# Patient Record
Sex: FEMALE | Race: WHITE | NOT HISPANIC OR LATINO | Employment: FULL TIME | URBAN - METROPOLITAN AREA
[De-identification: names, ages, dates, MRNs, and addresses within clinical notes are randomized per-mention and may not be internally consistent; named-entity substitution may affect disease eponyms.]

---

## 2017-01-17 ENCOUNTER — OFFICE VISIT (OUTPATIENT)
Dept: FAMILY MEDICINE CLINIC | Facility: CLINIC | Age: 52
End: 2017-01-17

## 2017-01-17 VITALS
SYSTOLIC BLOOD PRESSURE: 110 MMHG | DIASTOLIC BLOOD PRESSURE: 80 MMHG | HEART RATE: 103 BPM | WEIGHT: 140.5 LBS | TEMPERATURE: 97.8 F | OXYGEN SATURATION: 96 % | HEIGHT: 59 IN | BODY MASS INDEX: 28.32 KG/M2

## 2017-01-17 DIAGNOSIS — F41.9 ANXIETY: ICD-10-CM

## 2017-01-17 DIAGNOSIS — N95.1 PERIMENOPAUSAL SYMPTOMS: ICD-10-CM

## 2017-01-17 PROCEDURE — 99214 OFFICE O/P EST MOD 30 MIN: CPT | Performed by: NURSE PRACTITIONER

## 2017-01-17 RX ORDER — OXYBUTYNIN CHLORIDE 10 MG/1
10 TABLET, EXTENDED RELEASE ORAL DAILY
Qty: 30 TABLET | Refills: 0 | Status: SHIPPED | OUTPATIENT
Start: 2017-01-17 | End: 2019-02-25

## 2017-01-17 RX ORDER — TRAMADOL HYDROCHLORIDE 50 MG/1
50 TABLET ORAL EVERY 8 HOURS PRN
Qty: 90 TABLET | Refills: 0 | Status: SHIPPED | OUTPATIENT
Start: 2017-01-17 | End: 2018-03-08

## 2017-01-17 RX ORDER — AMITRIPTYLINE HYDROCHLORIDE 50 MG/1
50 TABLET, FILM COATED ORAL NIGHTLY
Qty: 90 TABLET | Refills: 1 | Status: SHIPPED | OUTPATIENT
Start: 2017-01-17 | End: 2017-05-16 | Stop reason: SINTOL

## 2017-01-17 NOTE — MR AVS SNAPSHOT
Mason Parada   1/17/2017 9:45 AM   Office Visit    Provider:  LANE Ho   Department:  [facility] FAM[facility]IC[facility]   Dept Phone:  [phone]                Your Full Care Plan              Today's Medication Changes          These changes are accurate as of: 1/17/17 10:40 AM.  If you have any questions, ask your nurse or doctor.               New Medication(s)Ordered:     oxybutynin XL 10 MG 24 hr tablet   Commonly known as:  DITROPAN-XL   Take 1 tablet by mouth Daily.   Started by:  LANE Ho       traMADol 50 MG tablet   Commonly known as:  ULTRAM   Take 1 tablet by mouth Every 8 (Eight) Hours As Needed for moderate pain (4-6).   Started by:  LANE Ho         Medication(s)that have changed:     amitriptyline 50 MG tablet   Commonly known as:  ELAVIL   Take 1 tablet by mouth Every Night.   What changed:    - medication strength  - how much to take   Changed by:  LANE Ho         Stop taking medication(s)listed here:     oxyCODONE-acetaminophen 5-325 MG per tablet   Commonly known as:  PERCOCET   Stopped by:  LANE Ho           tamsulosin 0.4 MG capsule 24 hr capsule   Commonly known as:  FLOMAX   Stopped by:  LANE Ho                Where to Get Your Medications      These medications were sent to [address] [address] [facility] AT [facility] & [place] - [phone] [facility] [phone] [address] [address]     Phone:  [phone]     amitriptyline 50 MG tablet    oxybutynin XL 10 MG 24 hr tablet         You can get these medications from any pharmacy     Bring a paper prescription for each of these medications     traMADol 50 MG tablet                  Your Updated Medication List          This list is accurate as of: 1/17/17 10:40 AM.  Always use your most recent med list.                amitriptyline 50 MG tablet   Commonly known as:  ELAVIL   Take 1 tablet by mouth Every Night.       fexofenadine 180 MG tablet   Commonly known as:  ALLEGRA       lisinopril 40 MG tablet   Commonly known as:  PRINIVIL,ZESTRIL   Take 1 tablet by mouth daily.       lisinopril-hydrochlorothiazide 20-25 MG per tablet   Commonly known as:  PRINZIDE,ZESTORETIC   Take 1 tablet by mouth Daily.       MOTRIN PO       ondansetron 4 MG tablet   Commonly known as:  ZOFRAN   Take 1 tablet by mouth every 8 (eight) hours as needed for nausea or vomiting.       oxybutynin XL 10 MG 24 hr tablet   Commonly known as:  DITROPAN-XL   Take 1 tablet by mouth Daily.       traMADol 50 MG tablet   Commonly known as:  ULTRAM   Take 1 tablet by mouth Every 8 (Eight) Hours As Needed for moderate pain (4-6).               You Were Diagnosed With        Codes Comments    Anxiety     ICD-10-CM: F41.9  ICD-9-CM: 300.00     Perimenopausal symptoms     ICD-10-CM: N95.1  ICD-9-CM: 627.2       Instructions     None    Patient Instructions History      MyChart Signup     [facility] [employer] allows you to send messages to your doctor, view your test results, renew your prescriptions, schedule appointments, and more. To sign up, go to [employer] and click on the Sign Up Now link in the New User? box. Enter your [employer] Activation Code exactly as it appears below along with the last four digits of your Social Security Number and your Date of Birth () to complete the sign-up process. If you do not sign up before the expiration date, you must request a new code.    [employer] Activation Code: [email or web address]  Expires: 2017 10:40 AM    If you have questions, you can email [email or web address] or call [phone] to talk to our [employer] staff. Remember, [employer] is NOT to be used for urgent needs. For medical emergencies, dial 911.               Other Info from Your Visit           Allergies     Sulfa Antibiotics  Itching     "  Reason for Visit     Flank Pain sees urology( had a stent removed yesterday)    Depression f/u      Vital Signs     Blood Pressure Pulse Temperature Height Weight Oxygen Saturation    110/80 103 97.8 °F (36.6 °C) 59\" (149.9 cm) 140 lb 8 oz (63.7 kg) 96%    Body Mass Index Smoking Status                28.38 kg/m2 Never Smoker          Problems and Diagnoses Noted     Anxiety problem        Perimenopausal symptoms          "

## 2017-01-17 NOTE — PROGRESS NOTES
Subjective   Mason Parada is a 51 y.o. female.     History of Present Illness   Mason Parada female 51 y.o. who presents today for follow up of Anxiety.  She reports anxiety and sleep disturbance.   She denies current suicidal and homicidal ideation. Risk factors are lifestyle of multiple roles, job stress and prior diagnosis of anxiety.  Previous treatment includes current Rx.  She complains of the following medication side effects: none.  Patient reports the medication is helping some but would like to try increasing the dose. Patient had stint removed yesterday at urology office which was painful.  She has contacted that office to request medication but is in significant discomfort which is making her more anxious.      The following portions of the patient's history were reviewed and updated as appropriate: allergies, current medications, past family history, past medical history, past social history, past surgical history and problem list.    Review of Systems   Psychiatric/Behavioral: Positive for agitation and sleep disturbance. The patient is nervous/anxious.        Objective   Physical Exam   Constitutional: She is oriented to person, place, and time. She appears well-developed and well-nourished.   HENT:   Head: Normocephalic and atraumatic.   Pulmonary/Chest: Effort normal.   Neurological: She is alert and oriented to person, place, and time.   Skin: Skin is warm and dry.   Psychiatric: Her mood appears anxious. Her speech is rapid and/or pressured. She is agitated.   Vitals reviewed.      Assessment/Plan   Mason was seen today for flank pain and depression.    Diagnoses and all orders for this visit:    Anxiety  -     amitriptyline (ELAVIL) 50 MG tablet; Take 1 tablet by mouth Every Night.    Perimenopausal symptoms  -     amitriptyline (ELAVIL) 50 MG tablet; Take 1 tablet by mouth Every Night.    Other orders  -     oxybutynin XL (DITROPAN-XL) 10 MG 24 hr tablet; Take 1 tablet by mouth  Daily.        Patient given tramadol RX signed per Dr. Rousseau. IRMA reviewed and controlled substance documents signed.

## 2017-02-11 DIAGNOSIS — F41.9 ANXIETY: ICD-10-CM

## 2017-02-11 DIAGNOSIS — I10 ESSENTIAL HYPERTENSION: ICD-10-CM

## 2017-02-11 DIAGNOSIS — N95.1 PERIMENOPAUSAL SYMPTOMS: ICD-10-CM

## 2017-02-13 RX ORDER — AMITRIPTYLINE HYDROCHLORIDE 25 MG/1
TABLET, FILM COATED ORAL
Qty: 30 TABLET | Refills: 0 | OUTPATIENT
Start: 2017-02-13

## 2017-02-13 RX ORDER — LISINOPRIL AND HYDROCHLOROTHIAZIDE 25; 20 MG/1; MG/1
TABLET ORAL
Qty: 30 TABLET | Refills: 0 | Status: SHIPPED | OUTPATIENT
Start: 2017-02-13 | End: 2017-03-16 | Stop reason: SDUPTHER

## 2017-02-18 DIAGNOSIS — I10 ESSENTIAL HYPERTENSION: ICD-10-CM

## 2017-02-20 RX ORDER — LISINOPRIL AND HYDROCHLOROTHIAZIDE 25; 20 MG/1; MG/1
TABLET ORAL
Qty: 30 TABLET | Refills: 0 | OUTPATIENT
Start: 2017-02-20

## 2017-03-16 ENCOUNTER — OFFICE VISIT (OUTPATIENT)
Dept: FAMILY MEDICINE CLINIC | Facility: CLINIC | Age: 52
End: 2017-03-16

## 2017-03-16 VITALS
HEART RATE: 95 BPM | TEMPERATURE: 98.6 F | DIASTOLIC BLOOD PRESSURE: 84 MMHG | HEIGHT: 59 IN | SYSTOLIC BLOOD PRESSURE: 126 MMHG | WEIGHT: 148 LBS | RESPIRATION RATE: 16 BRPM | BODY MASS INDEX: 29.84 KG/M2 | OXYGEN SATURATION: 96 %

## 2017-03-16 DIAGNOSIS — I10 ESSENTIAL HYPERTENSION: ICD-10-CM

## 2017-03-16 PROCEDURE — 99213 OFFICE O/P EST LOW 20 MIN: CPT | Performed by: NURSE PRACTITIONER

## 2017-03-16 RX ORDER — LISINOPRIL AND HYDROCHLOROTHIAZIDE 25; 20 MG/1; MG/1
TABLET ORAL
Qty: 30 TABLET | Refills: 0 | OUTPATIENT
Start: 2017-03-16

## 2017-03-16 RX ORDER — LISINOPRIL AND HYDROCHLOROTHIAZIDE 25; 20 MG/1; MG/1
1 TABLET ORAL DAILY
Qty: 90 TABLET | Refills: 1 | Status: SHIPPED | OUTPATIENT
Start: 2017-03-16 | End: 2017-10-31 | Stop reason: SDUPTHER

## 2017-03-16 NOTE — PROGRESS NOTES
Subjective   Mason Parada is a 51 y.o. female.     History of Present Illness   Mason Parada 51 y.o. female who presents today for routine follow up check and medication refills.  she has a history of   Patient Active Problem List   Diagnosis   • Essential hypertension   • Gastroesophageal reflux disease without esophagitis   • Seasonal allergies   .  Since the last visit, she has overall felt well.  she has been compliant with current meds.  she denies medication side effects.   Reports Elavil has helped her anxiety and she would like to continue on current dose.  She denies side effects.  She needs refill on her lisinopril/hctz.  BP today is 126/84.    The following portions of the patient's history were reviewed and updated as appropriate: allergies, current medications, past family history, past medical history, past social history, past surgical history and problem list.    Review of Systems   Constitutional: Negative for unexpected weight change.   Respiratory: Negative for shortness of breath.    Cardiovascular: Negative for chest pain and palpitations.   Psychiatric/Behavioral: Negative for behavioral problems.       Objective   Physical Exam   Constitutional: She is oriented to person, place, and time. She appears well-developed and well-nourished.   Cardiovascular: Normal rate and regular rhythm.    Pulmonary/Chest: Effort normal and breath sounds normal.   Neurological: She is alert and oriented to person, place, and time.   Psychiatric: She has a normal mood and affect. Judgment normal.   Vitals reviewed.      Assessment/Plan   Mason was seen today for depression and hypertension.    Diagnoses and all orders for this visit:    Essential hypertension  -     lisinopril-hydrochlorothiazide (PRINZIDE,ZESTORETIC) 20-25 MG per tablet; Take 1 tablet by mouth Daily.

## 2017-05-16 ENCOUNTER — OFFICE VISIT (OUTPATIENT)
Dept: FAMILY MEDICINE CLINIC | Facility: CLINIC | Age: 52
End: 2017-05-16

## 2017-05-16 VITALS
BODY MASS INDEX: 29.23 KG/M2 | OXYGEN SATURATION: 99 % | TEMPERATURE: 98.5 F | HEIGHT: 59 IN | SYSTOLIC BLOOD PRESSURE: 120 MMHG | HEART RATE: 77 BPM | WEIGHT: 145 LBS | DIASTOLIC BLOOD PRESSURE: 76 MMHG | RESPIRATION RATE: 16 BRPM

## 2017-05-16 DIAGNOSIS — F41.9 ANXIETY: Primary | ICD-10-CM

## 2017-05-16 PROCEDURE — 99214 OFFICE O/P EST MOD 30 MIN: CPT | Performed by: NURSE PRACTITIONER

## 2017-05-16 RX ORDER — DULOXETIN HYDROCHLORIDE 30 MG/1
30 CAPSULE, DELAYED RELEASE ORAL DAILY
Qty: 30 CAPSULE | Refills: 1 | Status: SHIPPED | OUTPATIENT
Start: 2017-05-16 | End: 2017-09-14

## 2017-06-16 ENCOUNTER — OFFICE VISIT (OUTPATIENT)
Dept: FAMILY MEDICINE CLINIC | Facility: CLINIC | Age: 52
End: 2017-06-16

## 2017-06-16 VITALS
RESPIRATION RATE: 18 BRPM | OXYGEN SATURATION: 97 % | SYSTOLIC BLOOD PRESSURE: 122 MMHG | BODY MASS INDEX: 28.83 KG/M2 | HEART RATE: 75 BPM | TEMPERATURE: 98.1 F | DIASTOLIC BLOOD PRESSURE: 72 MMHG | WEIGHT: 143 LBS | HEIGHT: 59 IN

## 2017-06-16 DIAGNOSIS — F41.9 ANXIETY: Primary | ICD-10-CM

## 2017-06-16 PROCEDURE — 99213 OFFICE O/P EST LOW 20 MIN: CPT | Performed by: NURSE PRACTITIONER

## 2017-06-16 NOTE — PROGRESS NOTES
Subjective   Mason Parada is a 51 y.o. female.     History of Present Illness   Mason Parada female 51 y.o. who presents today for follow up of Anxiety.  She reports medication does not seem to be helping.   She denies current suicidal and homicidal ideation. She would like to have [employer] testing done.   The following portions of the patient's history were reviewed and updated as appropriate: allergies, current medications, past family history, past medical history, past social history, past surgical history and problem list.    Review of Systems   Constitutional: Negative for unexpected weight change.   Respiratory: Negative for shortness of breath.    Cardiovascular: Negative for chest pain and palpitations.   Psychiatric/Behavioral: Positive for sleep disturbance. Negative for behavioral problems. The patient is nervous/anxious.        Objective   Physical Exam   Constitutional: She is oriented to person, place, and time. She appears well-developed and well-nourished.   Cardiovascular: Normal rate and regular rhythm.    Pulmonary/Chest: Effort normal and breath sounds normal.   Neurological: She is alert and oriented to person, place, and time.   Psychiatric: She has a normal mood and affect. Judgment normal.   Nursing note and vitals reviewed.      Assessment/Plan   Mason was seen today for medication problem.    Diagnoses and all orders for this visit:    Anxiety           [employer] testing ordered.  No medication changes until results received and reviewed with patient.

## 2017-06-23 RX ORDER — DESVENLAFAXINE SUCCINATE 50 MG/1
50 TABLET, EXTENDED RELEASE ORAL DAILY
Qty: 90 TABLET | Refills: 0 | Status: SHIPPED | OUTPATIENT
Start: 2017-06-23 | End: 2017-09-14 | Stop reason: SINTOL

## 2017-09-14 ENCOUNTER — OFFICE VISIT (OUTPATIENT)
Dept: FAMILY MEDICINE CLINIC | Facility: CLINIC | Age: 52
End: 2017-09-14

## 2017-09-14 VITALS
TEMPERATURE: 97.6 F | HEIGHT: 59 IN | SYSTOLIC BLOOD PRESSURE: 137 MMHG | WEIGHT: 148 LBS | BODY MASS INDEX: 29.84 KG/M2 | HEART RATE: 87 BPM | DIASTOLIC BLOOD PRESSURE: 79 MMHG | OXYGEN SATURATION: 98 %

## 2017-09-14 DIAGNOSIS — F41.9 ANXIETY: Primary | ICD-10-CM

## 2017-09-14 PROCEDURE — 99213 OFFICE O/P EST LOW 20 MIN: CPT | Performed by: NURSE PRACTITIONER

## 2017-09-14 RX ORDER — BUSPIRONE HYDROCHLORIDE 5 MG/1
5 TABLET ORAL 3 TIMES DAILY
Qty: 60 TABLET | Refills: 0 | Status: SHIPPED | OUTPATIENT
Start: 2017-09-14 | End: 2017-10-03 | Stop reason: SDUPTHER

## 2017-09-14 RX ORDER — BUPROPION HYDROCHLORIDE 150 MG/1
150 TABLET ORAL DAILY
Qty: 30 TABLET | Refills: 1 | Status: CANCELLED | OUTPATIENT
Start: 2017-09-14

## 2017-09-14 RX ORDER — VILAZODONE HYDROCHLORIDE 20 MG/1
20 TABLET ORAL DAILY
Qty: 30 TABLET | Refills: 0 | Status: SHIPPED | OUTPATIENT
Start: 2017-09-14 | End: 2017-11-28 | Stop reason: SINTOL

## 2017-09-14 NOTE — PROGRESS NOTES
Subjective   Mason Parada is a 52 y.o. female.     History of Present Illness   Mason Parada female 52 y.o. who presents today for follow up of Anxiety and Panic Attacks.  She reports anxiety, excessive worry, unable to relax, panic feeling and weight gain.   She denies current suicidal and homicidal ideation. Risk factors are prior diagnosis of anxiety.  Previous treatment includes several medications.  She complains of the following medication side effects: weight gain.     Based on [employer] testing, only recommended option would be pristiq.  Patient tried but did not notice improvement.  She has also tried trintellix, elavil, cymbalta with no improvement.     The following portions of the patient's history were reviewed and updated as appropriate: allergies, current medications, past family history, past medical history, past social history, past surgical history and problem list.    Review of Systems   Constitutional: Negative for unexpected weight change.   Respiratory: Negative for shortness of breath.    Cardiovascular: Negative for chest pain and palpitations.   Psychiatric/Behavioral: Positive for decreased concentration. Negative for behavioral problems, dysphoric mood, self-injury and suicidal ideas. The patient is nervous/anxious and is hyperactive.        Objective   Physical Exam   Constitutional: She is oriented to person, place, and time. She appears well-developed and well-nourished.   Cardiovascular: Regular rhythm.    Pulmonary/Chest: Effort normal.   Neurological: She is alert and oriented to person, place, and time.   Psychiatric: Her speech is normal and behavior is normal. Judgment and thought content normal. Her mood appears anxious. Cognition and memory are normal.   Nursing note and vitals reviewed.      Assessment/Plan   Mason was seen today for anxiety.    Diagnoses and all orders for this visit:    Anxiety  -     busPIRone (BUSPAR) 5 MG tablet; Take 1 tablet by mouth 3 (Three) Times a  Day.  -     vilazodone (VIIBRYD) 20 MG tablet tablet; Take 1 tablet by mouth Daily.    Other orders  -     Cancel: buPROPion XL (WELLBUTRIN XL) 150 MG 24 hr tablet; Take 1 tablet by mouth Daily.

## 2017-09-30 DIAGNOSIS — F41.9 ANXIETY: ICD-10-CM

## 2017-10-02 RX ORDER — BUSPIRONE HYDROCHLORIDE 5 MG/1
TABLET ORAL
Qty: 60 TABLET | Refills: 0 | OUTPATIENT
Start: 2017-10-02

## 2017-10-03 RX ORDER — BUSPIRONE HYDROCHLORIDE 5 MG/1
5 TABLET ORAL 3 TIMES DAILY
Qty: 60 TABLET | Refills: 2 | Status: SHIPPED | OUTPATIENT
Start: 2017-10-03 | End: 2017-11-30 | Stop reason: SDUPTHER

## 2017-10-31 DIAGNOSIS — I10 ESSENTIAL HYPERTENSION: ICD-10-CM

## 2017-10-31 RX ORDER — LISINOPRIL AND HYDROCHLOROTHIAZIDE 25; 20 MG/1; MG/1
TABLET ORAL
Qty: 30 TABLET | Refills: 0 | Status: SHIPPED | OUTPATIENT
Start: 2017-10-31 | End: 2017-12-13 | Stop reason: SDUPTHER

## 2017-11-10 DIAGNOSIS — I10 ESSENTIAL HYPERTENSION: ICD-10-CM

## 2017-11-10 RX ORDER — LISINOPRIL AND HYDROCHLOROTHIAZIDE 25; 20 MG/1; MG/1
TABLET ORAL
Qty: 30 TABLET | Refills: 0 | Status: SHIPPED | OUTPATIENT
Start: 2017-11-10 | End: 2017-12-13

## 2017-11-28 ENCOUNTER — OFFICE VISIT (OUTPATIENT)
Dept: FAMILY MEDICINE CLINIC | Facility: CLINIC | Age: 52
End: 2017-11-28

## 2017-11-28 VITALS
HEART RATE: 81 BPM | SYSTOLIC BLOOD PRESSURE: 114 MMHG | TEMPERATURE: 98.3 F | RESPIRATION RATE: 16 BRPM | DIASTOLIC BLOOD PRESSURE: 74 MMHG | HEIGHT: 59 IN | WEIGHT: 152 LBS | BODY MASS INDEX: 30.64 KG/M2

## 2017-11-28 DIAGNOSIS — J01.00 ACUTE MAXILLARY SINUSITIS, RECURRENCE NOT SPECIFIED: Primary | ICD-10-CM

## 2017-11-28 DIAGNOSIS — F41.9 ANXIETY: ICD-10-CM

## 2017-11-28 PROCEDURE — 99214 OFFICE O/P EST MOD 30 MIN: CPT | Performed by: NURSE PRACTITIONER

## 2017-11-28 RX ORDER — BENZONATATE 200 MG/1
200 CAPSULE ORAL 3 TIMES DAILY PRN
Qty: 30 CAPSULE | Refills: 0 | Status: SHIPPED | OUTPATIENT
Start: 2017-11-28 | End: 2018-03-08

## 2017-11-28 RX ORDER — AMOXICILLIN AND CLAVULANATE POTASSIUM 875; 125 MG/1; MG/1
1 TABLET, FILM COATED ORAL 2 TIMES DAILY
Qty: 20 TABLET | Refills: 0 | Status: SHIPPED | OUTPATIENT
Start: 2017-11-28 | End: 2017-12-08

## 2017-11-28 NOTE — PROGRESS NOTES
Subjective   Mason Parada is a 52 y.o. female.     History of Present Illness   Mason Parada 52 y.o. female who presents for evaluation of sinus pressure and congestion. Symptoms include ear pressure, congestion, nasal blockage, dry cough, productive cough and lower, upper sinus pain.  Onset of symptoms was 4 weeks ago, unchanged since that time. Patient denies shortness of breath, wheezing.   Evaluation to date: none Treatment to date:  OTC oral decongestants and OTC cough suppressant.       Patient stopped viibryd due to diarrhea.  She has been taking buspar TID which seems to help.   The following portions of the patient's history were reviewed and updated as appropriate: allergies, current medications, past family history, past medical history, past social history, past surgical history and problem list.    Review of Systems   Constitutional: Negative for chills and fever.   HENT: Positive for congestion, postnasal drip, rhinorrhea and sinus pressure.    Respiratory: Positive for cough and chest tightness.        Objective   Physical Exam   Constitutional: She is oriented to person, place, and time. She appears well-developed and well-nourished.   HENT:   Right Ear: Tympanic membrane, external ear and ear canal normal.   Left Ear: Tympanic membrane, external ear and ear canal normal.   Nose: Right sinus exhibits maxillary sinus tenderness. Right sinus exhibits no frontal sinus tenderness. Left sinus exhibits maxillary sinus tenderness. Left sinus exhibits no frontal sinus tenderness.   Mouth/Throat: Uvula is midline and oropharynx is clear and moist.   Cardiovascular: Normal rate and regular rhythm.    Pulmonary/Chest: Effort normal and breath sounds normal.   Neurological: She is alert and oriented to person, place, and time.   Skin: Skin is warm.   Psychiatric: She has a normal mood and affect. Judgment normal.   Nursing note and vitals reviewed.      Assessment/Plan   Mason was seen today for  uri.    Diagnoses and all orders for this visit:    Acute maxillary sinusitis, recurrence not specified  -     amoxicillin-clavulanate (AUGMENTIN) 875-125 MG per tablet; Take 1 tablet by mouth 2 (Two) Times a Day for 10 days.  -     benzonatate (TESSALON) 200 MG capsule; Take 1 capsule by mouth 3 (Three) Times a Day As Needed for Cough.    Anxiety

## 2017-11-30 DIAGNOSIS — F41.9 ANXIETY: ICD-10-CM

## 2017-11-30 RX ORDER — BUSPIRONE HYDROCHLORIDE 5 MG/1
TABLET ORAL
Qty: 60 TABLET | Refills: 1 | OUTPATIENT
Start: 2017-11-30

## 2017-11-30 RX ORDER — BUSPIRONE HYDROCHLORIDE 5 MG/1
5 TABLET ORAL 3 TIMES DAILY
Qty: 60 TABLET | Refills: 2 | Status: SHIPPED | OUTPATIENT
Start: 2017-11-30 | End: 2017-12-13 | Stop reason: SDUPTHER

## 2017-12-13 ENCOUNTER — OFFICE VISIT (OUTPATIENT)
Dept: FAMILY MEDICINE CLINIC | Facility: CLINIC | Age: 52
End: 2017-12-13

## 2017-12-13 VITALS
DIASTOLIC BLOOD PRESSURE: 92 MMHG | RESPIRATION RATE: 18 BRPM | SYSTOLIC BLOOD PRESSURE: 138 MMHG | HEIGHT: 59 IN | TEMPERATURE: 97.9 F | BODY MASS INDEX: 31.25 KG/M2 | OXYGEN SATURATION: 98 % | WEIGHT: 155 LBS | HEART RATE: 82 BPM

## 2017-12-13 DIAGNOSIS — G56.01 CARPAL TUNNEL SYNDROME OF RIGHT WRIST: ICD-10-CM

## 2017-12-13 DIAGNOSIS — I10 ESSENTIAL HYPERTENSION: Primary | ICD-10-CM

## 2017-12-13 DIAGNOSIS — F41.9 ANXIETY: ICD-10-CM

## 2017-12-13 PROCEDURE — 99214 OFFICE O/P EST MOD 30 MIN: CPT | Performed by: NURSE PRACTITIONER

## 2017-12-13 RX ORDER — BUSPIRONE HYDROCHLORIDE 5 MG/1
5 TABLET ORAL 3 TIMES DAILY
Qty: 60 TABLET | Refills: 11 | Status: SHIPPED | OUTPATIENT
Start: 2017-12-13 | End: 2018-11-30

## 2017-12-13 RX ORDER — LISINOPRIL AND HYDROCHLOROTHIAZIDE 25; 20 MG/1; MG/1
1 TABLET ORAL DAILY
Qty: 30 TABLET | Refills: 11 | Status: SHIPPED | OUTPATIENT
Start: 2017-12-13 | End: 2018-01-18 | Stop reason: SDUPTHER

## 2017-12-13 RX ORDER — DICLOFENAC 35 MG/1
35 CAPSULE ORAL 3 TIMES DAILY PRN
Qty: 90 CAPSULE | Refills: 1 | Status: SHIPPED | OUTPATIENT
Start: 2017-12-13 | End: 2018-02-13 | Stop reason: SDUPTHER

## 2017-12-13 NOTE — PROGRESS NOTES
Subjective   Mason Parada is a 52 y.o. female.     History of Present Illness   Mason Parada 52 y.o. female who presents today for routine follow up check and medication refills.  she has a history of   Patient Active Problem List   Diagnosis   • Essential hypertension   • Gastroesophageal reflux disease without esophagitis   • Seasonal allergies   .  Since the last visit, she has overall felt well.  She has been taking medications as prescribed.  she has been compliant with current medications have reviewed them.  The patient denies medication side effects.    Results for orders placed or performed during the hospital encounter of 05/24/16   Once Urine culture   Result Value Ref Range    Urine Culture >100,000 CFU/mL Mixed Culture    Once Comprehensive metabolic panel   Result Value Ref Range    Glucose 89 65 - 99 mg/dL    BUN 19 6 - 20 mg/dL    Creatinine 0.68 0.57 - 1.00 mg/dL    Sodium 134 (L) 136 - 145 mmol/L    Potassium 4.0 3.5 - 5.2 mmol/L    Chloride 98 98 - 107 mmol/L    CO2 24.8 22.0 - 29.0 mmol/L    Calcium 9.3 8.6 - 10.5 mg/dL    Total Protein 6.5 6.0 - 8.5 g/dL    Albumin 3.90 3.50 - 5.20 g/dL    ALT (SGPT) 27 1 - 33 U/L    AST (SGOT) 23 1 - 32 U/L    Alkaline Phosphatase 74 39 - 117 U/L    Total Bilirubin 0.5 0.1 - 1.2 mg/dL    eGFR Non African Amer 92 >60 mL/min/1.73    Globulin 2.6 gm/dL    A/G Ratio 1.5 g/dL    BUN/Creatinine Ratio 27.9 (H) 7.0 - 25.0    Anion Gap 11.2 mmol/L   Once Lipase   Result Value Ref Range    Lipase 29 13 - 60 U/L   Once Urinalysis w/Culture if Indicated   Result Value Ref Range    Color, UA Yellow Yellow, Straw    Appearance, UA Clear Clear    pH, UA 6.0 5.0 - 8.0    Specific Gravity, UA 1.023 1.005 - 1.030    Glucose, UA Negative Negative    Ketones, UA Negative Negative    Bilirubin, UA Negative Negative    Blood, UA Small (1+) (A) Negative    Protein, UA Negative Negative    Leuk Esterase, UA Small (1+) (A) Negative    Nitrite, UA Negative Negative    Urobilinogen, UA  0.2 E.U./dL 0.2 E.U./dL, 1.0 E.U./dL   Once hCG, serum, qualitative   Result Value Ref Range    HCG Qualitative Negative Indeterminate, Negative   Once CBC auto differential   Result Value Ref Range    WBC 13.50 (H) 4.50 - 10.70 10*3/mm3    RBC 4.45 3.90 - 5.20 10*6/mm3    Hemoglobin 13.1 11.9 - 15.5 g/dL    Hematocrit 39.0 35.6 - 45.5 %    MCV 87.6 80.5 - 98.2 fL    MCH 29.4 26.9 - 32.7 pg    MCHC 33.6 32.4 - 36.3 g/dL    RDW 12.0 11.7 - 13.0 %    RDW-SD 38.7 37.0 - 54.0 fl    MPV 9.5 6.0 - 12.0 fL    Platelets 321 140 - 500 10*3/mm3    Neutrophil % 85.4 (H) 42.7 - 76.0 %    Lymphocyte % 9.7 (L) 19.6 - 45.3 %    Monocyte % 4.0 (L) 5.0 - 12.0 %    Eosinophil % 0.3 0.3 - 6.2 %    Basophil % 0.2 0.0 - 1.5 %    Immature Grans % 0.4 0.0 - 0.5 %    Neutrophils, Absolute 11.53 (H) 1.90 - 8.10 10*3/mm3    Lymphocytes, Absolute 1.31 0.90 - 4.80 10*3/mm3    Monocytes, Absolute 0.54 0.20 - 1.20 10*3/mm3    Eosinophils, Absolute 0.04 0.00 - 0.70 10*3/mm3    Basophils, Absolute 0.03 0.00 - 0.20 10*3/mm3    Immature Grans, Absolute 0.05 (H) 0.00 - 0.03 10*3/mm3   Once Urinalysis, Microscopic only   Result Value Ref Range    RBC, UA 6-12 (A) None Seen /HPF    WBC, UA 6-12 (A) None Seen /HPF    Bacteria, UA None Seen None Seen /HPF    Squamous Epithelial Cells, UA 3-6 (A) None Seen, 0-2 /HPF    Hyaline Casts, UA 3-6 None Seen /LPF    Methodology Automated Microscopy    Once Light Blue Top   Result Value Ref Range    Extra Tube Hold for add-ons.      Patient has been seeing Dr. Lora for cortisone injections to help her carpal tunnel. She states it has improved symptoms some but she still has a lot of discomfort. She has not been wearing her brace at night. Patient has been prescribed mobic which did not help.   The following portions of the patient's history were reviewed and updated as appropriate: allergies, current medications, past family history, past medical history, past social history, past surgical history and problem  list.    Review of Systems   Constitutional: Negative for unexpected weight change.   Respiratory: Negative for shortness of breath.    Cardiovascular: Negative for chest pain and palpitations.   Psychiatric/Behavioral: Negative for behavioral problems.       Objective   Physical Exam   Constitutional: She is oriented to person, place, and time. She appears well-developed and well-nourished.   Cardiovascular: Normal rate and regular rhythm.    Pulmonary/Chest: Effort normal and breath sounds normal.   Neurological: She is alert and oriented to person, place, and time.   Psychiatric: She has a normal mood and affect. Her behavior is normal. Judgment and thought content normal.   Nursing note and vitals reviewed.      Assessment/Plan   Mason was seen today for hypertension and anxiety.    Diagnoses and all orders for this visit:    Essential hypertension  -     lisinopril-hydrochlorothiazide (PRINZIDE,ZESTORETIC) 20-25 MG per tablet; Take 1 tablet by mouth Daily.    Anxiety  -     busPIRone (BUSPAR) 5 MG tablet; Take 1 tablet by mouth 3 (Three) Times a Day.    Carpal tunnel syndrome of right wrist  -     Diclofenac 35 MG capsule; Take 35 mg by mouth 3 (Three) Times a Day As Needed (pain).

## 2017-12-28 ENCOUNTER — OFFICE VISIT (OUTPATIENT)
Dept: FAMILY MEDICINE CLINIC | Facility: CLINIC | Age: 52
End: 2017-12-28

## 2017-12-28 VITALS
WEIGHT: 156 LBS | HEIGHT: 59 IN | DIASTOLIC BLOOD PRESSURE: 72 MMHG | SYSTOLIC BLOOD PRESSURE: 118 MMHG | BODY MASS INDEX: 31.45 KG/M2 | RESPIRATION RATE: 18 BRPM | OXYGEN SATURATION: 98 % | TEMPERATURE: 98.7 F | HEART RATE: 83 BPM

## 2017-12-28 DIAGNOSIS — R07.9 CHEST PAIN, UNSPECIFIED TYPE: Primary | ICD-10-CM

## 2017-12-28 DIAGNOSIS — R07.9 CHEST PAIN RADIATING TO JAW: ICD-10-CM

## 2017-12-28 DIAGNOSIS — R00.2 HEART PALPITATIONS: ICD-10-CM

## 2017-12-28 PROCEDURE — 99213 OFFICE O/P EST LOW 20 MIN: CPT | Performed by: NURSE PRACTITIONER

## 2017-12-28 PROCEDURE — 93000 ELECTROCARDIOGRAM COMPLETE: CPT | Performed by: NURSE PRACTITIONER

## 2017-12-28 RX ORDER — PROPRANOLOL HYDROCHLORIDE 10 MG/1
10 TABLET ORAL DAILY
Qty: 30 TABLET | Refills: 0 | Status: SHIPPED | OUTPATIENT
Start: 2017-12-28 | End: 2018-01-24 | Stop reason: SDUPTHER

## 2017-12-28 NOTE — PROGRESS NOTES
Subjective   Mason Parada is a 52 y.o. female.     History of Present Illness   Mason Parada 52 y.o. female complains of chest pain. Onset was 7 days ago. Symptoms have improved since that time. The patient's pain occurred twice that day at rest. The patient describes the pain as flushing and aching sensation from left chest, down left arm and into jaw.  and radiates to the left arm and left neck/jaw. Patient rates pain as a moderate in intensity.  Associated symptoms are: palpitations and nausea.   Aggravating factors are: none. Alleviating factors are: none. She denies headache, fever, cough, dyspnea, hemoptysis, sputum production, GERD, abdominal pain, vomiting, back pain, leg swelling, hemoptysis, dysphagia and ROM chest wall pain.  Patient's cardiac risk factors are: dyslipidemia, hypertension and obesity (BMI >= 30 kg/m2). Patient's risk factors for DVT/PE: none. Previous cardiac testing: none.  Patient is overdue for labs.      The following portions of the patient's history were reviewed and updated as appropriate: allergies, current medications, past family history, past medical history, past social history, past surgical history and problem list.    Review of Systems   Constitutional: Negative for unexpected weight change.   Respiratory: Negative for shortness of breath.    Cardiovascular: Positive for leg swelling. Negative for chest pain and palpitations.   Psychiatric/Behavioral: Negative for behavioral problems.       Objective   Physical Exam   Constitutional: She is oriented to person, place, and time. She appears well-developed and well-nourished.   Cardiovascular: Normal rate and regular rhythm.    Pulmonary/Chest: Effort normal and breath sounds normal.   Neurological: She is alert and oriented to person, place, and time.   Psychiatric: She has a normal mood and affect. Judgment normal.   Nursing note and vitals reviewed.      Assessment/Plan   Mason was seen today for weird feeling, jaw pain  and pain left arm.    Diagnoses and all orders for this visit:    Chest pain, unspecified type  -     Comprehensive metabolic panel  -     Lipid panel  -     CBC and Differential  -     TSH  -     Ambulatory Referral to Cardiology    Chest pain radiating to jaw  -     Comprehensive metabolic panel  -     Lipid panel  -     CBC and Differential  -     TSH  -     Ambulatory Referral to Cardiology    Heart palpitations  -     Comprehensive metabolic panel  -     Lipid panel  -     CBC and Differential  -     TSH  -     propranolol (INDERAL) 10 MG tablet; Take 1 tablet by mouth Daily.  -     Ambulatory Referral to Cardiology      EKG in office normal. Patient not currently having symptoms. Referred to cardiology for workup. Started propranolol for palpitations.  Patient to go to ER if symptoms recur. Patient verbalized understanding.

## 2017-12-28 NOTE — PROGRESS NOTES
Procedure     ECG 12 Lead  Date/Time: 12/28/2017 5:50 PM  Performed by: WILLIAN JOSEPH  Authorized by: WILLIAN JOSEPH   Comparison: not compared with previous ECG   Rhythm: sinus rhythm  Rate: normal  Conduction: conduction normal  ST Segments: ST segments normal  T Waves: T waves normal  QRS axis: normal  Other: no other findings  Clinical impression: normal ECG  Comments: P//142 ms   ms  QT/QTc 384/457 ms  HR 85

## 2017-12-29 LAB
ALBUMIN SERPL-MCNC: 4.4 G/DL (ref 3.5–5.2)
ALBUMIN/GLOB SERPL: 1.6 G/DL
ALP SERPL-CCNC: 96 U/L (ref 39–117)
ALT SERPL-CCNC: 23 U/L (ref 1–33)
AST SERPL-CCNC: 20 U/L (ref 1–32)
BASOPHILS # BLD AUTO: 0.06 10*3/MM3 (ref 0–0.2)
BASOPHILS NFR BLD AUTO: 0.8 % (ref 0–1.5)
BILIRUB SERPL-MCNC: 0.2 MG/DL (ref 0.1–1.2)
BUN SERPL-MCNC: 22 MG/DL (ref 6–20)
BUN/CREAT SERPL: 29.7 (ref 7–25)
CALCIUM SERPL-MCNC: 9.7 MG/DL (ref 8.6–10.5)
CHLORIDE SERPL-SCNC: 104 MMOL/L (ref 98–107)
CHOLEST SERPL-MCNC: 248 MG/DL (ref 0–200)
CO2 SERPL-SCNC: 23.2 MMOL/L (ref 22–29)
CREAT SERPL-MCNC: 0.74 MG/DL (ref 0.57–1)
DIFFERENTIAL COMMENT: NORMAL
EOSINOPHIL # BLD AUTO: 0.39 10*3/MM3 (ref 0–0.7)
EOSINOPHIL NFR BLD AUTO: 5.2 % (ref 0.3–6.2)
ERYTHROCYTE [DISTWIDTH] IN BLOOD BY AUTOMATED COUNT: 12.4 % (ref 11.7–13)
GLOBULIN SER CALC-MCNC: 2.8 GM/DL
GLUCOSE SERPL-MCNC: 113 MG/DL (ref 65–99)
HCT VFR BLD AUTO: 39.9 % (ref 35.6–45.5)
HDLC SERPL-MCNC: 41 MG/DL (ref 40–60)
HGB BLD-MCNC: 13.3 G/DL (ref 11.9–15.5)
IMM GRANULOCYTES # BLD: 0.02 10*3/MM3 (ref 0–0.03)
IMM GRANULOCYTES NFR BLD: 0.3 % (ref 0–0.5)
LDLC SERPL CALC-MCNC: 148 MG/DL (ref 0–100)
LYMPHOCYTES # BLD AUTO: 1.8 10*3/MM3 (ref 0.9–4.8)
LYMPHOCYTES NFR BLD AUTO: 24 % (ref 19.6–45.3)
MCH RBC QN AUTO: 29.8 PG (ref 26.9–32)
MCHC RBC AUTO-ENTMCNC: 33.3 G/DL (ref 32.4–36.3)
MCV RBC AUTO: 89.5 FL (ref 80.5–98.2)
MONOCYTES # BLD AUTO: 0.54 10*3/MM3 (ref 0.2–1.2)
MONOCYTES NFR BLD AUTO: 7.2 % (ref 5–12)
NEUTROPHILS # BLD AUTO: 4.7 10*3/MM3 (ref 1.9–8.1)
NEUTROPHILS NFR BLD AUTO: 62.5 % (ref 42.7–76)
NRBC BLD AUTO-RTO: 0.8 /100 WBC (ref 0–0)
PLATELET # BLD AUTO: 325 10*3/MM3 (ref 140–500)
PLATELET BLD QL SMEAR: NORMAL
POTASSIUM SERPL-SCNC: 3.9 MMOL/L (ref 3.5–5.2)
PROT SERPL-MCNC: 7.2 G/DL (ref 6–8.5)
RBC # BLD AUTO: 4.46 10*6/MM3 (ref 3.9–5.2)
RBC MORPH BLD: NORMAL
SODIUM SERPL-SCNC: 141 MMOL/L (ref 136–145)
TRIGL SERPL-MCNC: 295 MG/DL (ref 0–150)
TSH SERPL DL<=0.005 MIU/L-ACNC: 2.55 MIU/ML (ref 0.27–4.2)
VLDLC SERPL CALC-MCNC: 59 MG/DL (ref 5–40)
WBC # BLD AUTO: 7.51 10*3/MM3 (ref 4.5–10.7)

## 2017-12-29 RX ORDER — ATORVASTATIN CALCIUM 10 MG/1
10 TABLET, FILM COATED ORAL DAILY
Qty: 30 TABLET | Refills: 5 | Status: SHIPPED | OUTPATIENT
Start: 2017-12-29 | End: 2018-11-30 | Stop reason: SDUPTHER

## 2018-01-08 ENCOUNTER — OFFICE VISIT (OUTPATIENT)
Dept: CARDIOLOGY | Facility: CLINIC | Age: 53
End: 2018-01-08

## 2018-01-08 VITALS
HEART RATE: 75 BPM | WEIGHT: 153 LBS | BODY MASS INDEX: 30.84 KG/M2 | SYSTOLIC BLOOD PRESSURE: 118 MMHG | DIASTOLIC BLOOD PRESSURE: 82 MMHG | HEIGHT: 59 IN

## 2018-01-08 DIAGNOSIS — R07.2 PRECORDIAL PAIN: ICD-10-CM

## 2018-01-08 DIAGNOSIS — R00.2 PALPITATIONS: ICD-10-CM

## 2018-01-08 DIAGNOSIS — R94.31 ABNORMAL ECG: Primary | ICD-10-CM

## 2018-01-08 DIAGNOSIS — R06.02 SOB (SHORTNESS OF BREATH): ICD-10-CM

## 2018-01-08 PROCEDURE — 99203 OFFICE O/P NEW LOW 30 MIN: CPT | Performed by: INTERNAL MEDICINE

## 2018-01-08 NOTE — PROGRESS NOTES
Subjective:     Encounter Date:2018      Patient ID: Mason Parada is a 52 y.o. female.    Chief Complaint:  History of Present Illness    Dear Ashlee,    I had the pleasure of seeing this patient in the office today; she comes in for evaluation and consultation regarding recent episodes of chest and left arm discomfort along with palpitations.    This patient has no known cardiac history.  This patient has no history of coronary artery disease, congestive heart failure, rheumatic fever, rheumatic heart disease, congenital heart disease or heart murmur.  This patient has never required invasive cardiovascular evaluation.    Lately, she has had some episodes where she gets sudden left arm and shoulder discomfort along with discomfort in the upper left chest and the left side of her jaw.  When this occurs she is also gotten diaphoretic and nauseated.  She is pretty active but doesn't do any regular exercise.  The episode will last several minutes and then go away.  She does not notice any associated shortness breath.  She has had increased palpitations lately, but attributes that to the fact that she is going through menopause.  She's not had any palpitations or heart racing.  No presyncope or syncope.  Orthopnea or PND.    The following portions of the patient's history were reviewed and updated as appropriate: allergies, current medications, past family history, past medical history, past social history, past surgical history and problem list.    Past Medical History:   Diagnosis Date   • Chest pain radiating to jaw    • GERD (gastroesophageal reflux disease)    • Heart palpitations    • Hypertension    • Kidney stone    • Kidney stones    • Left arm pain    • Leg swelling        Past Surgical History:   Procedure Laterality Date   •  SECTION     • KIDNEY STONE SURGERY         Social History     Social History   • Marital status:      Spouse name: N/A   • Number of children: N/A   • Years of  education: N/A     Occupational History   • Not on file.     Social History Main Topics   • Smoking status: Never Smoker   • Smokeless tobacco: Never Used   • Alcohol use Yes      Comment: occ   • Drug use: No   • Sexual activity: Defer     Other Topics Concern   • Not on file     Social History Narrative       Review of Systems   Constitution: Negative for chills, decreased appetite, fever and night sweats.   HENT: Negative for ear discharge, ear pain, hearing loss, nosebleeds and sore throat.    Eyes: Negative for blurred vision, double vision and pain.   Cardiovascular: Negative for cyanosis.   Respiratory: Negative for hemoptysis and sputum production.    Endocrine: Negative for cold intolerance and heat intolerance.   Hematologic/Lymphatic: Negative for adenopathy.   Skin: Negative for dry skin, itching, nail changes, rash and suspicious lesions.   Musculoskeletal: Negative for arthritis, gout, muscle cramps, muscle weakness, myalgias and neck pain.   Gastrointestinal: Negative for anorexia, bowel incontinence, constipation, diarrhea, dysphagia, hematemesis and jaundice.   Genitourinary: Negative for bladder incontinence, dysuria, flank pain, frequency, hematuria and nocturia.   Neurological: Negative for focal weakness, numbness, paresthesias and seizures.   Psychiatric/Behavioral: Negative for altered mental status, hallucinations, hypervigilance, suicidal ideas and thoughts of violence.   Allergic/Immunologic: Negative for persistent infections.         ECG 12 Lead  Date/Time: 1/8/2018 4:08 PM  Performed by: HARDY PALMER III.  Authorized by: HARDY PALMER III   Comparison: compared with previous ECG   Similar to previous ECG  Rhythm: sinus rhythm  Rate: normal  Conduction: conduction normal  ST Segments: ST segments normal  T Waves: T waves normal  QRS axis: normal  Other: no other findings  Clinical impression: normal ECG               Objective:     Vitals:    01/08/18 1414   BP: 118/82   Pulse: 75  "  Weight: 69.4 kg (153 lb)   Height: 149.9 cm (59\")         Physical Exam   Constitutional: She is oriented to person, place, and time. She appears well-developed and well-nourished. No distress.   HENT:   Head: Normocephalic and atraumatic.   Nose: Nose normal.   Mouth/Throat: Oropharynx is clear and moist.   Eyes: Conjunctivae and EOM are normal. Pupils are equal, round, and reactive to light. Right eye exhibits no discharge. Left eye exhibits no discharge.   Neck: Normal range of motion. Neck supple. No tracheal deviation present. No thyromegaly present.   Cardiovascular: Normal rate, regular rhythm, S1 normal, S2 normal, normal heart sounds and normal pulses.  Exam reveals no S3.    Pulmonary/Chest: Effort normal and breath sounds normal. No stridor. No respiratory distress. She exhibits no tenderness.   Abdominal: Soft. Bowel sounds are normal. She exhibits no distension and no mass. There is no tenderness. There is no rebound and no guarding.   Musculoskeletal: Normal range of motion. She exhibits no tenderness or deformity.   Lymphadenopathy:     She has no cervical adenopathy.   Neurological: She is alert and oriented to person, place, and time. She has normal reflexes.   Skin: Skin is warm and dry. No rash noted. She is not diaphoretic. No erythema.   Psychiatric: She has a normal mood and affect. Thought content normal.       Lab Review:             Performed  EKG from your office is reviewed.  This shows nonspecific ST and T-wave changes in the inferolateral leads.      Assessment:          Diagnosis Plan   1. Abnormal ECG  Stress Test With Myocardial Perfusion One Day    ECG 12 Lead   2. Precordial pain  Stress Test With Myocardial Perfusion One Day    ECG 12 Lead   3. SOB (shortness of breath)  Stress Test With Myocardial Perfusion One Day    ECG 12 Lead   4. Palpitations  Stress Test With Myocardial Perfusion One Day    ECG 12 Lead          Plan:       With the patient's symptoms as described above, " risk factors, and EKG findings we will proceed with a stress Cardiolite.    Thank you very much for allowing us to participate in the care of this pleasant patient.  Please don't hesitate to call if I can be of assistance in any way.      Current Outpatient Prescriptions:   •  atorvastatin (LIPITOR) 10 MG tablet, Take 1 tablet by mouth Daily., Disp: 30 tablet, Rfl: 5  •  benzonatate (TESSALON) 200 MG capsule, Take 1 capsule by mouth 3 (Three) Times a Day As Needed for Cough., Disp: 30 capsule, Rfl: 0  •  busPIRone (BUSPAR) 5 MG tablet, Take 1 tablet by mouth 3 (Three) Times a Day., Disp: 60 tablet, Rfl: 11  •  Diclofenac 35 MG capsule, Take 35 mg by mouth 3 (Three) Times a Day As Needed (pain)., Disp: 90 capsule, Rfl: 1  •  fexofenadine (ALLEGRA) 180 MG tablet, Take 180 mg by mouth daily., Disp: , Rfl:   •  lisinopril-hydrochlorothiazide (PRINZIDE,ZESTORETIC) 20-25 MG per tablet, Take 1 tablet by mouth Daily., Disp: 30 tablet, Rfl: 11  •  ondansetron (ZOFRAN) 4 MG tablet, Take 1 tablet by mouth every 8 (eight) hours as needed for nausea or vomiting., Disp: 15 tablet, Rfl: 0  •  oxybutynin XL (DITROPAN-XL) 10 MG 24 hr tablet, Take 1 tablet by mouth Daily., Disp: 30 tablet, Rfl: 0  •  propranolol (INDERAL) 10 MG tablet, Take 1 tablet by mouth Daily., Disp: 30 tablet, Rfl: 0  •  traMADol (ULTRAM) 50 MG tablet, Take 1 tablet by mouth Every 8 (Eight) Hours As Needed for moderate pain (4-6)., Disp: 90 tablet, Rfl: 0

## 2018-01-16 ENCOUNTER — HOSPITAL ENCOUNTER (OUTPATIENT)
Dept: CARDIOLOGY | Facility: HOSPITAL | Age: 53
Discharge: HOME | End: 2018-01-16
Attending: INTERNAL MEDICINE | Admitting: INTERNAL MEDICINE

## 2018-01-16 DIAGNOSIS — R00.2 PALPITATIONS: ICD-10-CM

## 2018-01-16 DIAGNOSIS — R94.31 ABNORMAL ECG: ICD-10-CM

## 2018-01-16 DIAGNOSIS — R06.02 SOB (SHORTNESS OF BREATH): ICD-10-CM

## 2018-01-16 DIAGNOSIS — R07.2 PRECORDIAL PAIN: ICD-10-CM

## 2018-01-16 LAB
BH CV NUCLEAR PRIOR STUDY: 3
BH CV STRESS BP STAGE 1: NORMAL
BH CV STRESS BP STAGE 2: NORMAL
BH CV STRESS DURATION MIN STAGE 1: 3
BH CV STRESS DURATION MIN STAGE 2: 3
BH CV STRESS DURATION SEC STAGE 1: 0
BH CV STRESS DURATION SEC STAGE 2: 0
BH CV STRESS GRADE STAGE 1: 10
BH CV STRESS GRADE STAGE 2: 12
BH CV STRESS HR STAGE 1: 132
BH CV STRESS HR STAGE 2: 149
BH CV STRESS METS STAGE 1: 5
BH CV STRESS METS STAGE 2: 7.5
BH CV STRESS PROTOCOL 1: NORMAL
BH CV STRESS RECOVERY BP: NORMAL MMHG
BH CV STRESS RECOVERY HR: 99 BPM
BH CV STRESS SPEED STAGE 1: 1.7
BH CV STRESS SPEED STAGE 2: 2.5
BH CV STRESS STAGE 1: 1
BH CV STRESS STAGE 2: 2
LV EF NUC BP: 70 %
MAXIMAL PREDICTED HEART RATE: 170 BPM
PERCENT MAX PREDICTED HR: 87.65 %
STRESS BASELINE BP: NORMAL MMHG
STRESS BASELINE HR: 80 BPM
STRESS PERCENT HR: 103 %
STRESS POST ESTIMATED WORKLOAD: 7.5 METS
STRESS POST EXERCISE DUR MIN: 6 MIN
STRESS POST EXERCISE DUR SEC: 0 SEC
STRESS POST PEAK BP: NORMAL MMHG
STRESS POST PEAK HR: 149 BPM
STRESS TARGET HR: 145 BPM

## 2018-01-16 PROCEDURE — 78452 HT MUSCLE IMAGE SPECT MULT: CPT | Performed by: INTERNAL MEDICINE

## 2018-01-16 PROCEDURE — 0 TECHNETIUM TETROFOSMIN KIT: Performed by: INTERNAL MEDICINE

## 2018-01-16 PROCEDURE — 93018 CV STRESS TEST I&R ONLY: CPT | Performed by: INTERNAL MEDICINE

## 2018-01-16 PROCEDURE — 93016 CV STRESS TEST SUPVJ ONLY: CPT | Performed by: INTERNAL MEDICINE

## 2018-01-16 PROCEDURE — A9502 TC99M TETROFOSMIN: HCPCS | Performed by: INTERNAL MEDICINE

## 2018-01-16 PROCEDURE — 78452 HT MUSCLE IMAGE SPECT MULT: CPT

## 2018-01-16 PROCEDURE — 93017 CV STRESS TEST TRACING ONLY: CPT

## 2018-01-16 RX ADMIN — TETROFOSMIN 1 DOSE: 1.38 INJECTION, POWDER, LYOPHILIZED, FOR SOLUTION INTRAVENOUS at 10:05

## 2018-01-16 RX ADMIN — TETROFOSMIN 1 DOSE: 1.38 INJECTION, POWDER, LYOPHILIZED, FOR SOLUTION INTRAVENOUS at 09:15

## 2018-01-18 ENCOUNTER — OFFICE VISIT (OUTPATIENT)
Dept: FAMILY MEDICINE CLINIC | Facility: CLINIC | Age: 53
End: 2018-01-18

## 2018-01-18 VITALS
RESPIRATION RATE: 16 BRPM | HEIGHT: 59 IN | TEMPERATURE: 98 F | HEART RATE: 88 BPM | WEIGHT: 157 LBS | SYSTOLIC BLOOD PRESSURE: 162 MMHG | DIASTOLIC BLOOD PRESSURE: 87 MMHG | OXYGEN SATURATION: 95 % | BODY MASS INDEX: 31.65 KG/M2

## 2018-01-18 DIAGNOSIS — I10 ESSENTIAL HYPERTENSION: ICD-10-CM

## 2018-01-18 DIAGNOSIS — F41.9 ANXIETY: Primary | ICD-10-CM

## 2018-01-18 PROCEDURE — 99213 OFFICE O/P EST LOW 20 MIN: CPT | Performed by: NURSE PRACTITIONER

## 2018-01-18 RX ORDER — LISINOPRIL AND HYDROCHLOROTHIAZIDE 25; 20 MG/1; MG/1
1 TABLET ORAL DAILY
Qty: 30 TABLET | Refills: 11 | Status: SHIPPED | OUTPATIENT
Start: 2018-01-18 | End: 2018-11-30 | Stop reason: SDUPTHER

## 2018-01-18 NOTE — PROGRESS NOTES
Subjective   Mason Parada is a 52 y.o. female.     History of Present Illness   Mason Parada 52 y.o. female who presents today for routine follow up check and medication refills.  she has a history of   Patient Active Problem List   Diagnosis   • Essential hypertension   • Gastroesophageal reflux disease without esophagitis   • Seasonal allergies   .  Since the last visit, she has overall felt anxious.  She has has been having elevated blood pressure readings and here to evaluate this.  She has been out of medication.  she has been compliant with current medications have reviewed them.  The patient denies medication side effects.    Results for orders placed or performed during the hospital encounter of 01/16/18   Stress Test With Myocardial Perfusion One Day   Result Value Ref Range    Nuclear Prior Study 3     BH CV STRESS PROTOCOL 1 [place]     Stage 1 1     HR Stage 1 132     BP Stage 1 136/82     Duration Min Stage 1 3     Duration Sec Stage 1 0     Grade Stage 1 10     Speed Stage 1 1.7     BH CV STRESS METS STAGE 1 5     Stage 2 2     HR Stage 2 149     BP Stage 2 152/86     Duration Min Stage 2 3     Duration Sec Stage 2 0     Grade Stage 2 12     Speed Stage 2 2.5     BH CV STRESS METS STAGE 2 7.5     Baseline HR 80 bpm    Baseline /78 mmHg    Peak  bpm    Percent Max Pred HR 87.65 %    Percent Target  %    Peak /86 mmHg    Recovery HR 99 bpm    Recovery /84 mmHg    Target HR (85%) 145 bpm    Max. Pred. HR (100%) 170 bpm    Exercise duration (min) 6 min    Exercise duration (sec) 0 sec    Estimated workload 7.5 METS    Nuc Stress EF 70 %     Patient had nuclear stress test which was negative. She states she has not had any episodes of chest pain since.     Patient states she has been out of her medication for a week as pharmacy told her it was too soon to fill. States this is the second time this has happened.  She states she is taking as prescribed so should not run out.       She is taking buspar and propranolol and states it is helping with anxiety.   The following portions of the patient's history were reviewed and updated as appropriate: allergies, current medications, past family history, past medical history, past social history, past surgical history and problem list.    Review of Systems   Constitutional: Negative for unexpected weight change.   Respiratory: Negative for shortness of breath.    Cardiovascular: Negative for chest pain, palpitations and leg swelling.   Psychiatric/Behavioral: Negative for behavioral problems.       Objective   Physical Exam   Constitutional: She is oriented to person, place, and time. She appears well-developed and well-nourished.   Cardiovascular: Normal rate and regular rhythm.    Pulmonary/Chest: Effort normal and breath sounds normal.   Neurological: She is alert and oriented to person, place, and time.   Psychiatric: She has a normal mood and affect. Her behavior is normal. Judgment and thought content normal.   Nursing note and vitals reviewed.      Assessment/Plan   Mason was seen today for chest pain and hypertension.    Diagnoses and all orders for this visit:    Anxiety    Essential hypertension  -     lisinopril-hydrochlorothiazide (PRINZIDE,ZESTORETIC) 20-25 MG per tablet; Take 1 tablet by mouth Daily.

## 2018-01-24 DIAGNOSIS — R00.2 HEART PALPITATIONS: ICD-10-CM

## 2018-01-24 RX ORDER — PROPRANOLOL HYDROCHLORIDE 10 MG/1
TABLET ORAL
Qty: 30 TABLET | Refills: 0 | Status: SHIPPED | OUTPATIENT
Start: 2018-01-24 | End: 2018-11-30

## 2018-02-05 DIAGNOSIS — G56.01 CARPAL TUNNEL SYNDROME OF RIGHT WRIST: ICD-10-CM

## 2018-02-06 RX ORDER — DICLOFENAC 35 MG/1
CAPSULE ORAL
Qty: 90 CAPSULE | Refills: 2 | OUTPATIENT
Start: 2018-02-06

## 2018-02-13 DIAGNOSIS — G56.01 CARPAL TUNNEL SYNDROME OF RIGHT WRIST: ICD-10-CM

## 2018-02-13 RX ORDER — DICLOFENAC 35 MG/1
CAPSULE ORAL
Qty: 90 CAPSULE | Refills: 2 | Status: SHIPPED | OUTPATIENT
Start: 2018-02-13 | End: 2018-05-08 | Stop reason: SDUPTHER

## 2018-03-08 ENCOUNTER — OFFICE VISIT (OUTPATIENT)
Dept: FAMILY MEDICINE CLINIC | Facility: CLINIC | Age: 53
End: 2018-03-08

## 2018-03-08 VITALS
DIASTOLIC BLOOD PRESSURE: 88 MMHG | SYSTOLIC BLOOD PRESSURE: 153 MMHG | RESPIRATION RATE: 16 BRPM | WEIGHT: 155 LBS | HEART RATE: 90 BPM | TEMPERATURE: 98.1 F | HEIGHT: 59 IN | BODY MASS INDEX: 31.25 KG/M2

## 2018-03-08 DIAGNOSIS — R21 RASH: ICD-10-CM

## 2018-03-08 DIAGNOSIS — R21 RASH: Primary | ICD-10-CM

## 2018-03-08 PROCEDURE — 99213 OFFICE O/P EST LOW 20 MIN: CPT | Performed by: FAMILY MEDICINE

## 2018-03-08 RX ORDER — PREDNISONE 20 MG/1
TABLET ORAL
Qty: 20 TABLET | Refills: 0 | Status: SHIPPED | OUTPATIENT
Start: 2018-03-08 | End: 2018-03-08 | Stop reason: SDUPTHER

## 2018-03-08 RX ORDER — PREDNISONE 20 MG/1
TABLET ORAL
Qty: 20 TABLET | Refills: 0 | Status: SHIPPED | OUTPATIENT
Start: 2018-03-08 | End: 2018-11-30

## 2018-03-08 NOTE — PROGRESS NOTES
"Subjective   Mason Parada is a 52 y.o. female.     CC: Rash    History of Present Illness     Pt comes in today c/o a rash over the face/chest/arms/neck. This started about 2 weeks ago after a recent hair coloring. No dyspnea. Does have some tendency to react to things and get itchy. Using Claritin w/o help.       The following portions of the patient's history were reviewed and updated as appropriate: allergies, current medications, past family history, past medical history, past social history, past surgical history and problem list.    Review of Systems   Constitutional: Negative for activity change, chills, fatigue and fever.   Respiratory: Negative for cough and chest tightness.    Cardiovascular: Negative for chest pain and palpitations.   Gastrointestinal: Negative for abdominal pain and nausea.   Endocrine: Negative for cold intolerance.   Skin: Positive for rash.   Psychiatric/Behavioral: Negative for behavioral problems and dysphoric mood.     /88  Pulse 90  Temp 98.1 °F (36.7 °C)  Resp 16  Ht 149.9 cm (59\")  Wt 70.3 kg (155 lb)  BMI 31.31 kg/m2    Objective   Physical Exam   Constitutional: She appears well-developed and well-nourished.   Neck: Neck supple. No thyromegaly present.   Cardiovascular: Normal rate and regular rhythm.    No murmur heard.  Pulmonary/Chest: Effort normal and breath sounds normal.   Abdominal: Bowel sounds are normal.   Skin:   Scattered erythematous papules on above noted distribution noted   Psychiatric: She has a normal mood and affect. Her behavior is normal.   Nursing note and vitals reviewed.      Assessment/Plan   Mason was seen today for rash.    Diagnoses and all orders for this visit:    Rash  Comments:  Allergic  Orders:  -     predniSONE (DELTASONE) 20 MG tablet; Take 3 tabs QDPC x 3 days then 2 tabs QDPC x 4 days then 1 tab QDPC x 3 days    Pt to call her allergist and be seen.          "

## 2018-03-08 NOTE — TELEPHONE ENCOUNTER
Pharm did not get medications per moris in appointments - (pt called) resent prescription again. darius

## 2018-05-08 DIAGNOSIS — G56.01 CARPAL TUNNEL SYNDROME OF RIGHT WRIST: ICD-10-CM

## 2018-05-11 RX ORDER — DICLOFENAC 35 MG/1
CAPSULE ORAL
Qty: 90 CAPSULE | Refills: 1 | Status: SHIPPED | OUTPATIENT
Start: 2018-05-11 | End: 2018-07-10 | Stop reason: SDUPTHER

## 2018-07-10 DIAGNOSIS — G56.01 CARPAL TUNNEL SYNDROME OF RIGHT WRIST: ICD-10-CM

## 2018-07-11 RX ORDER — DICLOFENAC 35 MG/1
CAPSULE ORAL
Qty: 90 CAPSULE | Refills: 2 | Status: SHIPPED | OUTPATIENT
Start: 2018-07-11 | End: 2018-10-09 | Stop reason: SDUPTHER

## 2018-09-28 DIAGNOSIS — G56.01 CARPAL TUNNEL SYNDROME OF RIGHT WRIST: ICD-10-CM

## 2018-10-01 RX ORDER — DICLOFENAC 35 MG/1
CAPSULE ORAL
Qty: 90 CAPSULE | Refills: 1 | OUTPATIENT
Start: 2018-10-01

## 2018-10-09 DIAGNOSIS — G56.01 CARPAL TUNNEL SYNDROME OF RIGHT WRIST: ICD-10-CM

## 2018-10-09 RX ORDER — DICLOFENAC 35 MG/1
1 CAPSULE ORAL 3 TIMES DAILY PRN
Qty: 90 CAPSULE | Refills: 2 | Status: SHIPPED | OUTPATIENT
Start: 2018-10-09 | End: 2019-01-11 | Stop reason: SDUPTHER

## 2018-11-01 ENCOUNTER — APPOINTMENT (OUTPATIENT)
Dept: WOMENS IMAGING | Facility: HOSPITAL | Age: 53
End: 2018-11-01

## 2018-11-01 PROCEDURE — 77067 SCR MAMMO BI INCL CAD: CPT | Performed by: RADIOLOGY

## 2018-11-01 PROCEDURE — 77063 BREAST TOMOSYNTHESIS BI: CPT | Performed by: RADIOLOGY

## 2018-11-30 ENCOUNTER — OFFICE VISIT (OUTPATIENT)
Dept: FAMILY MEDICINE CLINIC | Facility: CLINIC | Age: 53
End: 2018-11-30

## 2018-11-30 VITALS
BODY MASS INDEX: 31.04 KG/M2 | WEIGHT: 154 LBS | SYSTOLIC BLOOD PRESSURE: 121 MMHG | TEMPERATURE: 98.4 F | HEIGHT: 59 IN | HEART RATE: 78 BPM | OXYGEN SATURATION: 98 % | DIASTOLIC BLOOD PRESSURE: 84 MMHG

## 2018-11-30 DIAGNOSIS — E78.2 MIXED HYPERLIPIDEMIA: Primary | ICD-10-CM

## 2018-11-30 DIAGNOSIS — F41.9 ANXIETY: ICD-10-CM

## 2018-11-30 DIAGNOSIS — E55.9 VITAMIN D DEFICIENCY: ICD-10-CM

## 2018-11-30 DIAGNOSIS — I10 ESSENTIAL HYPERTENSION: ICD-10-CM

## 2018-11-30 DIAGNOSIS — H10.13 ALLERGIC CONJUNCTIVITIS OF BOTH EYES: ICD-10-CM

## 2018-11-30 PROCEDURE — 99214 OFFICE O/P EST MOD 30 MIN: CPT | Performed by: NURSE PRACTITIONER

## 2018-11-30 RX ORDER — ATORVASTATIN CALCIUM 10 MG/1
10 TABLET, FILM COATED ORAL DAILY
Qty: 30 TABLET | Refills: 11 | Status: SHIPPED | OUTPATIENT
Start: 2018-11-30 | End: 2019-02-25

## 2018-11-30 RX ORDER — KETOTIFEN FUMARATE 0.35 MG/ML
1 SOLUTION/ DROPS OPHTHALMIC 2 TIMES DAILY
Qty: 1 EACH | Refills: 5 | Status: SHIPPED | OUTPATIENT
Start: 2018-11-30 | End: 2019-07-07 | Stop reason: SDUPTHER

## 2018-11-30 RX ORDER — LISINOPRIL AND HYDROCHLOROTHIAZIDE 25; 20 MG/1; MG/1
1 TABLET ORAL DAILY
Qty: 30 TABLET | Refills: 11 | Status: SHIPPED | OUTPATIENT
Start: 2018-11-30 | End: 2019-02-25

## 2018-11-30 RX ORDER — MEDROXYPROGESTERONE ACETATE 2.5 MG/1
2.5 TABLET ORAL DAILY
COMMUNITY
Start: 2018-11-30 | End: 2019-02-25

## 2018-11-30 RX ORDER — HYDROXYZINE HYDROCHLORIDE 25 MG/1
25 TABLET, FILM COATED ORAL 3 TIMES DAILY PRN
Qty: 30 TABLET | Refills: 0 | Status: SHIPPED | OUTPATIENT
Start: 2018-11-30 | End: 2018-12-27

## 2018-11-30 RX ORDER — ESTRADIOL 0.05 MG/D
1 PATCH TRANSDERMAL 2 TIMES WEEKLY
COMMUNITY
Start: 2018-12-03 | End: 2019-02-25

## 2018-11-30 NOTE — PROGRESS NOTES
Subjective   Mason Parada is a 53 y.o. female.     History of Present Illness   Was started on HRT per Dr. Hernandez with [facility]. Patient has been on medications for a month and states she has not noticed much change.  She has f/u in January.  PAP was normal but she had abnormal mammogram and is scheduled for US within the next 2 weeks.        Patient sees ophthalmologist for dry eyes.  States she was started on Systane but has not noticed any improvement.  She feels it is allergy related as she has itching and puffiness at times.  She is still taking allegra and using flonase daily.     She has not been taking her atorvastatin.  She has been taking her lisinopril/hctz as prescribed and BP is well controlled.  She states she still has anxiety but is not taking any of the medications for this.  She had a job change recently and feels that it will be a better fit for her.    The following portions of the patient's history were reviewed and updated as appropriate: allergies, current medications, past family history, past medical history, past social history, past surgical history and problem list.    Review of Systems   Constitutional: Negative for unexpected weight change.   Eyes: Positive for itching. Negative for photophobia, pain, discharge, redness and visual disturbance.   Respiratory: Negative for shortness of breath.    Cardiovascular: Negative for chest pain and palpitations.   Endocrine: Negative for cold intolerance, heat intolerance, polydipsia, polyphagia and polyuria.   Psychiatric/Behavioral: Negative for behavioral problems.       Objective   Physical Exam   Constitutional: She is oriented to person, place, and time. She appears well-developed and well-nourished.   Eyes: Conjunctivae, EOM and lids are normal. Pupils are equal, round, and reactive to light.   Neck: Carotid bruit is not present.   Cardiovascular: Normal rate and regular rhythm.   Pulmonary/Chest: Effort normal and breath sounds  normal.   Neurological: She is alert and oriented to person, place, and time.   Psychiatric: She has a normal mood and affect. Judgment normal.   Nursing note and vitals reviewed.      Assessment/Plan   Mason was seen today for menopause, dry eyes and itching all over.    Diagnoses and all orders for this visit:    Mixed hyperlipidemia  -     Comprehensive metabolic panel  -     Lipid panel  -     CBC and Differential  -     TSH  -     atorvastatin (LIPITOR) 10 MG tablet; Take 1 tablet by mouth Daily.    Vitamin D deficiency  -     Vitamin D 25 Hydroxy    Allergic conjunctivitis of both eyes  -     ketotifen (ZADITOR) 0.025 % ophthalmic solution; Administer 1 drop to both eyes 2 (Two) Times a Day.    Anxiety  -     hydrOXYzine (ATARAX) 25 MG tablet; Take 1 tablet by mouth 3 (Three) Times a Day As Needed for Itching or Anxiety.    Essential hypertension  -     lisinopril-hydrochlorothiazide (PRINZIDE,ZESTORETIC) 20-25 MG per tablet; Take 1 tablet by mouth Daily.          Patient will continue systane.  She will stop allegra and continue flonase. She will start antihistamine eye drops BID.  She will f/u with ophthalmology.

## 2018-12-10 LAB
25(OH)D3+25(OH)D2 SERPL-MCNC: 28.4 NG/ML (ref 30–100)
ALBUMIN SERPL-MCNC: 5 G/DL (ref 3.5–5.2)
ALBUMIN/GLOB SERPL: 2.3 G/DL
ALP SERPL-CCNC: 88 U/L (ref 39–117)
ALT SERPL-CCNC: 18 U/L (ref 1–33)
AST SERPL-CCNC: 19 U/L (ref 1–32)
BASOPHILS # BLD AUTO: 0.04 10*3/MM3 (ref 0–0.2)
BASOPHILS NFR BLD AUTO: 0.4 % (ref 0–1.5)
BILIRUB SERPL-MCNC: 0.5 MG/DL (ref 0.1–1.2)
BUN SERPL-MCNC: 20 MG/DL (ref 6–20)
BUN/CREAT SERPL: 25 (ref 7–25)
CALCIUM SERPL-MCNC: 10.1 MG/DL (ref 8.6–10.5)
CHLORIDE SERPL-SCNC: 104 MMOL/L (ref 98–107)
CHOLEST SERPL-MCNC: 154 MG/DL (ref 0–200)
CO2 SERPL-SCNC: 25.9 MMOL/L (ref 22–29)
CREAT SERPL-MCNC: 0.8 MG/DL (ref 0.57–1)
EOSINOPHIL # BLD AUTO: 0.28 10*3/MM3 (ref 0–0.7)
EOSINOPHIL NFR BLD AUTO: 3.1 % (ref 0.3–6.2)
ERYTHROCYTE [DISTWIDTH] IN BLOOD BY AUTOMATED COUNT: 12.4 % (ref 11.7–13)
GLOBULIN SER CALC-MCNC: 2.2 GM/DL
GLUCOSE SERPL-MCNC: 116 MG/DL (ref 65–99)
HCT VFR BLD AUTO: 42 % (ref 35.6–45.5)
HDLC SERPL-MCNC: 37 MG/DL (ref 40–60)
HGB BLD-MCNC: 13.5 G/DL (ref 11.9–15.5)
IMM GRANULOCYTES # BLD: 0.02 10*3/MM3 (ref 0–0.03)
IMM GRANULOCYTES NFR BLD: 0.2 % (ref 0–0.5)
LDLC SERPL CALC-MCNC: 93 MG/DL (ref 0–100)
LYMPHOCYTES # BLD AUTO: 1.79 10*3/MM3 (ref 0.9–4.8)
LYMPHOCYTES NFR BLD AUTO: 20.1 % (ref 19.6–45.3)
MCH RBC QN AUTO: 29.9 PG (ref 26.9–32)
MCHC RBC AUTO-ENTMCNC: 32.1 G/DL (ref 32.4–36.3)
MCV RBC AUTO: 93.1 FL (ref 80.5–98.2)
MONOCYTES # BLD AUTO: 0.56 10*3/MM3 (ref 0.2–1.2)
MONOCYTES NFR BLD AUTO: 6.3 % (ref 5–12)
NEUTROPHILS # BLD AUTO: 6.22 10*3/MM3 (ref 1.9–8.1)
NEUTROPHILS NFR BLD AUTO: 69.9 % (ref 42.7–76)
PLATELET # BLD AUTO: 374 10*3/MM3 (ref 140–500)
POTASSIUM SERPL-SCNC: 4.3 MMOL/L (ref 3.5–5.2)
PROT SERPL-MCNC: 7.2 G/DL (ref 6–8.5)
RBC # BLD AUTO: 4.51 10*6/MM3 (ref 3.9–5.2)
SODIUM SERPL-SCNC: 141 MMOL/L (ref 136–145)
TRIGL SERPL-MCNC: 119 MG/DL (ref 0–150)
TSH SERPL DL<=0.005 MIU/L-ACNC: 2.09 MIU/ML (ref 0.27–4.2)
VLDLC SERPL CALC-MCNC: 23.8 MG/DL (ref 5–40)
WBC # BLD AUTO: 8.91 10*3/MM3 (ref 4.5–10.7)

## 2018-12-27 ENCOUNTER — OFFICE VISIT (OUTPATIENT)
Dept: FAMILY MEDICINE CLINIC | Facility: CLINIC | Age: 53
End: 2018-12-27

## 2018-12-27 VITALS
HEIGHT: 59 IN | RESPIRATION RATE: 16 BRPM | DIASTOLIC BLOOD PRESSURE: 78 MMHG | TEMPERATURE: 99 F | BODY MASS INDEX: 31.65 KG/M2 | HEART RATE: 81 BPM | SYSTOLIC BLOOD PRESSURE: 138 MMHG | WEIGHT: 157 LBS | OXYGEN SATURATION: 98 %

## 2018-12-27 DIAGNOSIS — F41.9 ANXIETY: ICD-10-CM

## 2018-12-27 DIAGNOSIS — Z23 NEED FOR VACCINATION: ICD-10-CM

## 2018-12-27 DIAGNOSIS — F41.9 ANXIETY: Primary | ICD-10-CM

## 2018-12-27 PROCEDURE — 90750 HZV VACC RECOMBINANT IM: CPT | Performed by: NURSE PRACTITIONER

## 2018-12-27 PROCEDURE — 90471 IMMUNIZATION ADMIN: CPT | Performed by: NURSE PRACTITIONER

## 2018-12-27 PROCEDURE — 99212 OFFICE O/P EST SF 10 MIN: CPT | Performed by: NURSE PRACTITIONER

## 2018-12-27 RX ORDER — HYDROXYZINE HYDROCHLORIDE 25 MG/1
TABLET, FILM COATED ORAL
Qty: 30 TABLET | Refills: 0 | OUTPATIENT
Start: 2018-12-27

## 2018-12-27 RX ORDER — HYDROXYZINE HYDROCHLORIDE 25 MG/1
25 TABLET, FILM COATED ORAL 3 TIMES DAILY PRN
Qty: 60 TABLET | Refills: 11 | Status: SHIPPED | OUTPATIENT
Start: 2018-12-27 | End: 2020-01-02

## 2018-12-27 NOTE — PROGRESS NOTES
Subjective   Mason Parada is a 53 y.o. female.     History of Present Illness   Mason Parada female 53 y.o. who presents today for follow up of Anxiety.  She reports medication is working well, patient desires to continue on Rx, and needs refill.  She denies current suicidal and homicidal ideation. Risk factors are prior diagnosis of anxiety.  Previous treatment includes current Rx.  She complains of the following medication side effects: none.      Uses hydroxyzine once daily at bedtime most days. States it has helped with itching and anxiety.      Also states antihistamine eye drops helped with dry, itchy eyes.   The following portions of the patient's history were reviewed and updated as appropriate: allergies, current medications, past family history, past medical history, past social history, past surgical history and problem list.    Review of Systems   Constitutional: Negative for unexpected weight change.   Respiratory: Negative for shortness of breath.    Cardiovascular: Negative for chest pain and palpitations.   Psychiatric/Behavioral: Negative for behavioral problems. The patient is nervous/anxious.        Objective   Physical Exam   Constitutional: She is oriented to person, place, and time. She appears well-developed and well-nourished.   Pulmonary/Chest: Effort normal.   Neurological: She is alert and oriented to person, place, and time.   Psychiatric: She has a normal mood and affect. Her behavior is normal. Judgment and thought content normal.   Nursing note and vitals reviewed.      Assessment/Plan   Mason was seen today for med management.    Diagnoses and all orders for this visit:    Anxiety  -     hydrOXYzine (ATARAX) 25 MG tablet; Take 1 tablet by mouth 3 (Three) Times a Day As Needed for Itching or Anxiety.    Need for vaccination  -     Shingrix Vaccine

## 2019-01-04 ENCOUNTER — APPOINTMENT (OUTPATIENT)
Dept: WOMENS IMAGING | Facility: HOSPITAL | Age: 54
End: 2019-01-04

## 2019-01-04 PROCEDURE — 77062 BREAST TOMOSYNTHESIS BI: CPT | Performed by: RADIOLOGY

## 2019-01-04 PROCEDURE — 77066 DX MAMMO INCL CAD BI: CPT | Performed by: RADIOLOGY

## 2019-01-04 PROCEDURE — 76641 ULTRASOUND BREAST COMPLETE: CPT | Performed by: RADIOLOGY

## 2019-01-04 PROCEDURE — G0279 TOMOSYNTHESIS, MAMMO: HCPCS | Performed by: RADIOLOGY

## 2019-01-11 ENCOUNTER — TELEPHONE (OUTPATIENT)
Dept: FAMILY MEDICINE CLINIC | Facility: CLINIC | Age: 54
End: 2019-01-11

## 2019-01-11 DIAGNOSIS — G56.01 CARPAL TUNNEL SYNDROME OF RIGHT WRIST: ICD-10-CM

## 2019-01-11 RX ORDER — DICLOFENAC 35 MG/1
1 CAPSULE ORAL 3 TIMES DAILY PRN
Qty: 90 CAPSULE | Refills: 2 | Status: SHIPPED | OUTPATIENT
Start: 2019-01-11 | End: 2019-02-25

## 2019-01-28 ENCOUNTER — APPOINTMENT (OUTPATIENT)
Dept: WOMENS IMAGING | Facility: HOSPITAL | Age: 54
End: 2019-01-28

## 2019-01-28 PROCEDURE — 76942 ECHO GUIDE FOR BIOPSY: CPT | Performed by: RADIOLOGY

## 2019-01-28 PROCEDURE — 19081 BX BREAST 1ST LESION STRTCTC: CPT | Performed by: RADIOLOGY

## 2019-01-28 PROCEDURE — 19000 PUNCTURE ASPIR CYST BREAST: CPT | Performed by: RADIOLOGY

## 2019-01-28 PROCEDURE — 19082 BX BREAST ADD LESION STRTCTC: CPT | Performed by: RADIOLOGY

## 2019-01-29 ENCOUNTER — TELEPHONE (OUTPATIENT)
Dept: SURGERY | Facility: CLINIC | Age: 54
End: 2019-01-29

## 2019-01-29 PROBLEM — D05.12 DUCTAL CARCINOMA IN SITU (DCIS) OF LEFT BREAST: Status: ACTIVE | Noted: 2019-01-29

## 2019-01-29 NOTE — TELEPHONE ENCOUNTER
Breast MRI is scheduled on 1/31/2019 @ 8:15am.    New patient appointment with Dr. Cat is scheduled on 2/04/2019 to arrive @ 8:30am.    Called and spoke to patient, patient expressed verbal understanding of appointment times.    Sent patient a reminder letter and a new patient packet.

## 2019-01-31 ENCOUNTER — HOSPITAL ENCOUNTER (OUTPATIENT)
Dept: MRI IMAGING | Facility: HOSPITAL | Age: 54
Discharge: HOME | End: 2019-01-31
Attending: SURGERY | Admitting: SURGERY

## 2019-01-31 DIAGNOSIS — D05.12 DUCTAL CARCINOMA IN SITU (DCIS) OF LEFT BREAST: ICD-10-CM

## 2019-01-31 PROCEDURE — A9577 INJ MULTIHANCE: HCPCS | Performed by: SURGERY

## 2019-01-31 PROCEDURE — 82565 ASSAY OF CREATININE: CPT

## 2019-01-31 PROCEDURE — 77049 MRI BREAST C-+ W/CAD BI: CPT

## 2019-01-31 PROCEDURE — 0 GADOBENATE DIMEGLUMINE 529 MG/ML SOLUTION: Performed by: SURGERY

## 2019-01-31 RX ADMIN — GADOBENATE DIMEGLUMINE 14 ML: 529 INJECTION, SOLUTION INTRAVENOUS at 09:24

## 2019-02-01 LAB — CREAT BLDA-MCNC: 0.7 MG/DL (ref 0.6–1.3)

## 2019-02-04 ENCOUNTER — TELEPHONE (OUTPATIENT)
Dept: SURGERY | Facility: CLINIC | Age: 54
End: 2019-02-04

## 2019-02-04 ENCOUNTER — OFFICE VISIT (OUTPATIENT)
Dept: SURGERY | Facility: CLINIC | Age: 54
End: 2019-02-04

## 2019-02-04 ENCOUNTER — PREP FOR SURGERY (OUTPATIENT)
Dept: OTHER | Facility: HOSPITAL | Age: 54
End: 2019-02-04

## 2019-02-04 VITALS
HEIGHT: 59 IN | TEMPERATURE: 98.7 F | WEIGHT: 152 LBS | DIASTOLIC BLOOD PRESSURE: 81 MMHG | HEART RATE: 88 BPM | BODY MASS INDEX: 30.64 KG/M2 | SYSTOLIC BLOOD PRESSURE: 125 MMHG

## 2019-02-04 DIAGNOSIS — D05.12 DUCTAL CARCINOMA IN SITU (DCIS) OF LEFT BREAST: Primary | ICD-10-CM

## 2019-02-04 PROCEDURE — 99245 OFF/OP CONSLTJ NEW/EST HI 55: CPT | Performed by: SURGERY

## 2019-02-04 RX ORDER — HEPARIN SODIUM 5000 [USP'U]/ML
5000 INJECTION, SOLUTION INTRAVENOUS; SUBCUTANEOUS
Status: CANCELLED | OUTPATIENT
Start: 2019-03-05 | End: 2019-03-06

## 2019-02-04 NOTE — TELEPHONE ENCOUNTER
EMLA cream called in to [facility] pharmacy on file. Patient informed and directions for use reviewed. TAVARES

## 2019-02-04 NOTE — PROGRESS NOTES
[facility]     Referring Provider: Julia Whitlock MD     Chief complaint: Newly diagnosed DCIS     HPI: Ms. Mason Parada is a 52 yo woman, seen at the request of Dr. Julia Whitlock, for a new diagnosis of left breast DCIS. This was initially detected as an imaging abnormality. Her work-up is detailed in the oncologic history below. She denies any breast lumps, pain, skin changes, or nipple discharge. She denies any prior history of abnormal mammograms or breast biopsies. She has a family history of breast cancer in a paternal aunt, diagnosed in her late 60's. She has a family history of ovarian cancer in her sister, diagnosed at age 34. She was joined today in clinic by her two daughters. They all participated in the discussion, after her examination, and she gave her consent for them to participate.       Oncology/Hematology History    2008, Screening MMG: Negative, per pt report.         Ductal carcinoma in situ (DCIS) of left breast    10/31/2018 Initial Diagnosis     Ductal carcinoma in situ (DCIS) of left breast         11/1/2018 Imaging     Screening MMG with [facility] ([facility]): Heterogeneously dense.  1. There is a focal asymmetry and calcifications seen in the posterior one-third central region of the right breast.  2. There is a focal asymmetry with associated calcifications seen in the middle one-third central region of the right breast.  3. There are calcifications seen in the posterior one-third superior region of the left breast.  4. There are prominent axillary lymph nodes in the left axilla.  BI-RADS 0: Incomplete.         1/4/2019 Imaging     Bilateral Diagnostic MMG with [facility]so & Bilateral US ([facility]):   MMG: Heterogeneously dense.  1. On the present examination, focal asymmetry in the right breast, central does not persist. The asymmetry noted on the recent screening mammogram resolves into stroma on additional views.  2. There are multiple amorphous and indistinct calcifications with segmental  distribution in the middle one-third of the left breast at 12-1 o'clock, central located 7 cm from the nipple. These span approximately 2 cm. The asymmetry noted on the recent mammogram resolves into stroma on additional views.  3. There are several punctate and indistinct calcifications with linear distribution in the posterior one-third 1:30 o'clock region of the left breast located 8 cm from the nipple. These span approximately 1 cm.  4. There are a few small axillary lymph nodes in the left axilla.  US:  1. In the central, posterior one third region of the right breast, there is no evidence of any solid mass or abnormal cystic elements.  2. In the central, middle one-third of the left breast at 12-1 o'clock, there is no discrete solid or suspicious sonographic abnormalities.   4. Ultrasound is suggestive of a few fatty lymph nodes. The largest measures 19 x 8 x 12 mm. This demonstrates prominent cortex measuring up to 3 mm. The remaining smaller nodes demonstrate normal morphology and size.  5. There is an oval cluster of cyst with partially defined margins measuring 10 x 4 x 7 mm seen in the sub-areolar region of the left breast. Internal echotexture is heterogeneous.   6. There are a few small simple cyst seen in the left breast. These measure less than 1 cm.  IMPRESSION:  1. Area in the right breast is benign-negative.  2. Calcifications in the middle one third of the left breast at 12 to 1:00, centrally located 7 cm from the nipple are suspicious. Biopsy is recommended.  3. Calcifications in the posterior one third 1:00 region of the left breast located 8 cm from the nipple are suspicious. Biopsy is recommended.  4. Fatty lymph nodes in the left axilla are suspicious. Aspiration is recommended.  5. Cluster of cysts in the subareolar region of the left breast is suspicious. Cyst aspiration is recommended.  6. Simple cysts in the left breast are benign-negative.  BI-RADS 4B: Suspicious.          1/28/2019  "Biopsy     Left breast, subareolar, Cyst Aspiration ([facility]):   Left subareolar breast tissue, ultrasound guided aspiration, thin prep:   No malignant cells identified. Cytologic features consistent with benign fibrocystic changes.  -1/4 cc of blood-tinged fluid was aspirated. The walls of the cyst collapsed. There was partial sonographic resolution. Concordant.    Left axillary lymph node FNA ([facility]):  -Procedure not performed, due to no suspicious lymph nodes on 1/28/19 exam.         1/28/2019 Biopsy     Left breast, Stereotactic Biopsy x 2 ([facility]):     1. Central Left breast Tissue, 12-1:00, \"tophat\" clip:  Intermediate Grade Cribriform Ductal Carcinoma in Situ with Central Necrosis, Microcalcification and Lobular Extension.   Largest focus of DCIS measuring 6 mm.   No invasive carcinoma identified.   Fibrofatty breast tissue also showing florid intraductal hyperplasia of the usual type, cystically dilated and ectatic ducts, patchy mild chronic stromal inflammation, and stromal microcalcifications.     ER+ (98.96%, strong)  OH+ 92.91%, strong)  Ki67 3.18%    2. Left Breast Tissue, 1:30, \"minicork\" clip:   Low to Intermediate Grade Cribriform and Solid Ductal Carcinoma in Situ with Central Necrosis, Microcalcifications and Lobular Extension.   Largest focus of DCIS measuring 4 mm.   No invasive carcinoma identified.   Fibrofatty breast tissue also showing columnar cell change, patchy mild chronic stromal inflammation and stromal microcalcifications.     ER+ (98.04%, strong)  OH+ (88.62%, strong)  Ki67 4.4%         1/31/2019 Imaging     [facility]   Within the middle third of the left breast at the 12-o'clock position on the order of 5.5 cm from the nipple there is a metallic clip seen within a postbiopsy cavity that measures on the order of 1.4 cm in the superior to inferior dimension, 3.2 cm in anterior to posterior dimension and 3.1 cm in medial to lateral dimension. A metallic clip is seen within the central " portion of the cavity. The metallic clip represents the T-shaped metallic clip seen on the postbiopsy mammogram. This represents a biopsy-proven site of malignancy. Clumped enhancement that has an aggregate measures on the order of 1.8 cm in superior to inferior dimension, 1.4 cm in anterior to posterior dimension and 1.3 cm in the medial to lateral dimension, as seen along the lateral margin of the T-shaped metallic clip. The postbiopsy mammogram is somewhat difficult to evaluate due to postbiopsy change, but calcifications are seen within this region.  In the posterior one-third of the upper quadrant of the left breast centered at the 1-o'clock position on the order of 7.7 cm from the nipple there is a metallic clip which represents the barrel-shaped metallic clip. This metallic clip is located on the order of 1.3 cm superior to clumped enhancement that measures on the order of 1.1 cm in anterior to posterior dimension, 0.7 cm in the superior to inferior dimension and 0.7 cm in the medial to lateral dimension. This represents a biopsy-proven site of malignancy. Mammographically on the postbiopsy mammogram, there is a suggestion of some microcalcifications seen inferior to this barrel-shaped metallic clip that appear to correspond to the enhancement seen in this region. No other areas of abnormal enhancement or morphology are seen within the left breast. There is no evidence for left axillary adenopathy. There are no findings suspicious for malignancy in the right breast.  BI-RADS 6: Known biopsy-proven malignancy.             Review of Systems   Constitutional: Positive for fatigue and unexpected weight change. Negative for appetite change, chills, diaphoresis and fever.        10 lb weight gain over 6 months, says related to hormone changes.    HENT:   Negative for hearing loss, lump/mass, mouth sores, nosebleeds, sore throat, tinnitus, trouble swallowing and voice change.    Eyes: Negative for eye problems and  icterus.   Respiratory: Negative for chest tightness, cough, hemoptysis, shortness of breath and wheezing.    Cardiovascular: Negative for chest pain, leg swelling and palpitations.   Gastrointestinal: Positive for constipation and diarrhea. Negative for abdominal distention, abdominal pain, blood in stool, nausea, rectal pain and vomiting.        Chronic    Endocrine: Negative for hot flashes.   Genitourinary: Negative for bladder incontinence, difficulty urinating, dyspareunia, dysuria, frequency, hematuria, menstrual problem, nocturia, pelvic pain, vaginal bleeding and vaginal discharge.    Musculoskeletal: Negative for arthralgias, back pain, flank pain, gait problem, myalgias, neck pain and neck stiffness.   Skin: Negative for itching, rash and wound.   Neurological: Negative for dizziness, extremity weakness, gait problem, headaches, light-headedness, numbness, seizures and speech difficulty.   Hematological: Negative for adenopathy. Does not bruise/bleed easily.   Psychiatric/Behavioral: Negative for confusion, decreased concentration, depression, sleep disturbance and suicidal ideas. The patient is nervous/anxious.        Medications:    Current Outpatient Medications:   •  estradiol (CLIMARA) 0.05 MG/24HR patch, Place 1 patch on the skin as directed by provider 2 (Two) Times a Week., Disp: , Rfl:   •  hydrOXYzine (ATARAX) 25 MG tablet, Take 1 tablet by mouth 3 (Three) Times a Day As Needed for Itching or Anxiety., Disp: 60 tablet, Rfl: 11  •  lisinopril-hydrochlorothiazide (PRINZIDE,ZESTORETIC) 20-25 MG per tablet, Take 1 tablet by mouth Daily., Disp: 30 tablet, Rfl: 11  •  medroxyPROGESTERone (PROVERA) 2.5 MG tablet, Take 1 tablet by mouth Daily., Disp: , Rfl:   •  atorvastatin (LIPITOR) 10 MG tablet, Take 1 tablet by mouth Daily., Disp: 30 tablet, Rfl: 11  •  Diclofenac (ZORVOLEX) 35 MG capsule, Take 1 capsule by mouth 3 (Three) Times a Day As Needed (prn)., Disp: 90 capsule, Rfl: 2  •  fexofenadine  (ALLEGRA) 180 MG tablet, Take 180 mg by mouth daily., Disp: , Rfl:   •  ketotifen (ZADITOR) 0.025 % ophthalmic solution, Administer 1 drop to both eyes 2 (Two) Times a Day., Disp: 1 each, Rfl: 5  •  oxybutynin XL (DITROPAN-XL) 10 MG 24 hr tablet, Take 1 tablet by mouth Daily., Disp: 30 tablet, Rfl: 0      Allergies   Allergen Reactions   • Hydrocodone Itching   • Sulfa Antibiotics Itching       Past Medical History:   Diagnosis Date   • Anxiety    • Chest pain radiating to jaw    • GERD (gastroesophageal reflux disease)    • Heart palpitations    • Hyperlipidemia    • Hypertension    • Kidney stones    • Left arm pain    • Leg swelling        Past Surgical History:   Procedure Laterality Date   •  SECTION     • KIDNEY STONE SURGERY         Family History   Problem Relation Age of Onset   • Hypertension Mother    • Emphysema Father    • Lung cancer Father    • Bone cancer Father 62   • Lupus Sister    • Other Brother         kidney stone   • Heart disease Maternal Grandfather 55   • Diabetes Maternal Grandfather    • Pancreatic cancer Paternal Grandmother 68   • Other Paternal Grandfather         kidney stone   • Stroke Maternal Grandmother    • Breast cancer Paternal Aunt         late 60's   • Ovarian cancer Sister 34       Social History     Socioeconomic History   • Marital status:      Spouse name: Not on file   • Number of children: 4   • Years of education: Not on file   • Highest education level: Not on file   Social Needs   • Financial resource strain: Not on file   • Food insecurity - worry: Not on file   • Food insecurity - inability: Not on file   • Transportation needs - medical: Not on file   • Transportation needs - non-medical: Not on file   Occupational History   • Occupation: LPN     Employer: [employer]   Tobacco Use   • Smoking status: Never Smoker   • Smokeless tobacco: Never Used   Substance and Sexual Activity   • Alcohol use: Yes     Comment: occ   • Drug use: No   •  Sexual activity: Defer   Other Topics Concern   • Not on file   Social History Narrative   • Not on file     Patient drinks 1 servings of caffeine per day.       GYNECOLOGIC HISTORY:   . P: 4. AB: 1.  Last menstrual period: 2019, withdrawal bleed after stopping HRT  Age at menarche: 12  Age at first childbirth: 29  Lactation/How lon months  Age at menopause: perimenopausal  Total years of oral contraceptive use: 11 years  Total years of hormone replacement therapy: 3 mths, estradiol patch & progesterone pill (started 2018, stopped last week)      PHYSICAL EXAMINATION:   Vitals:    19 0855   BP: 125/81   Pulse: 88   Temp: 98.7 °F (37.1 °C)     ECOG 0 - Asymptomatic  General: NAD, well appearing  Psych: a&o x 3, normal mood and affect  Eyes: EOMI, no scleral icterus  ENMT: neck supple without masses or thyromegaly, mucus membranes moist  Resp: normal effort, CTAB  CV: RRR, no murmurs, no edema   GI: soft, NT, ND  MSK: normal gait, normal ROM in bilateral shoulders  Lymph nodes: no cervical, supraclavicular or axillary lymphadenopathy  Breast: symmetric, moderate size, grade 1 ptosis  Right: No visible abnormalities on inspection while seated, with arms raised or hands on hips. No masses, skin changes, or nipple abnormalities.  Left: On inspection there is upper outer quadrant ecchymoses and some mild superior nipple retraction. Expected postbiopsy changes in the UOQ. No masses.    Jaqueline Anthony MA was present for the entire examination.     I have independently reviewed her imaging and here are my findings:   In the left breast there are ~2 cm of calcifications at 12:00 and ~1 cm of calcifications at 1:00, corresponding to areas of abnormal enhancement on MRI.      Assessment:  53 y.o. F with a new diagnosis of multifocal left breast DCIS: intermediate grade, ER/WI+; stage 0, Tis.    Discussion:  I had an extensive discussion with the patient and her daughters about the nature of her DCIS  diagnosis and treatment options. We reviewed the components of breast tissue including ducts and lobules. We reviewed her pathology report in detail. We reviewed breast cancer histology, including stage, grade, receptors and how this applies to her diagnosis.     We discussed the fact that we cannot accurately predict which patients with DCIS will progress to invasive cancer. We also discussed the fact that we cannot rule out current invasive cancer and that if there is invasive cancer found on final pathology, this would change the treatment plan. We discussed lumpectomy (with radiation) vs. total mastectomy (with or without reconstruction). We discussed the risks and benefits of both approaches. We discussed that axillary staging is usually not indicated for DCIS, but sentinel lymph node biopsy is used when mastectomy is performed, given the possibility of finding an invasive component and the fact that we could not return for sentinel node biopsy after total mastectomy. We also discussed that if invasive cancer is found on final pathology after lumpectomy, she would need a second operation for sentinel lymph node biopsy.     Regarding radiation therapy, we discussed that radiation is indicated in all cases of breast conservation and in only limited circumstances following mastectomy. We discussed that the primary goal of adjuvant radiation is to decrease the likelihood of local recurrence. Regarding systemic therapy, we discussed that chemotherapy is not indicated in the treatment of DCIS. We briefly discussed the use of endocrine therapy to decrease the likelihood of a local recurrence or a second primary tumor, but will refer her to medical oncology to discuss this postoperatively.    In her case, she is not a candidate for breast conservation given the extent of disease and will require mastectomy. Since she has a mild left nipple deformity (she thinks this is new), I believe it would be safest to remove the  nipple-areola-complex with the mastectomy. Because we could not return for sentinel lymph node biopsy after mastectomy if invasive cancer is found on final pathology, she will also require sentinel lymph node biopsy. I will refer her to Dr. Ding to discuss options for reconstruction.    We discussed that most breast cancer is not hereditary, however given her family history, this may play a role in her case. Genetic testing is warranted and a stat panel was sent today. If this is positive for a pathogenic mutation, then we could consider a contralateral risk reduction mastectomy. If genetic testing is negative however, I explained that studies have shown that the risk of contralateral cancer is very low and that contralateral prophylactic mastectomy is not routinely recommended in the treatment of DCIS in the setting of negative genetic testing. I also explained that endocrine therapy would further reduce her risk of contralateral cancer. I would however recommend that she explore a contralateral symmetry procedure for cosmetic purposes with Dr. Ding.     I described the procedure for sentinel lymph node biopsy in detail, including the preoperative injection of technetium sulfur colloid and intraoperative injection of lymphazurin blue dye. I explained that this is a mapping test and not a cancer test, that all of the lymph nodes containing these dyes will be removed for complete testing by pathology, and that the results could impact the decision for adjuvant treatment or additional surgery. In her case, if sentinel nodes are positive for cancer intraoperatively, we will proceed with completion dissection. I explained that axillary node dissection is associated with an increased risk of lymphedema versus sentinel lymph node biopsy (15-30% vs. <10%), that there is an increased risk of permanent upper inner arm numbness, and a risk of injury to motor nerves that control the shoulder muscles.     I described  additional risks and potential complications associated with surgery, including, but not limited to, bleeding, infection, deformity, chronic pain, numbness, seroma, hematoma, deep venous thrombosis, skin flap necrosis, disease recurrence and the possibility of requiring additional surgery. We also discussed other treatment options including the option of not undergoing any surgical treatment and the risks associated with this including disease progression. She expressed an understanding of these factors and wished to proceed.    Plan:  A multidisciplinary plan has been formulated for the patient:      (1) Surgical Oncology:  -[facility] stat panel sent today. I will call her with results.   -Plastic surgery referral. Appt scheduled with Dr. Ding later this week.   -Will plan for left skin-sparing mastectomy & SLNB, possible axillary dissection with immediate reconstruction.     (2) Medical Oncology  -I have advised her not to resume her hormone replacement therapy.  -Will refer postoperatively.    (3) Radiation Oncology  -Will refer postoperatively if necessary.    Vonnie Cat MD    I have spent 80 min in face to face time with the patient and 70 min of this time was spent in counseling on the above stated issues.       CC:  MD Ashlee Bergeron, LANE Hernandez, LANE Ding MD

## 2019-02-05 ENCOUNTER — TELEPHONE (OUTPATIENT)
Dept: SURGERY | Facility: CLINIC | Age: 54
End: 2019-02-05

## 2019-02-05 NOTE — TELEPHONE ENCOUNTER
"Patient and El with [employer] called back, patients  is now fixed. WE will call tomorrow to get authorization (Adam requested \"1 day\" to have it populated in all [employer] systems)       Patients   is wrong with her insurance company. I spoke with her and she was aware of this issue. She is going to call her husbands employer (he is the subscriber) and follow up with them. I explained that we can not get her authorization, if one is needed, for her surgery until this issue is fixed. She is going to call us back after she speaks to HR.  TAVARES   "

## 2019-02-11 ENCOUNTER — TELEPHONE (OUTPATIENT)
Dept: OTHER | Facility: HOSPITAL | Age: 54
End: 2019-02-11

## 2019-02-11 NOTE — TELEPHONE ENCOUNTER
Referral received from Dr. Cat's office. Called Mason and left a message introducing myself and navigational services. Asked her to call me back at her convenience and left my contact information.

## 2019-02-11 NOTE — TELEPHONE ENCOUNTER
Mason returned my call and I introduced myself and navigational services. She states her consult with Dr. Cat went well and she understands her pathology and plan of care well. She is scheduled to have a Left Mastectomy with reconstruction on March 5th. She had a question about what happens if her genetic testing came back positive. I told her Dr. Cat would go over that with her and that [facility] does have genetic counselors available via phone to help if needed.      We discussed integrative therapies and other services at the [facility] including the opportunity to speak with our Oncology Dietitian or Oncology Social Worker. She verbalized interest in receiving a navigation folder outlining services. I verified her mailing address and will send out a navigation folder with the following information:     [facility] Cancer Support Network,  Sharing Our Stories Breast Cancer Support Group, Cancer and Restorative Exercise (CARE), [employer]  Exercise program, Together for Breast Cancer Survival,  For Women Facing Breast Cancer,   [employer],  [facility], Massage Therapy, Reiki Therapy, [employer] [place], Cancer Nutrition, Survivorship Clinic.     She may try and come by the [facility] on Feb 25th after her PAT appointment if she can. She verbalized appreciation for navigational services and she has my contact information and will call with any questions that arise.

## 2019-02-15 ENCOUNTER — TELEPHONE (OUTPATIENT)
Dept: SURGERY | Facility: CLINIC | Age: 54
End: 2019-02-15

## 2019-02-15 NOTE — TELEPHONE ENCOUNTER
Called patient and informed her of her Negative [facility] Genetic testing. Patient stated understanding. TAVARES

## 2019-02-25 ENCOUNTER — APPOINTMENT (OUTPATIENT)
Dept: PREADMISSION TESTING | Facility: HOSPITAL | Age: 54
End: 2019-02-25

## 2019-02-25 VITALS
TEMPERATURE: 98.1 F | HEART RATE: 74 BPM | RESPIRATION RATE: 20 BRPM | DIASTOLIC BLOOD PRESSURE: 85 MMHG | SYSTOLIC BLOOD PRESSURE: 129 MMHG | BODY MASS INDEX: 30.78 KG/M2 | HEIGHT: 59 IN | WEIGHT: 152.7 LBS | OXYGEN SATURATION: 99 %

## 2019-02-25 DIAGNOSIS — D05.12 DUCTAL CARCINOMA IN SITU (DCIS) OF LEFT BREAST: ICD-10-CM

## 2019-02-25 LAB
ANION GAP SERPL CALCULATED.3IONS-SCNC: 14.6 MMOL/L
BASOPHILS # BLD AUTO: 0.08 10*3/MM3 (ref 0–0.2)
BASOPHILS NFR BLD AUTO: 0.9 % (ref 0–1.5)
BUN BLD-MCNC: 17 MG/DL (ref 6–20)
BUN/CREAT SERPL: 20 (ref 7–25)
CALCIUM SPEC-SCNC: 10.3 MG/DL (ref 8.6–10.5)
CHLORIDE SERPL-SCNC: 101 MMOL/L (ref 98–107)
CO2 SERPL-SCNC: 26.4 MMOL/L (ref 22–29)
CREAT BLD-MCNC: 0.85 MG/DL (ref 0.57–1)
DEPRECATED RDW RBC AUTO: 37.6 FL (ref 37–54)
EOSINOPHIL # BLD AUTO: 0.23 10*3/MM3 (ref 0–0.4)
EOSINOPHIL NFR BLD AUTO: 2.6 % (ref 0.3–6.2)
ERYTHROCYTE [DISTWIDTH] IN BLOOD BY AUTOMATED COUNT: 11.7 % (ref 12.3–15.4)
GFR SERPL CREATININE-BSD FRML MDRD: 71 ML/MIN/1.73
GLUCOSE BLD-MCNC: 92 MG/DL (ref 65–99)
HCT VFR BLD AUTO: 45.9 % (ref 34–46.6)
HGB BLD-MCNC: 15.2 G/DL (ref 12–15.9)
IMM GRANULOCYTES # BLD AUTO: 0.03 10*3/MM3 (ref 0–0.05)
IMM GRANULOCYTES NFR BLD AUTO: 0.3 % (ref 0–0.5)
LYMPHOCYTES # BLD AUTO: 2.15 10*3/MM3 (ref 0.7–3.1)
LYMPHOCYTES NFR BLD AUTO: 24.1 % (ref 19.6–45.3)
MCH RBC QN AUTO: 29.1 PG (ref 26.6–33)
MCHC RBC AUTO-ENTMCNC: 33.1 G/DL (ref 31.5–35.7)
MCV RBC AUTO: 87.8 FL (ref 79–97)
MONOCYTES # BLD AUTO: 0.61 10*3/MM3 (ref 0.1–0.9)
MONOCYTES NFR BLD AUTO: 6.8 % (ref 5–12)
NEUTROPHILS # BLD AUTO: 5.82 10*3/MM3 (ref 1.4–7)
NEUTROPHILS NFR BLD AUTO: 65.3 % (ref 42.7–76)
NRBC BLD AUTO-RTO: 0 /100 WBC (ref 0–0)
PLATELET # BLD AUTO: 359 10*3/MM3 (ref 140–450)
PMV BLD AUTO: 9.6 FL (ref 6–12)
POTASSIUM BLD-SCNC: 3.9 MMOL/L (ref 3.5–5.2)
RBC # BLD AUTO: 5.23 10*6/MM3 (ref 3.77–5.28)
SODIUM BLD-SCNC: 142 MMOL/L (ref 136–145)
WBC NRBC COR # BLD: 8.92 10*3/MM3 (ref 3.4–10.8)

## 2019-02-25 PROCEDURE — 85025 COMPLETE CBC W/AUTO DIFF WBC: CPT | Performed by: SURGERY

## 2019-02-25 PROCEDURE — 80048 BASIC METABOLIC PNL TOTAL CA: CPT | Performed by: SURGERY

## 2019-02-25 PROCEDURE — 93010 ELECTROCARDIOGRAM REPORT: CPT | Performed by: INTERNAL MEDICINE

## 2019-02-25 PROCEDURE — 36415 COLL VENOUS BLD VENIPUNCTURE: CPT

## 2019-02-25 PROCEDURE — 93005 ELECTROCARDIOGRAM TRACING: CPT

## 2019-02-25 RX ORDER — OXYBUTYNIN CHLORIDE 10 MG/1
10 TABLET, EXTENDED RELEASE ORAL AS NEEDED
COMMUNITY
End: 2019-11-16

## 2019-02-25 RX ORDER — RANITIDINE 150 MG/1
150 TABLET ORAL NIGHTLY
COMMUNITY
End: 2019-11-16

## 2019-02-25 RX ORDER — ATORVASTATIN CALCIUM 10 MG/1
10 TABLET, FILM COATED ORAL NIGHTLY
COMMUNITY
End: 2019-11-16

## 2019-02-25 RX ORDER — LISINOPRIL AND HYDROCHLOROTHIAZIDE 25; 20 MG/1; MG/1
1 TABLET ORAL NIGHTLY
COMMUNITY
End: 2019-12-24

## 2019-02-25 NOTE — DISCHARGE INSTRUCTIONS
Take the following medications the morning of surgery with a small sip of water:  NONE    ARRIVAL TIME 0530 TO MAIN OR         General Instructions:  • Do not eat solid food after midnight the night before surgery.  • You may drink clear liquids day of surgery but must stop at least one hour before your hospital arrival time (CUTOFF TIME 0430 AM)  • It is beneficial for you to have a clear drink that contains carbohydrates the day of surgery.  We suggest a 12 to 20 ounce bottle of Gatorade or Powerade for non-diabetic patients or a 12 to 20 ounce bottle of G2 or Powerade Zero for diabetic patients. (Pediatric patients, are not advised to drink a 12 to 20 ounce carbohydrate drink)    Clear liquids are liquids you can see through.  Nothing red in color.     Plain water                               Sports drinks  Sodas                                   Gelatin (Jell-O)  Fruit juices without pulp such as white grape juice and apple juice  Popsicles that contain no fruit or yogurt  Tea or coffee (no cream or milk added)  Gatorade / Powerade  G2 / Powerade Zero    • Infants may have breast milk up to four hours before surgery.  • Infants drinking formula may drink formula up to six hours before surgery.   • Patients who avoid smoking, chewing tobacco and alcohol for 4 weeks prior to surgery have a reduced risk of post-operative complications.  Quit smoking as many days before surgery as you can.  • Do not smoke, use chewing tobacco or drink alcohol the day of surgery.   • If applicable bring your C-PAP/ BI-PAP machine.  • Bring any papers given to you in the doctor’s office.  • Wear clean comfortable clothes and socks.  • Do not wear contact lenses or make-up.  Bring a case for your glasses.   • Bring crutches or walker if applicable.  • Remove all piercings.  Leave jewelry and any other valuables at home.  • Hair extensions with metal clips must be removed prior to surgery.  • The Pre-Admission Testing nurse will  instruct you to bring medications if unable to obtain an accurate list in Pre-Admission Testing.            Preventing a Surgical Site Infection:  • For 2 to 3 days before surgery, avoid shaving with a razor because the razor can irritate skin and make it easier to develop an infection.    • Any areas of open skin can increase the risk of a post-operative wound infection by allowing bacteria to enter and travel throughout the body.  Notify your surgeon if you have any skin wounds / rashes even if it is not near the expected surgical site.  The area will need assessed to determine if surgery should be delayed until it is healed.  • The night prior to surgery sleep in a clean bed with clean clothing.  Do not allow pets to sleep with you.  • Shower on the morning of surgery using a fresh bar of anti-bacterial soap (such as Dial) and clean washcloth.  Dry with a clean towel and dress in clean clothing.  • Ask your surgeon if you will be receiving antibiotics prior to surgery.  • Make sure you, your family, and all healthcare providers clean their hands with soap and water or an alcohol based hand  before caring for you or your wound.    Day of surgery:  Upon arrival, a Pre-op nurse and Anesthesiologist will review your health history, obtain vital signs, and answer questions you may have.  The only belongings needed at this time will be your home medications and if applicable your C-PAP/BI-PAP machine.  If you are staying overnight your family can leave the rest of your belongings in the car and bring them to your room later.  A Pre-op nurse will start an IV and you may receive medication in preparation for surgery, including something to help you relax.  Your family will be able to see you in the Pre-op area.  While you are in surgery your family should notify the waiting room  if they leave the waiting room area and provide a contact phone number.    Please be aware that surgery does come with  discomfort.  We want to make every effort to control your discomfort so please discuss any uncontrolled symptoms with your nurse.   Your doctor will most likely have prescribed pain medications.      If you are going home after surgery you will receive individualized written care instructions before being discharged.  A responsible adult must drive you to and from the hospital on the day of your surgery and stay with you for 24 hours.    If you are staying overnight following surgery, you will be transported to your hospital room following the recovery period.  [facility] has all private rooms.    You have received a list of surgical assistants for your reference.  If you have any questions please call Pre-Admission Testing at [phone].  Deductibles and co-payments are collected on the day of service. Please be prepared to pay the required co-pay, deductible or deposit on the day of service as defined by your plan.

## 2019-03-05 ENCOUNTER — ANESTHESIA (OUTPATIENT)
Dept: PERIOP | Facility: HOSPITAL | Age: 54
End: 2019-03-05

## 2019-03-05 ENCOUNTER — HOSPITAL ENCOUNTER (INPATIENT)
Facility: HOSPITAL | Age: 54
LOS: 1 days | Discharge: HOME | End: 2019-03-06
Attending: SURGERY | Admitting: PLASTIC SURGERY

## 2019-03-05 ENCOUNTER — ANESTHESIA EVENT (OUTPATIENT)
Dept: PERIOP | Facility: HOSPITAL | Age: 54
End: 2019-03-05

## 2019-03-05 ENCOUNTER — HOSPITAL ENCOUNTER (OUTPATIENT)
Dept: NUCLEAR MEDICINE | Facility: HOSPITAL | Age: 54
Discharge: HOME | End: 2019-03-05
Attending: SURGERY

## 2019-03-05 DIAGNOSIS — D05.12 DUCTAL CARCINOMA IN SITU (DCIS) OF LEFT BREAST: ICD-10-CM

## 2019-03-05 LAB
B-HCG UR QL: NEGATIVE
INTERNAL NEGATIVE CONTROL: NEGATIVE
INTERNAL POSITIVE CONTROL: POSITIVE
Lab: NORMAL

## 2019-03-05 PROCEDURE — 38525 BIOPSY/REMOVAL LYMPH NODES: CPT | Performed by: SURGERY

## 2019-03-05 PROCEDURE — 25010000002 ROPIVACAINE PER 1 MG: Performed by: PLASTIC SURGERY

## 2019-03-05 PROCEDURE — 78195 LYMPH SYSTEM IMAGING: CPT | Performed by: SURGERY

## 2019-03-05 PROCEDURE — C1789 PROSTHESIS, BREAST, IMP: HCPCS | Performed by: SURGERY

## 2019-03-05 PROCEDURE — 25010000002 HYDROMORPHONE PER 4 MG: Performed by: PLASTIC SURGERY

## 2019-03-05 PROCEDURE — 25010000002 ALBUMIN HUMAN 5% PER 50 ML: Performed by: ANESTHESIOLOGY

## 2019-03-05 PROCEDURE — G0378 HOSPITAL OBSERVATION PER HR: HCPCS

## 2019-03-05 PROCEDURE — 25010000002 PROPOFOL 10 MG/ML EMULSION: Performed by: ANESTHESIOLOGY

## 2019-03-05 PROCEDURE — 25010000002 ONDANSETRON PER 1 MG: Performed by: ANESTHESIOLOGY

## 2019-03-05 PROCEDURE — A9541 TC99M SULFUR COLLOID: HCPCS | Performed by: SURGERY

## 2019-03-05 PROCEDURE — P9041 ALBUMIN (HUMAN),5%, 50ML: HCPCS | Performed by: ANESTHESIOLOGY

## 2019-03-05 PROCEDURE — 94799 UNLISTED PULMONARY SVC/PX: CPT

## 2019-03-05 PROCEDURE — 19303 MAST SIMPLE COMPLETE: CPT | Performed by: SURGERY

## 2019-03-05 PROCEDURE — 88331 PATH CONSLTJ SURG 1 BLK 1SPC: CPT | Performed by: SURGERY

## 2019-03-05 PROCEDURE — 0 TECHNETIUM FILTERED SULFUR COLLOID: Performed by: SURGERY

## 2019-03-05 PROCEDURE — 0HTU0ZZ RESECTION OF LEFT BREAST, OPEN APPROACH: ICD-10-PCS | Performed by: SURGERY

## 2019-03-05 PROCEDURE — 25010000002 HEPARIN (PORCINE) PER 1000 UNITS: Performed by: SURGERY

## 2019-03-05 PROCEDURE — 25010000002 NEOSTIGMINE PER 0.5 MG: Performed by: ANESTHESIOLOGY

## 2019-03-05 PROCEDURE — 81025 URINE PREGNANCY TEST: CPT | Performed by: ANESTHESIOLOGY

## 2019-03-05 PROCEDURE — 25010000002 HYDROMORPHONE PER 4 MG: Performed by: ANESTHESIOLOGY

## 2019-03-05 PROCEDURE — 25010000002 DEXAMETHASONE PER 1 MG: Performed by: ANESTHESIOLOGY

## 2019-03-05 PROCEDURE — 25010000002 MIDAZOLAM PER 1 MG: Performed by: ANESTHESIOLOGY

## 2019-03-05 PROCEDURE — 25010000002 GENTAMICIN PER 80 MG: Performed by: PLASTIC SURGERY

## 2019-03-05 PROCEDURE — 25010000002 FENTANYL CITRATE (PF) 100 MCG/2ML SOLUTION: Performed by: ANESTHESIOLOGY

## 2019-03-05 PROCEDURE — 25010000003 CEFAZOLIN PER 500 MG: Performed by: PLASTIC SURGERY

## 2019-03-05 PROCEDURE — 88307 TISSUE EXAM BY PATHOLOGIST: CPT | Performed by: SURGERY

## 2019-03-05 PROCEDURE — 38792 RA TRACER ID OF SENTINL NODE: CPT

## 2019-03-05 PROCEDURE — 38900 IO MAP OF SENT LYMPH NODE: CPT | Performed by: SURGERY

## 2019-03-05 PROCEDURE — 25010000002 PHENYLEPHRINE PER 1 ML: Performed by: ANESTHESIOLOGY

## 2019-03-05 PROCEDURE — 07B60ZZ EXCISION OF LEFT AXILLARY LYMPHATIC, OPEN APPROACH: ICD-10-PCS | Performed by: SURGERY

## 2019-03-05 PROCEDURE — 0HHU0NZ INSERTION OF TISSUE EXPANDER INTO LEFT BREAST, OPEN APPROACH: ICD-10-PCS | Performed by: PLASTIC SURGERY

## 2019-03-05 PROCEDURE — 88341 IMHCHEM/IMCYTCHM EA ADD ANTB: CPT | Performed by: SURGERY

## 2019-03-05 DEVICE — IMPLANTABLE DEVICE: Type: IMPLANTABLE DEVICE | Site: BREAST | Status: FUNCTIONAL

## 2019-03-05 DEVICE — IMPLANTABLE DEVICE
Type: IMPLANTABLE DEVICE | Site: BREAST | Status: NON-FUNCTIONAL
Removed: 2019-06-11

## 2019-03-05 RX ORDER — DIPHENHYDRAMINE HYDROCHLORIDE 50 MG/ML
12.5 INJECTION INTRAMUSCULAR; INTRAVENOUS
Status: DISCONTINUED | OUTPATIENT
Start: 2019-03-05 | End: 2019-03-05 | Stop reason: HOSPADM

## 2019-03-05 RX ORDER — GLYCOPYRROLATE 0.2 MG/ML
INJECTION INTRAMUSCULAR; INTRAVENOUS AS NEEDED
Status: DISCONTINUED | OUTPATIENT
Start: 2019-03-05 | End: 2019-03-05 | Stop reason: SURG

## 2019-03-05 RX ORDER — FENTANYL CITRATE 50 UG/ML
50 INJECTION, SOLUTION INTRAMUSCULAR; INTRAVENOUS
Status: DISCONTINUED | OUTPATIENT
Start: 2019-03-05 | End: 2019-03-05 | Stop reason: HOSPADM

## 2019-03-05 RX ORDER — HYDROMORPHONE HYDROCHLORIDE 1 MG/ML
0.5 INJECTION, SOLUTION INTRAMUSCULAR; INTRAVENOUS; SUBCUTANEOUS
Status: DISCONTINUED | OUTPATIENT
Start: 2019-03-05 | End: 2019-03-05 | Stop reason: HOSPADM

## 2019-03-05 RX ORDER — OXYCODONE HYDROCHLORIDE 5 MG/1
5 TABLET ORAL EVERY 4 HOURS PRN
Status: DISCONTINUED | OUTPATIENT
Start: 2019-03-05 | End: 2019-03-06 | Stop reason: HOSPADM

## 2019-03-05 RX ORDER — LIDOCAINE HYDROCHLORIDE 20 MG/ML
INJECTION, SOLUTION INFILTRATION; PERINEURAL AS NEEDED
Status: DISCONTINUED | OUTPATIENT
Start: 2019-03-05 | End: 2019-03-05 | Stop reason: SURG

## 2019-03-05 RX ORDER — HEPARIN SODIUM 5000 [USP'U]/ML
5000 INJECTION, SOLUTION INTRAVENOUS; SUBCUTANEOUS
Status: COMPLETED | OUTPATIENT
Start: 2019-03-05 | End: 2019-03-05

## 2019-03-05 RX ORDER — FENTANYL CITRATE 50 UG/ML
INJECTION, SOLUTION INTRAMUSCULAR; INTRAVENOUS AS NEEDED
Status: DISCONTINUED | OUTPATIENT
Start: 2019-03-05 | End: 2019-03-05 | Stop reason: SURG

## 2019-03-05 RX ORDER — MAGNESIUM HYDROXIDE 1200 MG/15ML
LIQUID ORAL AS NEEDED
Status: DISCONTINUED | OUTPATIENT
Start: 2019-03-05 | End: 2019-03-05 | Stop reason: HOSPADM

## 2019-03-05 RX ORDER — ONDANSETRON 2 MG/ML
INJECTION INTRAMUSCULAR; INTRAVENOUS AS NEEDED
Status: DISCONTINUED | OUTPATIENT
Start: 2019-03-05 | End: 2019-03-05 | Stop reason: SURG

## 2019-03-05 RX ORDER — SODIUM CHLORIDE, SODIUM LACTATE, POTASSIUM CHLORIDE, CALCIUM CHLORIDE 600; 310; 30; 20 MG/100ML; MG/100ML; MG/100ML; MG/100ML
125 INJECTION, SOLUTION INTRAVENOUS CONTINUOUS
Status: DISCONTINUED | OUTPATIENT
Start: 2019-03-05 | End: 2019-03-06 | Stop reason: HOSPADM

## 2019-03-05 RX ORDER — SODIUM CHLORIDE 0.9 % (FLUSH) 0.9 %
3-10 SYRINGE (ML) INJECTION AS NEEDED
Status: DISCONTINUED | OUTPATIENT
Start: 2019-03-05 | End: 2019-03-06 | Stop reason: HOSPADM

## 2019-03-05 RX ORDER — MIDAZOLAM HYDROCHLORIDE 1 MG/ML
1 INJECTION INTRAMUSCULAR; INTRAVENOUS
Status: DISCONTINUED | OUTPATIENT
Start: 2019-03-05 | End: 2019-03-05 | Stop reason: HOSPADM

## 2019-03-05 RX ORDER — ONDANSETRON 4 MG/1
4 TABLET, ORALLY DISINTEGRATING ORAL EVERY 6 HOURS PRN
Status: DISCONTINUED | OUTPATIENT
Start: 2019-03-05 | End: 2019-03-06 | Stop reason: HOSPADM

## 2019-03-05 RX ORDER — LABETALOL HYDROCHLORIDE 5 MG/ML
5 INJECTION, SOLUTION INTRAVENOUS
Status: DISCONTINUED | OUTPATIENT
Start: 2019-03-05 | End: 2019-03-05 | Stop reason: HOSPADM

## 2019-03-05 RX ORDER — FLUMAZENIL 0.1 MG/ML
0.2 INJECTION INTRAVENOUS AS NEEDED
Status: DISCONTINUED | OUTPATIENT
Start: 2019-03-05 | End: 2019-03-05 | Stop reason: HOSPADM

## 2019-03-05 RX ORDER — HYDROCODONE BITARTRATE AND ACETAMINOPHEN 7.5; 325 MG/1; MG/1
1 TABLET ORAL ONCE AS NEEDED
Status: DISCONTINUED | OUTPATIENT
Start: 2019-03-05 | End: 2019-03-05

## 2019-03-05 RX ORDER — PROMETHAZINE HYDROCHLORIDE 25 MG/ML
12.5 INJECTION, SOLUTION INTRAMUSCULAR; INTRAVENOUS ONCE AS NEEDED
Status: DISCONTINUED | OUTPATIENT
Start: 2019-03-05 | End: 2019-03-05 | Stop reason: HOSPADM

## 2019-03-05 RX ORDER — FAMOTIDINE 20 MG/1
20 TABLET, FILM COATED ORAL NIGHTLY
Status: DISCONTINUED | OUTPATIENT
Start: 2019-03-05 | End: 2019-03-06 | Stop reason: HOSPADM

## 2019-03-05 RX ORDER — ACETAMINOPHEN 500 MG
1000 TABLET ORAL ONCE
Status: COMPLETED | OUTPATIENT
Start: 2019-03-05 | End: 2019-03-05

## 2019-03-05 RX ORDER — ACETAMINOPHEN 325 MG/1
650 TABLET ORAL EVERY 4 HOURS PRN
Status: DISCONTINUED | OUTPATIENT
Start: 2019-03-05 | End: 2019-03-06 | Stop reason: HOSPADM

## 2019-03-05 RX ORDER — SODIUM CHLORIDE 0.9 % (FLUSH) 0.9 %
3 SYRINGE (ML) INJECTION EVERY 12 HOURS SCHEDULED
Status: DISCONTINUED | OUTPATIENT
Start: 2019-03-05 | End: 2019-03-06 | Stop reason: HOSPADM

## 2019-03-05 RX ORDER — GABAPENTIN 300 MG/1
300 CAPSULE ORAL ONCE
Status: COMPLETED | OUTPATIENT
Start: 2019-03-05 | End: 2019-03-05

## 2019-03-05 RX ORDER — HYDROMORPHONE HCL 110MG/55ML
PATIENT CONTROLLED ANALGESIA SYRINGE INTRAVENOUS AS NEEDED
Status: DISCONTINUED | OUTPATIENT
Start: 2019-03-05 | End: 2019-03-05 | Stop reason: SURG

## 2019-03-05 RX ORDER — OXYCODONE AND ACETAMINOPHEN 7.5; 325 MG/1; MG/1
1 TABLET ORAL ONCE AS NEEDED
Status: DISCONTINUED | OUTPATIENT
Start: 2019-03-05 | End: 2019-03-05 | Stop reason: HOSPADM

## 2019-03-05 RX ORDER — DIAZEPAM 5 MG/1
10 TABLET ORAL ONCE AS NEEDED
Status: DISCONTINUED | OUTPATIENT
Start: 2019-03-05 | End: 2019-03-05 | Stop reason: HOSPADM

## 2019-03-05 RX ORDER — HYDRALAZINE HYDROCHLORIDE 20 MG/ML
5 INJECTION INTRAMUSCULAR; INTRAVENOUS
Status: DISCONTINUED | OUTPATIENT
Start: 2019-03-05 | End: 2019-03-05 | Stop reason: HOSPADM

## 2019-03-05 RX ORDER — DIAZEPAM 5 MG/1
TABLET ORAL
Status: COMPLETED
Start: 2019-03-05 | End: 2019-03-05

## 2019-03-05 RX ORDER — DIAZEPAM 5 MG/1
10 TABLET ORAL ONCE
Status: COMPLETED | OUTPATIENT
Start: 2019-03-05 | End: 2019-03-05

## 2019-03-05 RX ORDER — ROCURONIUM BROMIDE 10 MG/ML
INJECTION, SOLUTION INTRAVENOUS AS NEEDED
Status: DISCONTINUED | OUTPATIENT
Start: 2019-03-05 | End: 2019-03-05 | Stop reason: SURG

## 2019-03-05 RX ORDER — DEXAMETHASONE SODIUM PHOSPHATE 10 MG/ML
INJECTION INTRAMUSCULAR; INTRAVENOUS AS NEEDED
Status: DISCONTINUED | OUTPATIENT
Start: 2019-03-05 | End: 2019-03-05 | Stop reason: SURG

## 2019-03-05 RX ORDER — ACETAMINOPHEN 325 MG/1
650 TABLET ORAL ONCE AS NEEDED
Status: DISCONTINUED | OUTPATIENT
Start: 2019-03-05 | End: 2019-03-05 | Stop reason: HOSPADM

## 2019-03-05 RX ORDER — LISINOPRIL 20 MG/1
20 TABLET ORAL DAILY
Status: DISCONTINUED | OUTPATIENT
Start: 2019-03-05 | End: 2019-03-06 | Stop reason: HOSPADM

## 2019-03-05 RX ORDER — GABAPENTIN 100 MG/1
100 CAPSULE ORAL EVERY 8 HOURS SCHEDULED
Status: DISCONTINUED | OUTPATIENT
Start: 2019-03-05 | End: 2019-03-06 | Stop reason: HOSPADM

## 2019-03-05 RX ORDER — ROPIVACAINE HYDROCHLORIDE 5 MG/ML
INJECTION, SOLUTION EPIDURAL; INFILTRATION; PERINEURAL AS NEEDED
Status: DISCONTINUED | OUTPATIENT
Start: 2019-03-05 | End: 2019-03-05 | Stop reason: HOSPADM

## 2019-03-05 RX ORDER — EPHEDRINE SULFATE 50 MG/ML
5 INJECTION, SOLUTION INTRAVENOUS ONCE AS NEEDED
Status: DISCONTINUED | OUTPATIENT
Start: 2019-03-05 | End: 2019-03-05 | Stop reason: HOSPADM

## 2019-03-05 RX ORDER — NALOXONE HCL 0.4 MG/ML
0.1 VIAL (ML) INJECTION
Status: DISCONTINUED | OUTPATIENT
Start: 2019-03-05 | End: 2019-03-05

## 2019-03-05 RX ORDER — PROPOFOL 10 MG/ML
VIAL (ML) INTRAVENOUS AS NEEDED
Status: DISCONTINUED | OUTPATIENT
Start: 2019-03-05 | End: 2019-03-05 | Stop reason: SURG

## 2019-03-05 RX ORDER — NAPROXEN SODIUM 220 MG
220 TABLET ORAL 2 TIMES DAILY PRN
COMMUNITY
End: 2019-03-06 | Stop reason: HOSPADM

## 2019-03-05 RX ORDER — KETOTIFEN FUMARATE 0.35 MG/ML
1 SOLUTION/ DROPS OPHTHALMIC 2 TIMES DAILY
Status: DISCONTINUED | OUTPATIENT
Start: 2019-03-05 | End: 2019-03-06 | Stop reason: HOSPADM

## 2019-03-05 RX ORDER — ONDANSETRON 4 MG/1
4 TABLET, FILM COATED ORAL EVERY 6 HOURS PRN
Status: DISCONTINUED | OUTPATIENT
Start: 2019-03-05 | End: 2019-03-06 | Stop reason: HOSPADM

## 2019-03-05 RX ORDER — DOCUSATE SODIUM 100 MG/1
100 CAPSULE, LIQUID FILLED ORAL 2 TIMES DAILY PRN
Status: DISCONTINUED | OUTPATIENT
Start: 2019-03-05 | End: 2019-03-06 | Stop reason: HOSPADM

## 2019-03-05 RX ORDER — HYDROMORPHONE HYDROCHLORIDE 1 MG/ML
0.5 INJECTION, SOLUTION INTRAMUSCULAR; INTRAVENOUS; SUBCUTANEOUS
Status: DISCONTINUED | OUTPATIENT
Start: 2019-03-05 | End: 2019-03-06 | Stop reason: HOSPADM

## 2019-03-05 RX ORDER — ONDANSETRON HYDROCHLORIDE 8 MG/1
8 TABLET, FILM COATED ORAL ONCE
Status: COMPLETED | OUTPATIENT
Start: 2019-03-05 | End: 2019-03-05

## 2019-03-05 RX ORDER — NALOXONE HCL 0.4 MG/ML
0.1 VIAL (ML) INJECTION
Status: DISCONTINUED | OUTPATIENT
Start: 2019-03-05 | End: 2019-03-06 | Stop reason: HOSPADM

## 2019-03-05 RX ORDER — ATORVASTATIN CALCIUM 10 MG/1
10 TABLET, FILM COATED ORAL NIGHTLY
Status: DISCONTINUED | OUTPATIENT
Start: 2019-03-05 | End: 2019-03-06 | Stop reason: HOSPADM

## 2019-03-05 RX ORDER — MIDAZOLAM HYDROCHLORIDE 1 MG/ML
2 INJECTION INTRAMUSCULAR; INTRAVENOUS
Status: DISCONTINUED | OUTPATIENT
Start: 2019-03-05 | End: 2019-03-05 | Stop reason: HOSPADM

## 2019-03-05 RX ORDER — NALOXONE HCL 0.4 MG/ML
0.2 VIAL (ML) INJECTION AS NEEDED
Status: DISCONTINUED | OUTPATIENT
Start: 2019-03-05 | End: 2019-03-05 | Stop reason: HOSPADM

## 2019-03-05 RX ORDER — BISACODYL 10 MG
10 SUPPOSITORY, RECTAL RECTAL DAILY PRN
Status: DISCONTINUED | OUTPATIENT
Start: 2019-03-05 | End: 2019-03-06 | Stop reason: HOSPADM

## 2019-03-05 RX ORDER — ONDANSETRON 2 MG/ML
4 INJECTION INTRAMUSCULAR; INTRAVENOUS ONCE AS NEEDED
Status: DISCONTINUED | OUTPATIENT
Start: 2019-03-05 | End: 2019-03-05 | Stop reason: HOSPADM

## 2019-03-05 RX ORDER — ONDANSETRON 2 MG/ML
4 INJECTION INTRAMUSCULAR; INTRAVENOUS EVERY 6 HOURS PRN
Status: DISCONTINUED | OUTPATIENT
Start: 2019-03-05 | End: 2019-03-06 | Stop reason: HOSPADM

## 2019-03-05 RX ORDER — CELECOXIB 200 MG/1
400 CAPSULE ORAL ONCE
Status: DISCONTINUED | OUTPATIENT
Start: 2019-03-05 | End: 2019-03-05

## 2019-03-05 RX ORDER — HYDROXYZINE HYDROCHLORIDE 25 MG/1
25 TABLET, FILM COATED ORAL 3 TIMES DAILY PRN
Status: DISCONTINUED | OUTPATIENT
Start: 2019-03-05 | End: 2019-03-06 | Stop reason: HOSPADM

## 2019-03-05 RX ORDER — ALBUMIN, HUMAN INJ 5% 5 %
SOLUTION INTRAVENOUS CONTINUOUS PRN
Status: DISCONTINUED | OUTPATIENT
Start: 2019-03-05 | End: 2019-03-05 | Stop reason: SURG

## 2019-03-05 RX ORDER — DIPHENHYDRAMINE HCL 25 MG
25 CAPSULE ORAL
Status: DISCONTINUED | OUTPATIENT
Start: 2019-03-05 | End: 2019-03-05 | Stop reason: HOSPADM

## 2019-03-05 RX ORDER — OXYBUTYNIN CHLORIDE 10 MG/1
10 TABLET, EXTENDED RELEASE ORAL DAILY PRN
Status: DISCONTINUED | OUTPATIENT
Start: 2019-03-05 | End: 2019-03-06 | Stop reason: HOSPADM

## 2019-03-05 RX ORDER — HYDROMORPHONE HYDROCHLORIDE 1 MG/ML
0.25 INJECTION, SOLUTION INTRAMUSCULAR; INTRAVENOUS; SUBCUTANEOUS
Status: DISCONTINUED | OUTPATIENT
Start: 2019-03-05 | End: 2019-03-05

## 2019-03-05 RX ORDER — FAMOTIDINE 10 MG/ML
20 INJECTION, SOLUTION INTRAVENOUS ONCE
Status: COMPLETED | OUTPATIENT
Start: 2019-03-05 | End: 2019-03-05

## 2019-03-05 RX ORDER — LIDOCAINE HYDROCHLORIDE 10 MG/ML
0.5 INJECTION, SOLUTION EPIDURAL; INFILTRATION; INTRACAUDAL; PERINEURAL ONCE AS NEEDED
Status: DISCONTINUED | OUTPATIENT
Start: 2019-03-05 | End: 2019-03-05 | Stop reason: HOSPADM

## 2019-03-05 RX ORDER — KETAMINE HYDROCHLORIDE 10 MG/ML
INJECTION INTRAMUSCULAR; INTRAVENOUS AS NEEDED
Status: DISCONTINUED | OUTPATIENT
Start: 2019-03-05 | End: 2019-03-05 | Stop reason: SURG

## 2019-03-05 RX ORDER — DIPHENHYDRAMINE HCL 25 MG
25 CAPSULE ORAL EVERY 6 HOURS PRN
Status: DISCONTINUED | OUTPATIENT
Start: 2019-03-05 | End: 2019-03-06 | Stop reason: HOSPADM

## 2019-03-05 RX ORDER — OXYCODONE HYDROCHLORIDE 5 MG/1
10 TABLET ORAL ONCE
Status: COMPLETED | OUTPATIENT
Start: 2019-03-05 | End: 2019-03-05

## 2019-03-05 RX ORDER — SODIUM CHLORIDE, SODIUM LACTATE, POTASSIUM CHLORIDE, CALCIUM CHLORIDE 600; 310; 30; 20 MG/100ML; MG/100ML; MG/100ML; MG/100ML
9 INJECTION, SOLUTION INTRAVENOUS CONTINUOUS
Status: DISCONTINUED | OUTPATIENT
Start: 2019-03-05 | End: 2019-03-06 | Stop reason: HOSPADM

## 2019-03-05 RX ORDER — SODIUM CHLORIDE 0.9 % (FLUSH) 0.9 %
1-10 SYRINGE (ML) INJECTION AS NEEDED
Status: DISCONTINUED | OUTPATIENT
Start: 2019-03-05 | End: 2019-03-05 | Stop reason: HOSPADM

## 2019-03-05 RX ORDER — HYDROCHLOROTHIAZIDE 25 MG/1
25 TABLET ORAL DAILY
Status: DISCONTINUED | OUTPATIENT
Start: 2019-03-05 | End: 2019-03-06 | Stop reason: HOSPADM

## 2019-03-05 RX ORDER — PROMETHAZINE HYDROCHLORIDE 25 MG/1
25 TABLET ORAL ONCE AS NEEDED
Status: DISCONTINUED | OUTPATIENT
Start: 2019-03-05 | End: 2019-03-05 | Stop reason: HOSPADM

## 2019-03-05 RX ORDER — PROMETHAZINE HYDROCHLORIDE 25 MG/1
25 SUPPOSITORY RECTAL ONCE AS NEEDED
Status: DISCONTINUED | OUTPATIENT
Start: 2019-03-05 | End: 2019-03-05 | Stop reason: HOSPADM

## 2019-03-05 RX ADMIN — DOXYCYCLINE 200 MG: 100 INJECTION, POWDER, LYOPHILIZED, FOR SOLUTION INTRAVENOUS at 08:15

## 2019-03-05 RX ADMIN — PHENYLEPHRINE HYDROCHLORIDE 100 MCG: 10 INJECTION INTRAVENOUS at 09:50

## 2019-03-05 RX ADMIN — HYDROMORPHONE HYDROCHLORIDE 0.25 MG: 1 INJECTION, SOLUTION INTRAMUSCULAR; INTRAVENOUS; SUBCUTANEOUS at 14:47

## 2019-03-05 RX ADMIN — OXYCODONE 10 MG: 5 TABLET ORAL at 07:25

## 2019-03-05 RX ADMIN — FENTANYL CITRATE 100 MCG: 50 INJECTION INTRAMUSCULAR; INTRAVENOUS at 08:00

## 2019-03-05 RX ADMIN — ROCURONIUM BROMIDE 50 MG: 10 INJECTION INTRAVENOUS at 08:07

## 2019-03-05 RX ADMIN — KETAMINE HYDROCHLORIDE 25 MG: 10 INJECTION INTRAMUSCULAR; INTRAVENOUS at 08:28

## 2019-03-05 RX ADMIN — ALBUMIN (HUMAN): 0.05 SOLUTION INTRAVENOUS at 08:23

## 2019-03-05 RX ADMIN — PROPOFOL 150 MG: 10 INJECTION, EMULSION INTRAVENOUS at 08:06

## 2019-03-05 RX ADMIN — GLYCOPYRROLATE 0.2 MG: 0.2 INJECTION INTRAMUSCULAR; INTRAVENOUS at 08:05

## 2019-03-05 RX ADMIN — ATORVASTATIN CALCIUM 10 MG: 10 TABLET, FILM COATED ORAL at 20:15

## 2019-03-05 RX ADMIN — HYDROMORPHONE HYDROCHLORIDE 0.5 MG: 2 INJECTION INTRAMUSCULAR; INTRAVENOUS; SUBCUTANEOUS at 11:25

## 2019-03-05 RX ADMIN — TECHNETIUM TC 99M SULFUR COLLOID 1 DOSE: KIT at 07:05

## 2019-03-05 RX ADMIN — HYDROMORPHONE HYDROCHLORIDE 0.5 MG: 2 INJECTION INTRAMUSCULAR; INTRAVENOUS; SUBCUTANEOUS at 11:17

## 2019-03-05 RX ADMIN — FENTANYL CITRATE 50 MCG: 50 INJECTION, SOLUTION INTRAMUSCULAR; INTRAVENOUS at 12:45

## 2019-03-05 RX ADMIN — MIDAZOLAM 2 MG: 1 INJECTION INTRAMUSCULAR; INTRAVENOUS at 07:37

## 2019-03-05 RX ADMIN — HEPARIN SODIUM 5000 UNITS: 5000 INJECTION INTRAVENOUS; SUBCUTANEOUS at 07:38

## 2019-03-05 RX ADMIN — KETAMINE HYDROCHLORIDE 25 MG: 10 INJECTION INTRAMUSCULAR; INTRAVENOUS at 08:06

## 2019-03-05 RX ADMIN — OXYCODONE 5 MG: 5 TABLET ORAL at 16:59

## 2019-03-05 RX ADMIN — DEXAMETHASONE SODIUM PHOSPHATE 8 MG: 10 INJECTION INTRAMUSCULAR; INTRAVENOUS at 08:34

## 2019-03-05 RX ADMIN — SODIUM CHLORIDE, PRESERVATIVE FREE 3 ML: 5 INJECTION INTRAVENOUS at 20:15

## 2019-03-05 RX ADMIN — OXYCODONE 5 MG: 5 TABLET ORAL at 21:03

## 2019-03-05 RX ADMIN — DIAZEPAM 10 MG: 5 TABLET ORAL at 06:50

## 2019-03-05 RX ADMIN — DOXYCYCLINE 100 MG: 100 INJECTION, POWDER, LYOPHILIZED, FOR SOLUTION INTRAVENOUS at 20:14

## 2019-03-05 RX ADMIN — FAMOTIDINE 20 MG: 20 TABLET, FILM COATED ORAL at 20:15

## 2019-03-05 RX ADMIN — GLYCOPYRROLATE 0.4 MG: 0.2 INJECTION INTRAMUSCULAR; INTRAVENOUS at 11:04

## 2019-03-05 RX ADMIN — FAMOTIDINE 20 MG: 10 INJECTION INTRAVENOUS at 07:37

## 2019-03-05 RX ADMIN — SODIUM CHLORIDE, POTASSIUM CHLORIDE, SODIUM LACTATE AND CALCIUM CHLORIDE 125 ML/HR: 600; 310; 30; 20 INJECTION, SOLUTION INTRAVENOUS at 06:36

## 2019-03-05 RX ADMIN — LIDOCAINE HYDROCHLORIDE 80 MG: 20 INJECTION, SOLUTION INFILTRATION; PERINEURAL at 08:06

## 2019-03-05 RX ADMIN — ACETAMINOPHEN 1000 MG: 500 TABLET, FILM COATED ORAL at 06:45

## 2019-03-05 RX ADMIN — GABAPENTIN 100 MG: 100 CAPSULE ORAL at 16:08

## 2019-03-05 RX ADMIN — NEOSTIGMINE METHYLSULFATE 3 MG: 1 INJECTION INTRAMUSCULAR; INTRAVENOUS; SUBCUTANEOUS at 11:04

## 2019-03-05 RX ADMIN — KETOTIFEN FUMARATE 1 DROP: 0.35 SOLUTION/ DROPS OPHTHALMIC at 20:15

## 2019-03-05 RX ADMIN — ONDANSETRON 8 MG: 8 TABLET, FILM COATED ORAL at 06:45

## 2019-03-05 RX ADMIN — SODIUM CHLORIDE, POTASSIUM CHLORIDE, SODIUM LACTATE AND CALCIUM CHLORIDE: 600; 310; 30; 20 INJECTION, SOLUTION INTRAVENOUS at 10:00

## 2019-03-05 RX ADMIN — ONDANSETRON 4 MG: 2 INJECTION INTRAMUSCULAR; INTRAVENOUS at 11:04

## 2019-03-05 RX ADMIN — GABAPENTIN 300 MG: 300 CAPSULE ORAL at 07:26

## 2019-03-05 NOTE — ANESTHESIA POSTPROCEDURE EVALUATION
"Patient: Mason Parada    Procedure Summary     Date:  03/05/19 Room / Location:  [facility] OR 04 / [facility] MAIN OR    Anesthesia Start:  0800 Anesthesia Stop:  1147    Procedures:       LEFT SKIN-SPARING MASTECTOMY WITH SENTINEL LYMPH NODE BIOPSY (Left Breast)      left prepectorasl placement of tissue expander with alloderm (Left Breast) Diagnosis:       Ductal carcinoma in situ (DCIS) of left breast      (Ductal carcinoma in situ (DCIS) of left breast [D05.12])    Surgeon:  Vonnie Cat MD; Abdirashid Ding MD Provider:  Ty Sharma MD    Anesthesia Type:  general ASA Status:  3          Anesthesia Type: general  Last vitals  BP   115/74 (03/05/19 1248)   Temp   37.1 °C (98.7 °F) (03/05/19 1248)   Pulse   101 (03/05/19 1248)   Resp   18 (03/05/19 1248)     SpO2   97 % (03/05/19 1248)     Post Anesthesia Care and Evaluation    Patient location during evaluation: PACU  Patient participation: complete - patient participated  Level of consciousness: awake and alert  Pain management: adequate  Airway patency: patent  Anesthetic complications: No anesthetic complications    Cardiovascular status: acceptable  Respiratory status: acceptable  Hydration status: acceptable    Comments: /74   Pulse 101   Temp 37.1 °C (98.7 °F) (Oral)   Resp 18   Ht 149.9 cm (59\")   Wt 69.9 kg (154 lb)   SpO2 97%   BMI 31.10 kg/m²               "

## 2019-03-05 NOTE — ANESTHESIA PROCEDURE NOTES
Airway  Urgency: elective    Airway not difficult    General Information and Staff    Patient location during procedure: OR  Anesthesiologist: Ty Sharma MD    Indications and Patient Condition  Indications for airway management: airway protection    Preoxygenated: yes  Mask difficulty assessment: 1 - vent by mask    Final Airway Details  Final airway type: endotracheal airway      Successful airway: ETT  Cuffed: yes   Successful intubation technique: direct laryngoscopy  Facilitating devices/methods: Bougie  Endotracheal tube insertion site: oral  Blade: Corbin  Blade size: 3  ETT size (mm): 7.5  Cormack-Lehane Classification: grade IIb - view of arytenoids or posterior of glottis only  Placement verified by: chest auscultation and capnometry   Measured from: teeth  Number of attempts at approach: 2

## 2019-03-05 NOTE — ANESTHESIA PREPROCEDURE EVALUATION
Anesthesia Evaluation     Patient summary reviewed and Nursing notes reviewed                Airway   Mallampati: II  Small opening  Dental      Pulmonary - negative pulmonary ROS   Cardiovascular     ECG reviewed  Rhythm: regular  Rate: normal    (+) hypertension, hyperlipidemia,       Neuro/Psych  (+) psychiatric history Anxiety,     GI/Hepatic/Renal/Endo    (+)  GERD,  renal disease stones,     Musculoskeletal (-) negative ROS    Abdominal    Substance History - negative use     OB/GYN negative ob/gyn ROS         Other      history of cancer                    Anesthesia Plan    ASA 3     general     intravenous induction   Anesthetic plan, all risks, benefits, and alternatives have been provided, discussed and informed consent has been obtained with: patient.

## 2019-03-06 VITALS
TEMPERATURE: 98.1 F | DIASTOLIC BLOOD PRESSURE: 64 MMHG | BODY MASS INDEX: 31.04 KG/M2 | SYSTOLIC BLOOD PRESSURE: 92 MMHG | HEIGHT: 59 IN | HEART RATE: 93 BPM | RESPIRATION RATE: 16 BRPM | WEIGHT: 154 LBS | OXYGEN SATURATION: 95 %

## 2019-03-06 PROCEDURE — 25010000002 ENOXAPARIN PER 10 MG: Performed by: PLASTIC SURGERY

## 2019-03-06 PROCEDURE — 99024 POSTOP FOLLOW-UP VISIT: CPT | Performed by: SURGERY

## 2019-03-06 PROCEDURE — G0378 HOSPITAL OBSERVATION PER HR: HCPCS

## 2019-03-06 RX ORDER — OXYCODONE HYDROCHLORIDE 5 MG/1
5 TABLET ORAL EVERY 4 HOURS PRN
Qty: 20 TABLET | Refills: 0 | Status: SHIPPED | OUTPATIENT
Start: 2019-03-06 | End: 2019-03-19

## 2019-03-06 RX ORDER — DOXYCYCLINE 50 MG/1
100 CAPSULE ORAL 2 TIMES DAILY
Qty: 16 CAPSULE | Refills: 0 | Status: SHIPPED | OUTPATIENT
Start: 2019-03-06 | End: 2019-03-10

## 2019-03-06 RX ORDER — ACETAMINOPHEN 325 MG/1
650 TABLET ORAL EVERY 4 HOURS PRN
Qty: 60 TABLET | Refills: 0 | Status: SHIPPED | OUTPATIENT
Start: 2019-03-06 | End: 2019-06-03

## 2019-03-06 RX ORDER — GABAPENTIN 100 MG/1
100 CAPSULE ORAL EVERY 8 HOURS SCHEDULED
Qty: 60 CAPSULE | Refills: 1 | Status: SHIPPED | OUTPATIENT
Start: 2019-03-06 | End: 2019-05-09

## 2019-03-06 RX ORDER — DOCUSATE SODIUM 250 MG
250 CAPSULE ORAL 2 TIMES DAILY PRN
Qty: 60 CAPSULE | Refills: 1 | Status: SHIPPED | OUTPATIENT
Start: 2019-03-06 | End: 2019-03-19

## 2019-03-06 RX ADMIN — OXYCODONE 5 MG: 5 TABLET ORAL at 05:38

## 2019-03-06 RX ADMIN — KETOTIFEN FUMARATE 1 DROP: 0.35 SOLUTION/ DROPS OPHTHALMIC at 08:11

## 2019-03-06 RX ADMIN — GABAPENTIN 100 MG: 100 CAPSULE ORAL at 00:46

## 2019-03-06 RX ADMIN — ENOXAPARIN SODIUM 40 MG: 40 INJECTION SUBCUTANEOUS at 07:58

## 2019-03-06 RX ADMIN — GABAPENTIN 100 MG: 100 CAPSULE ORAL at 07:58

## 2019-03-06 RX ADMIN — OXYCODONE 5 MG: 5 TABLET ORAL at 00:46

## 2019-03-06 RX ADMIN — DOCUSATE SODIUM 100 MG: 100 CAPSULE, LIQUID FILLED ORAL at 07:58

## 2019-03-06 RX ADMIN — DOXYCYCLINE 100 MG: 100 INJECTION, POWDER, LYOPHILIZED, FOR SOLUTION INTRAVENOUS at 07:59

## 2019-03-07 LAB
CYTO UR: NORMAL
LAB AP CASE REPORT: NORMAL
LAB AP SPECIAL STAINS: NORMAL
LAB AP SYNOPTIC CHECKLIST: NORMAL
Lab: NORMAL
PATH REPORT.FINAL DX SPEC: NORMAL
PATH REPORT.GROSS SPEC: NORMAL

## 2019-03-08 ENCOUNTER — TELEPHONE (OUTPATIENT)
Dept: ONCOLOGY | Facility: CLINIC | Age: 54
End: 2019-03-08

## 2019-03-08 ENCOUNTER — TELEPHONE (OUTPATIENT)
Dept: SURGERY | Facility: CLINIC | Age: 54
End: 2019-03-08

## 2019-03-08 DIAGNOSIS — D05.12 DUCTAL CARCINOMA IN SITU (DCIS) OF LEFT BREAST: Primary | ICD-10-CM

## 2019-03-08 NOTE — TELEPHONE ENCOUNTER
I called Ms. Parada to discuss her pathology report. She is recovering well from surgery. I will a place referral for med/onc to discuss the risks/benefits of chemoprevention.      Vonnie Cat MD

## 2019-03-15 ENCOUNTER — OFFICE VISIT (OUTPATIENT)
Dept: FAMILY MEDICINE CLINIC | Facility: CLINIC | Age: 54
End: 2019-03-15

## 2019-03-15 VITALS
WEIGHT: 153 LBS | BODY MASS INDEX: 30.84 KG/M2 | HEIGHT: 59 IN | SYSTOLIC BLOOD PRESSURE: 113 MMHG | HEART RATE: 82 BPM | DIASTOLIC BLOOD PRESSURE: 77 MMHG | RESPIRATION RATE: 16 BRPM | OXYGEN SATURATION: 98 %

## 2019-03-15 DIAGNOSIS — D05.12 DUCTAL CARCINOMA IN SITU (DCIS) OF LEFT BREAST: Primary | ICD-10-CM

## 2019-03-15 DIAGNOSIS — F41.9 ANXIETY: ICD-10-CM

## 2019-03-15 PROCEDURE — 99212 OFFICE O/P EST SF 10 MIN: CPT | Performed by: NURSE PRACTITIONER

## 2019-03-15 NOTE — PROGRESS NOTES
Subjective   Mason Parada is a 53 y.o. female.     History of Present Illness   Patient presents to office to discuss recent diagnosis of breast cancer.  She had abnormal mammogram per Dr. Hernandez at [facility].  Patient was asymptomatic at the time. Her family history includes an aunt on her father's side that had breast cancer and patient's sister who had ovarian cancer. 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 Her [employer] genetic testing was negative which was naturally a relief for her.   Patient underwent left mastectomy with reconstruction on 3/5/19 per Dr. Cat and Dr. Wermerling.   Patient reports doing well since surgery. She reports very little pain. She has been taking gabapentin 100 mg every 8 hours, but is uncertain about continuing. She is not sure if it is really helping as she has not had much pain.  She has not, however, stopped taking it for any length of time.  She denies any side effects with medication.   She states she has appt with Dr. Wermerling tomorrow.  She will discuss this at appt.    She has first appt with Dr. Bailey on 3/19 and also has f/u appt with Dr. Cat on 3/19.    Patient reports overall feeling well. She states she is very positive about her current situation. She states she feels fortunate that it was not found to be more invasive and that the genetic testing was negative.  She is still taking hydroxyzine as needed for anxiety and feels it is doing well.  She has certainly d/c her HRT.  Other medications have not changed.   The following portions of the patient's history were reviewed and updated as appropriate: allergies, current medications, past family history, past medical history, past social history, past surgical history and problem list.    Review of Systems   Constitutional: Negative for unexpected weight change.   Respiratory: Negative for shortness of breath.    Cardiovascular: Negative for chest pain and  palpitations.   Psychiatric/Behavioral: Negative for behavioral problems.       Objective   Physical Exam   Constitutional: She is oriented to person, place, and time. She appears well-developed and well-nourished.   Pulmonary/Chest: Effort normal.   Neurological: She is alert and oriented to person, place, and time.   Psychiatric: She has a normal mood and affect. Her speech is normal and behavior is normal. Judgment and thought content normal. Cognition and memory are normal.   Nursing note and vitals reviewed.      Assessment/Plan   Mason was seen today for discussion.    Diagnoses and all orders for this visit:    Ductal carcinoma in situ (DCIS) of left breast    Anxiety

## 2019-03-19 ENCOUNTER — LAB (OUTPATIENT)
Dept: LAB | Facility: HOSPITAL | Age: 54
End: 2019-03-19

## 2019-03-19 ENCOUNTER — CONSULT (OUTPATIENT)
Dept: ONCOLOGY | Facility: CLINIC | Age: 54
End: 2019-03-19

## 2019-03-19 VITALS
SYSTOLIC BLOOD PRESSURE: 126 MMHG | OXYGEN SATURATION: 98 % | TEMPERATURE: 98.8 F | HEART RATE: 69 BPM | RESPIRATION RATE: 16 BRPM | HEIGHT: 59 IN | DIASTOLIC BLOOD PRESSURE: 78 MMHG | WEIGHT: 154.4 LBS | BODY MASS INDEX: 31.12 KG/M2

## 2019-03-19 DIAGNOSIS — D05.10 DUCTAL CARCINOMA IN SITU (DCIS) OF BREAST, UNSPECIFIED LATERALITY: Primary | ICD-10-CM

## 2019-03-19 DIAGNOSIS — D05.12 DUCTAL CARCINOMA IN SITU (DCIS) OF LEFT BREAST: Primary | ICD-10-CM

## 2019-03-19 DIAGNOSIS — D05.10 DUCTAL CARCINOMA IN SITU (DCIS) OF BREAST, UNSPECIFIED LATERALITY: ICD-10-CM

## 2019-03-19 DIAGNOSIS — D05.12 DUCTAL CARCINOMA IN SITU (DCIS) OF LEFT BREAST: ICD-10-CM

## 2019-03-19 LAB
BASOPHILS # BLD AUTO: 0.09 10*3/MM3 (ref 0–0.2)
BASOPHILS NFR BLD AUTO: 0.9 % (ref 0–1.5)
DEPRECATED RDW RBC AUTO: 36.5 FL (ref 37–54)
EOSINOPHIL # BLD AUTO: 0.26 10*3/MM3 (ref 0–0.4)
EOSINOPHIL NFR BLD AUTO: 2.5 % (ref 0.3–6.2)
ERYTHROCYTE [DISTWIDTH] IN BLOOD BY AUTOMATED COUNT: 12.1 % (ref 12.3–15.4)
FSH SERPL-ACNC: 71.3 MIU/ML
HCT VFR BLD AUTO: 39.4 % (ref 34–46.6)
HGB BLD-MCNC: 13.8 G/DL (ref 12–15.9)
IMM GRANULOCYTES # BLD AUTO: 0.05 10*3/MM3 (ref 0–0.05)
IMM GRANULOCYTES NFR BLD AUTO: 0.5 % (ref 0–0.5)
LH SERPL-ACNC: 32.4 MIU/ML
LYMPHOCYTES # BLD AUTO: 2.41 10*3/MM3 (ref 0.7–3.1)
LYMPHOCYTES NFR BLD AUTO: 23.6 % (ref 19.6–45.3)
MCH RBC QN AUTO: 29.7 PG (ref 26.6–33)
MCHC RBC AUTO-ENTMCNC: 35 G/DL (ref 31.5–35.7)
MCV RBC AUTO: 84.9 FL (ref 79–97)
MONOCYTES # BLD AUTO: 0.57 10*3/MM3 (ref 0.1–0.9)
MONOCYTES NFR BLD AUTO: 5.6 % (ref 5–12)
NEUTROPHILS # BLD AUTO: 6.84 10*3/MM3 (ref 1.4–7)
NEUTROPHILS NFR BLD AUTO: 66.9 % (ref 42.7–76)
NRBC BLD AUTO-RTO: 0 /100 WBC (ref 0–0)
PLATELET # BLD AUTO: 387 10*3/MM3 (ref 140–450)
PMV BLD AUTO: 9.4 FL (ref 6–12)
RBC # BLD AUTO: 4.64 10*6/MM3 (ref 3.77–5.28)
WBC NRBC COR # BLD: 10.22 10*3/MM3 (ref 3.4–10.8)

## 2019-03-19 PROCEDURE — 99245 OFF/OP CONSLTJ NEW/EST HI 55: CPT | Performed by: INTERNAL MEDICINE

## 2019-03-19 PROCEDURE — 83002 ASSAY OF GONADOTROPIN (LH): CPT | Performed by: INTERNAL MEDICINE

## 2019-03-19 PROCEDURE — 85025 COMPLETE CBC W/AUTO DIFF WBC: CPT | Performed by: INTERNAL MEDICINE

## 2019-03-19 PROCEDURE — 36415 COLL VENOUS BLD VENIPUNCTURE: CPT | Performed by: INTERNAL MEDICINE

## 2019-03-19 PROCEDURE — 83001 ASSAY OF GONADOTROPIN (FSH): CPT | Performed by: INTERNAL MEDICINE

## 2019-03-19 NOTE — PROGRESS NOTES
Subjective     REASON FOR CONSULTATION: Newly diagnosed DCIS, Tis N0, stage 0 DCIS  Provide an opinion on any further workup or treatment                             REQUESTING PHYSICIAN: Dr. Vonnie Onofre    RECORDS OBTAINED:  Records of the patients history including those obtained from the referring provider were reviewed and summarized in detail.    HISTORY OF PRESENT ILLNESS:  The patient is a 53 y.o. year old female who is here for an opinion about the above issue.    History of Present Illness patient is a 53-year-old female who works at [employer], she follows up with  Ashlee SHERMAN and also with LANE Hernandez at OB/GYN at [facility].  She had a routine mammogram    November 1, 2018: Had screening mammogram which showed calcifications in the right breast as well as on the left breast.  With prominent axillary lymph nodes in the left axilla.  Screening MMG with Cel[facility] ([facility]): Heterogeneously dense.  1. There is a focal asymmetry and calcifications seen in the posterior one-third central region of the right breast.  2. There is a focal asymmetry with associated calcifications seen in the middle one-third central region of the right breast.  3. There are calcifications seen in the posterior one-third superior region of the left breast.  4. There are prominent axillary lymph nodes in the left axilla.    Diagnostic mammogram bilateral, January 4, 2019  The right breast does not show any abnormality in the focal asymmetry does not persist in the right breast.       IMPRESSION:  1. Area in the right breast is benign-negative.  2. Calcifications in the middle one third of the left breast at 12 to 1:00, centrally located 7 cm from the nipple are suspicious. Biopsy is recommended.  3. Calcifications in the posterior one third 1:00 region of the left breast located 8 cm from the nipple are suspicious. Biopsy is recommended.  4. Fatty  "lymph nodes in the left axilla are suspicious. Aspiration is recommended.  5. Cluster of cysts in the subareolar region of the left breast is suspicious. Cyst aspiration is recommended.  6. Simple cysts in the left breast are benign-negative.    Biopsy, done January 28, 2019.  1/28/2019      Left breast, subareolar, Cyst Aspiration ([facility]):   Left subareolar breast tissue, ultrasound guided aspiration, thin prep:   No malignant cells identified. Cytologic features consistent with benign fibrocystic changes.  -1/4 cc of blood-tinged fluid was aspirated. The walls of the cyst collapsed. There was partial sonographic resolution. Concordant.     Left axillary lymph node FNA ([facility]):  -Procedure not performed, due to no suspicious lymph nodes on 1/28/19 exam.            Biopsy     The subareolar left breast cyst aspiration was negative for any malignant cells.  The left axillary lymph node biopsy was not performed as no suspicious lymph nodes were seen on exam.    January 28/2019, left breast stereotactic biopsy x2  The biopsy of the 12 to 1 o'clock position showed intermediate grade DCIS with central necrosis.  No invasive carcinoma identified.  1. Central Left breast Tissue, 12-1:00, \"tophat\" clip:  Intermediate Grade Cribriform Ductal Carcinoma in Situ with Central Necrosis, Microcalcification and Lobular Extension.   Largest focus of DCIS measuring 6 mm.   No invasive carcinoma identified.   Fibrofatty breast tissue also showing florid intraductal hyperplasia of the usual type, cystically dilated and ectatic ducts, patchy mild chronic stromal inflammation, and stromal microcalcifications.      Estrogen receptor 98.96%  Progesterone receptor 92.91%  Ki-67 3.18%.    2. Left Breast Tissue, 1:30, \"minicork\" clip:   Low to Intermediate Grade Cribriform and Solid Ductal Carcinoma in Situ with Central Necrosis, Microcalcifications and Lobular Extension.   Largest focus of DCIS measuring 4 mm.   No invasive carcinoma identified. "   Fibrofatty breast tissue also showing columnar cell change, patchy mild chronic stromal inflammation and stromal microcalcifications.      ER+ (98.04%, strong)  WI+ (88.62%, strong)  Ki67 4.4%    2019:  MRI breast shows    IMPRESSION:  1. Biopsy-proven site of malignancy represented by the T-shaped metallic  clip seen at the 12-o'clock position in the left breast. There remains  clumped enhancement that as an aggregate measures on the order of 1.8 cm  that is seen immediately lateral to the T-shaped clip in the location of  calcifications seen mammographically. No evidence for left axillary  adenopathy is appreciated.  2. Biopsy-proven site of malignancy represented by a barrel-shaped  metallic clip in the posterior one-third of the left breast at the  1-o'clock position with clumped enhancement seen on the order of 1 cm  inferior to the metallic clip that measures on the order of 1.1 cm in  greatest dimension. Calcifications appear to be present mammographically  in this region. If further imaging evaluation is desired prior to  surgical therapy, correlation with mammography of the left breast is  suggested. No evidence for left axillary adenopathy is appreciated.  3. There are no findings suspicious for malignancy in the right breast.  4. Scattered enhancement of a benign parenchymal character is noted in  both breasts.\      Patient has family history of breast cancer in a maternal aunt in the late 60s.  Her sister had cervical cancer at age 28.  Father had lung cancer at age 62 and  with lung cancer at age 64.    Patient underwent genetic testing.  The InVitae genetic test is negative.    Patient underwent left mastectomy, skin sparing with sentinel lymph node biopsy on 2019.  She has reconstruction done by Dr. Onofre with placement of left breast prepectoral tissue expander.  And placement of acellular dermal matrix.    Pathology shows  Synoptic Checklist   DCIS OF THE BREAST  "  Breast DCIS - All Specimens   CLINICAL   Clinical History  Prior history of breast cancer    Radiologic Finding  Calcifications    SPECIMEN   Procedure  Total mastectomy    Specimen Laterality  Left    TUMOR   Tumor Site  Upper outer quadrant      Upper inner quadrant    Histologic Type  Ductal carcinoma in situ    Size (Extent) of DCIS  Estimated size of DCIS greatest dimension in Millimeters (mm) is at least: Multifocal with one focus up to 45 mm, two up to 20 mm, and one  up to 23 mm. Millimeters (mm)   Number of Blocks with DCIS  14    Number of Blocks Examined  35    Architectural Patterns  Comedo      Cribriform      Micropapillary      Solid    Nuclear Grade  Grade II (intermediate)    Accessory Findings     Necrosis  Present, central (expansive \"comedo\" necrosis)    Microcalcifications  Present in DCIS      Present in nonneoplastic tissue    MARGINS   Margins  Uninvolved by DCIS    Distance of DCIS from Anterior Margin in Millimeters (mm)  18 Millimeters (mm)   Distance of DCIS from Posterior Margin in Millimeters (mm)  15 Millimeters (mm)   Distance of DCIS from Superior Margin in Millimeters (mm)  20 Millimeters (mm)   Distance of DCIS from Inferior Margin in Millimeters (mm)  100 Millimeters (mm)   Distance of DCIS from Medial Margin in Millimeters (mm)  40 Millimeters (mm)   Distance of DCIS from Lateral Margin in Millimeters (mm)  50 Millimeters (mm)   Distance from Closest Margin in Millimeters (mm)  15 Millimeters (mm)   Closest Margin  Posterior    LYMPH NODES   Regional Lymph Nodes  Uninvolved by tumor cells    Number of Lymph Nodes Examined  2    Number of Cobb Nodes Examined  2    PATHOLOGIC STAGE CLASSIFICATION (pTNM, AJCC 8th Edition)   TNM Descriptors  Not applicable    Primary Tumor (pT)  pTis (DCIS)    Regional Lymph Nodes (pN)          Patient is healing up from surgery, has a drain in place.    She is here to discuss risks and benefits of chemoprevention.      Past Medical History: "   Diagnosis Date   • Anxiety    • Breast cancer (CMS/HCC)     LEFT BREAST   • Carpal tunnel syndrome of right wrist    • Chest pain radiating to jaw     STRESS TEST OK 2018, IN EPIC - D/T STRESS, PANIC    • GERD (gastroesophageal reflux disease)    • Heart palpitations    • History of kidney stones    • Hyperlipidemia    • Hypertension         Past Surgical History:   Procedure Laterality Date   • BREAST BIOPSY Left    • BREAST RECONSTRUCTION Left 3/5/2019    Procedure: left prepectorasl placement of tissue expander with alloderm;  Surgeon: Abdirashid Ding MD;  Location: [facility];  Service: Plastics   •  SECTION      X1   • EXTRACORPOREAL SHOCK WAVE LITHOTRIPSY (ESWL)     • KIDNEY STONE SURGERY     • MASTECTOMY W/ SENTINEL NODE BIOPSY Left 3/5/2019    Procedure: LEFT SKIN-SPARING MASTECTOMY WITH SENTINEL LYMPH NODE BIOPSY;  Surgeon: Vonnie Cat MD;  Location: [facility];  Service: General        Current Outpatient Medications on File Prior to Visit   Medication Sig Dispense Refill   • Acetaminophen (TYLENOL ARTHRITIS PAIN PO) Take 500 mg by mouth As Needed.     • acetaminophen (TYLENOL) 325 MG tablet Take 2 tablets by mouth Every 4 (Four) Hours As Needed for Mild Pain . 60 tablet 0   • atorvastatin (LIPITOR) 10 MG tablet Take 10 mg by mouth Every Night.     • Cholecalciferol (VITAMIN D) 1000 units tablet Take 1,000 Units by mouth Daily.     • gabapentin (NEURONTIN) 100 MG capsule Take 1 capsule by mouth Every 8 (Eight) Hours. 60 capsule 1   • hydrOXYzine (ATARAX) 25 MG tablet Take 1 tablet by mouth 3 (Three) Times a Day As Needed for Itching or Anxiety. 60 tablet 11   • ketotifen (ZADITOR) 0.025 % ophthalmic solution Administer 1 drop to both eyes 2 (Two) Times a Day. 1 each 5   • lisinopril-hydrochlorothiazide (PRINZIDE,ZESTORETIC) 20-25 MG per tablet Take 1 tablet by mouth Every Night.     • oxybutynin XL (DITROPAN-XL) 10 MG 24 hr tablet Take 10 mg by mouth As Needed.     •  raNITIdine (ZANTAC) 150 MG tablet Take 150 mg by mouth Every Night.     • docusate sodium (COLACE) 250 MG capsule Take 1 capsule by mouth 2 (Two) Times a Day As Needed for Constipation. 60 capsule 1   • oxyCODONE (ROXICODONE) 5 MG immediate release tablet Take 1 tablet by mouth Every 4 (Four) Hours As Needed for Moderate Pain . 20 tablet 0     No current facility-administered medications on file prior to visit.         ALLERGIES:    Allergies   Allergen Reactions   • Hydrocodone Itching   • Sulfa Antibiotics Itching     swelling      OB/GYN history:  Age of menstrual.,  12 years  Last menstrual.  Was 2018, patient is perimenopausal   5 para 4, 1 miscarriage.  Age of first childbirth is 29  She did breast-feed all of her kids.  At least for 6 months.  She was exposed to birth control pills for 7 years.  More recently she was on hormone treatment because of heavy.'s which now has been discontinued.      Social history she is an LPN at [employer].  She does not smoke.  She drinks alcohol once a week.  She is  for the last 22 years and has a child living with her who is 15 years old.      Family history: Father had lung cancer and  at age 64 with lung cancer was diagnosed at 62.  Mother is 72 in good health.  She has 1 brother 48 in good health.  She has 1 sister who had cervical cancer at age 28 but is in good health at 47.  She had a paternal aunt who had breast cancer in her late 60s.    Family History   Problem Relation Age of Onset   • Hypertension Mother    • Other Mother         Blood clots   • Emphysema Father    • Lung cancer Father    • Bone cancer Father 62   • Lupus Sister    • Other Brother         kidney stone   • Heart disease Maternal Grandfather 55   • Diabetes Maternal Grandfather    • Hypertension Maternal Grandfather    • Pancreatic cancer Paternal Grandmother 68   • Other Paternal Grandfather         kidney stone   • Stroke Maternal Grandmother    • Breast cancer  "Paternal Aunt         late 60's   • Ovarian cancer Sister 34   • Malig Hyperthermia Neg Hx         Review of Systems   Constitutional: Negative for appetite change, chills, diaphoresis, fatigue, fever and unexpected weight change.   HENT: Negative for hearing loss, sore throat and trouble swallowing.    Respiratory: Negative for cough, chest tightness, shortness of breath and wheezing.    Cardiovascular: Negative for chest pain, palpitations and leg swelling.   Gastrointestinal: Negative for abdominal distention, abdominal pain, constipation, diarrhea, nausea and vomiting.   Genitourinary: Negative for dysuria, frequency, hematuria and urgency.   Musculoskeletal: Negative for joint swelling.        No muscle weakness.   Skin: Negative for rash and wound.   Neurological: Negative for seizures, syncope, speech difficulty, weakness, numbness and headaches.   Hematological: Negative for adenopathy. Does not bruise/bleed easily.   Psychiatric/Behavioral: Negative for behavioral problems, confusion and suicidal ideas.   Patient has some pain in the left breast mastectomy site.  This is postsurgical in nature.    Objective     Vitals:    03/19/19 1034   BP: 126/78   Pulse: 69   Resp: 16   Temp: 98.8 °F (37.1 °C)   TempSrc: Oral   SpO2: 98%   Weight: 70 kg (154 lb 6.4 oz)   Height: 151 cm (59.45\")   PainSc:   4   PainLoc: Breast  Comment: Left breast pain     Current Status 3/19/2019   ECOG score 0       Physical Exam      GENERAL:  Well-developed, well-nourished in no acute distress.   SKIN:  Warm, dry without rashes, purpura or petechiae.  EYES:  Pupils equal, round and reactive to light.  EOMs intact.  Conjunctivae normal.  EARS:  Hearing intact.  NOSE:  Septum midline.  No excoriations or nasal discharge.  MOUTH:  Tongue is well-papillated; no stomatitis or ulcers.  Lips normal.  THROAT:  Oropharynx without lesions or exudates.  NECK:  Supple with good range of motion; no thyromegaly or masses, no JVD.  LYMPHATICS:  No " cervical, supraclavicular, axillary or inguinal adenopathy.  CHEST:  Lungs clear to auscultation. Good airflow.  BREASTS: Left breast, status post left mastectomy with reconstruction, appears to be healing up, there is a drain in place and the drain site appears clean.  There is no left axillary adenopathy or left supraclavicular adenopathy.  Right breast: Is without any skin changes, no nipple retraction, no evidence of any right breast mass, no evidence of right axillary adenopathy or right supraclavicular adenopathy.  CARDIAC:  Regular rate and rhythm without murmurs, rubs or gallops. Normal S1,S2.  ABDOMEN:  Soft, nontender with no hepatosplenomegaly or masses.  EXTREMITIES:  No clubbing, cyanosis or edema.  NEUROLOGICAL:  Cranial Nerves II-XII grossly intact.  No focal neurological deficits.  PSYCHIATRIC:  Normal affect and mood.        RECENT LABS:  Hematology WBC   Date Value Ref Range Status   03/19/2019 10.22 3.40 - 10.80 10*3/mm3 Final   12/10/2018 8.91 4.50 - 10.70 10*3/mm3 Final     RBC   Date Value Ref Range Status   03/19/2019 4.64 3.77 - 5.28 10*6/mm3 Final   12/10/2018 4.51 3.90 - 5.20 10*6/mm3 Final     Hemoglobin   Date Value Ref Range Status   03/19/2019 13.8 12.0 - 15.9 g/dL Final     Hematocrit   Date Value Ref Range Status   03/19/2019 39.4 34.0 - 46.6 % Final     Platelets   Date Value Ref Range Status   03/19/2019 387 140 - 450 10*3/mm3 Final      RADIOPHARMACEUTICAL INJECTION INTO THE LEFT BREAST FOR LEFT BREAST  SENTINEL NODE LOCALIZATION 03/05/2019  FINDINGS:  560 uCi technetium sulfur colloid was injected intradermally  in a circumareolar region of the left breast.    BILATERAL BREAST MRI  IMPRESSION:  1. Biopsy-proven site of malignancy represented by the T-shaped metallic  clip seen at the 12-o'clock position in the left breast. There remains  clumped enhancement that as an aggregate measures on the order of 1.8 cm  that is seen immediately lateral to the T-shaped clip in the location  of  calcifications seen mammographically. No evidence for left axillary  adenopathy is appreciated.  2. Biopsy-proven site of malignancy represented by a barrel-shaped  metallic clip in the posterior one-third of the left breast at the  1-o'clock position with clumped enhancement seen on the order of 1 cm  inferior to the metallic clip that measures on the order of 1.1 cm in  greatest dimension. Calcifications appear to be present mammographically  in this region. If further imaging evaluation is desired prior to  surgical therapy, correlation with mammography of the left breast is  suggested. No evidence for left axillary adenopathy is appreciated.  3. There are no findings suspicious for malignancy in the right breast.  4. Scattered enhancement of a benign parenchymal character is noted in  both breasts.    Assessment/Plan     1.  Tis N0, stage 0 DCIS of the left breast status post left mastectomy with sentinel lymph nodes on March 5, 2019.  She is here for follow-up to discuss the risks versus the benefits of chemoprevention.  · Screening mammogram had shown focal asymmetry and calcifications in the posterior one third region of the right breast and also in the middle one third region of the right breast.  There is calcification in the posterior one third superior region of the left breast.  There are prominent axillary nodes in the left axilla.  · Diagnostic mammogram, January 4, 2019 shows right breast was negative.,  Calcifications in the left breast 12-1 o'clock position, 7 cm from the nipple, biopsy recommended, calcifications at 1 o'clock position left breast 8 cm from the nipple, biopsy recommended fatty lymph nodes in the left axilla, aspiration recommended.  Subareolar cyst aspiration suggested  · January 20, 2019 left breast cyst aspiration negative, left breast lymph node biopsy not done due to no suspicious lymph nodes on physical exam,        Left breast biopsy stereotactic x2, 12 to 1 o'clock  position biopsy showed DCIS, with central necrosis, 6 mm ER              +98%, WA +92%, Ki-67 3.18%, margins are clear         Left breast biopsy at 130 position shows low to intermediate grade cribriform and solid ductal carcinoma in situ with        central necrosis, largest focus 4 mm, no invasive carcinoma, ER 98%, WA 88%, Ki-67 4.4%, margins are clear.  2        sentinel lymph nodes negative.    · Patient is 2 weeks out of surgery and healing well.  Has a drain in place.  I had a lengthy discussion about chemoprophylaxis and discussed in length the side effects of tamoxifen/aromatase inhibitors/Evista.  Given that she is perimenopausal as her last menstrual period was November 2018, we will check her hormone levels today, LH, FSH and estrogen levels.  Discussed the side effects of tamoxifen which can cause hot flashes, blood clots, rare chance for uterine cancer, weight gain, cataracts, depression, very rarely hair thinning and thrombocytopenia.  Also discussed about aromatase inhibitor therapy side effects with arthralgias/decrease in bone density testing.,  Hot flashes.  Currently the best option for her is to start on tamoxifen 20 mg daily after 3 weeks as she is still healing up from surgery and she is willing to consider that after 2 years of tamoxifen we could potentially switch her to aromatase inhibitor therapy..  Patient understands all the side effects and length.    2.  Family history positive for cervical cancer in her sister who was 28 years old, breast cancer in paternal aunt in late 60s and lung cancer in her dad at age 62.  Genetic testing has been done. InVitae testing is negative for any mutations.    3.  History of multiple kidney stones, had 16 kidney stones, underwent 2 laser treatments and one lithotripsy so far.  She was told to not take calcium supplements.  She had sepsis with 1 of her kidney stones during admission to [facility].    4.  Mild vitamin D deficiency.    Plan 1.   Status post left mastectomy with sentinel lymph node biopsy.    2.  Start tamoxifen 20 mg once daily in 3 weeks from now.  After 2 years of tamoxifen we can switch her to aromatase inhibitor if postmenopausal.    3.  Patient to follow-up with me in 8 weeks to evaluate toxicity related to tamoxifen.    4.  We will check estrogen, LH, FSH to see if she is postmenopausal.    Thank you for allowing me to participate in the care of this patient.    Baylee Bailey MD

## 2019-03-20 ENCOUNTER — OFFICE VISIT (OUTPATIENT)
Dept: SURGERY | Facility: CLINIC | Age: 54
End: 2019-03-20

## 2019-03-20 VITALS
HEART RATE: 79 BPM | WEIGHT: 154 LBS | BODY MASS INDEX: 31.04 KG/M2 | HEIGHT: 59 IN | SYSTOLIC BLOOD PRESSURE: 118 MMHG | OXYGEN SATURATION: 96 % | TEMPERATURE: 98.2 F | DIASTOLIC BLOOD PRESSURE: 72 MMHG

## 2019-03-20 DIAGNOSIS — D05.12 DUCTAL CARCINOMA IN SITU (DCIS) OF LEFT BREAST: ICD-10-CM

## 2019-03-20 DIAGNOSIS — Z48.89 POSTOPERATIVE VISIT: Primary | ICD-10-CM

## 2019-03-20 PROCEDURE — 99024 POSTOP FOLLOW-UP VISIT: CPT | Performed by: SURGERY

## 2019-03-21 LAB — ESTROGEN SERPL-MCNC: 70 PG/ML

## 2019-03-21 NOTE — PROGRESS NOTES
[facility]     Referring Provider: Julia Whitlock MD     Chief complaint: Postoperative visit     HPI:   2/4/19:  Ms. Mason Parada is a 52 yo woman, seen at the request of Dr. Julia Whitlock, for a new diagnosis of left breast DCIS. This was initially detected as an imaging abnormality. Her work-up is detailed in the oncologic history below. She denies any breast lumps, pain, skin changes, or nipple discharge. She denies any prior history of abnormal mammograms or breast biopsies. She has a family history of breast cancer in a paternal aunt, diagnosed in her late 60's. She has a family history of ovarian cancer in her sister, diagnosed at age 34.     3/21/19, Interval History:  She underwent left mastectomy & SLNB with TE reconstruction on 3/5/19. See surgery & pathology details below in oncologic history. She has been recovering well and has no complaints. She has already seen Dr. Ding back and had one drain removed. She has seen Dr. Bailey and discussed chemoprevention. She will be starting tamoxifen in a few weeks. She was joined today in clinic by her daughters. She gave consent for her daughters to be present during her examination and participate in the discussion.        Oncology/Hematology History    2008, Screening MMG: Negative, per pt report.         Ductal carcinoma in situ (DCIS) of left breast    10/31/2018 Initial Diagnosis     Ductal carcinoma in situ (DCIS) of left breast         11/1/2018 Imaging     Screening MMG with Celso ([facility]): Heterogeneously dense.  1. There is a focal asymmetry and calcifications seen in the posterior one-third central region of the right breast.  2. There is a focal asymmetry with associated calcifications seen in the middle one-third central region of the right breast.  3. There are calcifications seen in the posterior one-third superior region of the left breast.  4. There are prominent axillary lymph nodes in the left axilla.  BI-RADS 0: Incomplete.          1/4/2019 Imaging     Bilateral Diagnostic MMG with Celso & Bilateral US ([facility]):   MMG: Heterogeneously dense.  1. On the present examination, focal asymmetry in the right breast, central does not persist. The asymmetry noted on the recent screening mammogram resolves into stroma on additional views.  2. There are multiple amorphous and indistinct calcifications with segmental distribution in the middle one-third of the left breast at 12-1 o'clock, central located 7 cm from the nipple. These span approximately 2 cm. The asymmetry noted on the recent mammogram resolves into stroma on additional views.  3. There are several punctate and indistinct calcifications with linear distribution in the posterior one-third 1:30 o'clock region of the left breast located 8 cm from the nipple. These span approximately 1 cm.  4. There are a few small axillary lymph nodes in the left axilla.  US:  1. In the central, posterior one third region of the right breast, there is no evidence of any solid mass or abnormal cystic elements.  2. In the central, middle one-third of the left breast at 12-1 o'clock, there is no discrete solid or suspicious sonographic abnormalities.   4. Ultrasound is suggestive of a few fatty lymph nodes. The largest measures 19 x 8 x 12 mm. This demonstrates prominent cortex measuring up to 3 mm. The remaining smaller nodes demonstrate normal morphology and size.  5. There is an oval cluster of cyst with partially defined margins measuring 10 x 4 x 7 mm seen in the sub-areolar region of the left breast. Internal echotexture is heterogeneous.   6. There are a few small simple cyst seen in the left breast. These measure less than 1 cm.  IMPRESSION:  1. Area in the right breast is benign-negative.  2. Calcifications in the middle one third of the left breast at 12 to 1:00, centrally located 7 cm from the nipple are suspicious. Biopsy is recommended.  3. Calcifications in the posterior one third 1:00 region of the left  "breast located 8 cm from the nipple are suspicious. Biopsy is recommended.  4. Fatty lymph nodes in the left axilla are suspicious. Aspiration is recommended.  5. Cluster of cysts in the subareolar region of the left breast is suspicious. Cyst aspiration is recommended.  6. Simple cysts in the left breast are benign-negative.  BI-RADS 4B: Suspicious.          1/28/2019 Biopsy     Left breast, subareolar, Cyst Aspiration ([facility]):   Left subareolar breast tissue, ultrasound guided aspiration, thin prep:   No malignant cells identified. Cytologic features consistent with benign fibrocystic changes.  -1/4 cc of blood-tinged fluid was aspirated. The walls of the cyst collapsed. There was partial sonographic resolution. Concordant.    Left axillary lymph node FNA ([facility]):  -Procedure not performed, due to no suspicious lymph nodes on 1/28/19 exam.         1/28/2019 Biopsy     Left breast, Stereotactic Biopsy x 2 ([facility]):     1. Central Left breast Tissue, 12-1:00, \"tophat\" clip:  Intermediate Grade Cribriform Ductal Carcinoma in Situ with Central Necrosis, Microcalcification and Lobular Extension.   Largest focus of DCIS measuring 6 mm.   No invasive carcinoma identified.   Fibrofatty breast tissue also showing florid intraductal hyperplasia of the usual type, cystically dilated and ectatic ducts, patchy mild chronic stromal inflammation, and stromal microcalcifications.     ER+ (98.96%, strong)  [place]+ 92.91%, strong)  Ki67 3.18%    2. Left Breast Tissue, 1:30, \"minicork\" clip:   Low to Intermediate Grade Cribriform and Solid Ductal Carcinoma in Situ with Central Necrosis, Microcalcifications and Lobular Extension.   Largest focus of DCIS measuring 4 mm.   No invasive carcinoma identified.   Fibrofatty breast tissue also showing columnar cell change, patchy mild chronic stromal inflammation and stromal microcalcifications.     ER+ (98.04%, strong)  [place]+ (88.62%, strong)  Ki67 4.4%         1/31/2019 Imaging     Breast MRI ([facility]): "   Within the middle third of the left breast at the 12-o'clock position on the order of 5.5 cm from the nipple there is a metallic clip seen within a postbiopsy cavity that measures on the order of 1.4 cm in the superior to inferior dimension, 3.2 cm in anterior to posterior dimension and 3.1 cm in medial to lateral dimension. A metallic clip is seen within the central portion of the cavity. The metallic clip represents the T-shaped metallic clip seen on the postbiopsy mammogram. This represents a biopsy-proven site of malignancy. Clumped enhancement that has an aggregate measures on the order of 1.8 cm in superior to inferior dimension, 1.4 cm in anterior to posterior dimension and 1.3 cm in the medial to lateral dimension, as seen along the lateral margin of the T-shaped metallic clip. The postbiopsy mammogram is somewhat difficult to evaluate due to postbiopsy change, but calcifications are seen within this region.  In the posterior one-third of the upper quadrant of the left breast centered at the 1-o'clock position on the order of 7.7 cm from the nipple there is a metallic clip which represents the barrel-shaped metallic clip. This metallic clip is located on the order of 1.3 cm superior to clumped enhancement that measures on the order of 1.1 cm in anterior to posterior dimension, 0.7 cm in the superior to inferior dimension and 0.7 cm in the medial to lateral dimension. This represents a biopsy-proven site of malignancy. Mammographically on the postbiopsy mammogram, there is a suggestion of some microcalcifications seen inferior to this barrel-shaped metallic clip that appear to correspond to the enhancement seen in this region. No other areas of abnormal enhancement or morphology are seen within the left breast. There is no evidence for left axillary adenopathy. There are no findings suspicious for malignancy in the right breast.  BI-RADS 6: Known biopsy-proven malignancy.          2/4/2019 Genetic Testing      [facility] Common Hereditary Cancers & Breast Cancer STAT Panels (47 genes):  Negative         3/5/2019 Surgery     Left skin-sparing mastectomy with SLNB & prepectoral TE reconstruction     1. Lymph Node, [place] Node #1 (two levels):  Single benign lymph node.     2. Lymph Node, [place] Node  #2, Excision (two levels):  Single benign lymph node.     3. Breast, Simple Mastectomy, Skin Sparing: Multifocal ductal carcinoma in-situ.               A. The ductal carcinoma in-situ is of intermediate nuclear grade.               B. The ductal carcinoma in-situ has comedo, micropapillary, solid and cribriform architecture.               C. The ductal carcinoma in-situ has comedo type expansive necrosis.               D. The ductal carcinoma in-situ is multifocal with the largest confluent area measuring 45 mm.                    Additional separate foci include two foci up to 20 mm , and one up to 23 mm (ductal                   carcinoma is in fourteen of thirty-one blocks (14/35).               F. The ductal carcinoma in-situ comes within 20 mm of the superior margin, 100 mm of the inferior margin, 40                    mm of the medial margin, 50 mm of the lateral margin, 18 mm of the anterior margin and 15 mm of the posterior margin (closest margin posterior 15 mm away).               F. Hormone receptors were performed on prior tissue and were ER and [place] positive.            Review of Systems:  See interval history.       Medications:    Current Outpatient Medications:   •  acetaminophen (TYLENOL) 325 MG tablet, Take 2 tablets by mouth Every 4 (Four) Hours As Needed for Mild Pain ., Disp: 60 tablet, Rfl: 0  •  Cholecalciferol (VITAMIN D) 1000 units tablet, Take 1,000 Units by mouth Daily., Disp: , Rfl:   •  gabapentin (NEURONTIN) 100 MG capsule, Take 1 capsule by mouth Every 8 (Eight) Hours., Disp: 60 capsule, Rfl: 1  •  hydrOXYzine (ATARAX) 25 MG tablet, Take 1 tablet by mouth 3 (Three) Times a Day As Needed for  Itching or Anxiety., Disp: 60 tablet, Rfl: 11  •  lisinopril-hydrochlorothiazide (PRINZIDE,ZESTORETIC) 20-25 MG per tablet, Take 1 tablet by mouth Every Night., Disp: , Rfl:   •  raNITIdine (ZANTAC) 150 MG tablet, Take 150 mg by mouth Every Night., Disp: , Rfl:   •  Acetaminophen (TYLENOL ARTHRITIS PAIN PO), Take 500 mg by mouth As Needed., Disp: , Rfl:   •  atorvastatin (LIPITOR) 10 MG tablet, Take 10 mg by mouth Every Night., Disp: , Rfl:   •  ketotifen (ZADITOR) 0.025 % ophthalmic solution, Administer 1 drop to both eyes 2 (Two) Times a Day., Disp: 1 each, Rfl: 5  •  oxybutynin XL (DITROPAN-XL) 10 MG 24 hr tablet, Take 10 mg by mouth As Needed., Disp: , Rfl:       Allergies   Allergen Reactions   • Hydrocodone Itching   • Sulfa Antibiotics Itching     swelling       Family History   Problem Relation Age of Onset   • Hypertension Mother    • Other Mother         Blood clots   • Emphysema Father    • Lung cancer Father    • Bone cancer Father 62   • Lupus Sister    • Other Brother         kidney stone   • Heart disease Maternal Grandfather 55   • Diabetes Maternal Grandfather    • Hypertension Maternal Grandfather    • Pancreatic cancer Paternal Grandmother 68   • Other Paternal Grandfather         kidney stone   • Stroke Maternal Grandmother    • Breast cancer Paternal Aunt         late 60's   • Ovarian cancer Sister 34   • Malig Hyperthermia Neg Hx          PHYSICAL EXAMINATION:   Vitals:    03/20/19 1524   BP: 118/72   Pulse: 79   Temp: 98.2 °F (36.8 °C)   SpO2: 96%     ECOG 0 - Asymptomatic  General: NAD, well appearing  Psych: a&o x 3, normal mood and affect  Eyes: EOMI, no scleral icterus  ENMT: neck supple without masses or thyromegaly, mucus membranes moist  MSK: normal gait, normal ROM in bilateral shoulders  Lymph nodes: no left axillary fluid collection  Breast:  Right: deferred  Left: sp mastectomy with well-healed incision, healthy flaps, drain serosanguinous     Jaqueline Anthony MA was present for the  entire examination.       Assessment:  53 y.o. F with a diagnosis of multifocal left breast DCIS: intermediate grade, ER/AZ+. Now sp left [facility] & SLNB on 3/5/29, pTis (largest focus 4.5 cm).     Plan:  -Continue reconstruction management per Dr. Ding.  -Continue chemoprevention per Dr. Bailey.   -PT/lymphedema clinic evaluation.  -F/u in 3 months for CBE. She will be due for right screening MMG in 11/2019.  -She was instructed to call sooner with any questions, concerns or changes on BSE.    Vonnie Cat MD      CC:  MD Ashlee Bergeron, LANE Hernandez, LANE Bailey MD

## 2019-04-02 ENCOUNTER — DOCUMENTATION (OUTPATIENT)
Dept: ONCOLOGY | Facility: CLINIC | Age: 54
End: 2019-04-02

## 2019-04-02 ENCOUNTER — HOSPITAL ENCOUNTER (OUTPATIENT)
Dept: PHYSICAL THERAPY | Facility: HOSPITAL | Age: 54
Setting detail: THERAPIES SERIES
Discharge: HOME | End: 2019-04-02

## 2019-04-02 DIAGNOSIS — D05.12 DUCTAL CARCINOMA IN SITU (DCIS) OF LEFT BREAST: Primary | ICD-10-CM

## 2019-04-02 DIAGNOSIS — M25.612 STIFF SHOULDER, LEFT: ICD-10-CM

## 2019-04-02 DIAGNOSIS — Z42.1 AFTERCARE POSTMASTECTOMY FOR BREAST RECONSTRUCTION: ICD-10-CM

## 2019-04-02 DIAGNOSIS — Z90.12 S/P LEFT MASTECTOMY: ICD-10-CM

## 2019-04-02 DIAGNOSIS — I10 ESSENTIAL HYPERTENSION: ICD-10-CM

## 2019-04-02 DIAGNOSIS — Z48.89 AFTERCARE FOLLOWING SURGERY: ICD-10-CM

## 2019-04-02 DIAGNOSIS — Z91.89 AT RISK FOR LYMPHEDEMA: ICD-10-CM

## 2019-04-02 DIAGNOSIS — M25.512 ACUTE PAIN OF LEFT SHOULDER: ICD-10-CM

## 2019-04-02 DIAGNOSIS — M25.612 DECREASED RANGE OF MOTION OF LEFT SHOULDER: ICD-10-CM

## 2019-04-02 PROCEDURE — 97162 PT EVAL MOD COMPLEX 30 MIN: CPT | Performed by: PHYSICAL THERAPIST

## 2019-04-02 PROCEDURE — 97110 THERAPEUTIC EXERCISES: CPT | Performed by: PHYSICAL THERAPIST

## 2019-04-02 NOTE — PROGRESS NOTES
LCSW called pt's home, to follow-up on her Distress Questionnaire score of 5, from her 3/19/19 visit. When the phone was answered, there was loud static on the line. Unable to leave a message.
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
## 2019-04-15 ENCOUNTER — HOSPITAL ENCOUNTER (OUTPATIENT)
Dept: PHYSICAL THERAPY | Facility: HOSPITAL | Age: 54
Setting detail: THERAPIES SERIES
Discharge: HOME | End: 2019-04-15

## 2019-04-15 DIAGNOSIS — M25.612 STIFF SHOULDER, LEFT: ICD-10-CM

## 2019-04-15 DIAGNOSIS — Z91.89 AT RISK FOR LYMPHEDEMA: ICD-10-CM

## 2019-04-15 DIAGNOSIS — Z48.89 AFTERCARE FOLLOWING SURGERY: ICD-10-CM

## 2019-04-15 DIAGNOSIS — Z90.12 S/P LEFT MASTECTOMY: ICD-10-CM

## 2019-04-15 DIAGNOSIS — M25.612 DECREASED RANGE OF MOTION OF LEFT SHOULDER: ICD-10-CM

## 2019-04-15 DIAGNOSIS — Z42.1 AFTERCARE POSTMASTECTOMY FOR BREAST RECONSTRUCTION: ICD-10-CM

## 2019-04-15 DIAGNOSIS — I10 ESSENTIAL HYPERTENSION: ICD-10-CM

## 2019-04-15 DIAGNOSIS — M25.512 ACUTE PAIN OF LEFT SHOULDER: ICD-10-CM

## 2019-04-15 DIAGNOSIS — D05.12 DUCTAL CARCINOMA IN SITU (DCIS) OF LEFT BREAST: Primary | ICD-10-CM

## 2019-04-15 PROCEDURE — 97110 THERAPEUTIC EXERCISES: CPT | Performed by: PHYSICAL THERAPIST

## 2019-04-15 NOTE — THERAPY TREATMENT NOTE
Outpatient Physical Therapy Lymphedema Treatment Note  [facility]     Patient Name: Mason Parada  : 1965  MRN: [id number]  Today's Date: 4/15/2019        Visit Date: 04/15/2019    Visit Dx:    ICD-10-CM ICD-9-CM   1. Ductal carcinoma in situ (DCIS) of left breast D05.12 233.0   2. S/P left mastectomy Z90.12 V45.71   3. At risk for lymphedema Z91.89 V49.89   4. Decreased range of motion of left shoulder M25.612 719.51   5. Acute pain of left shoulder M25.512 719.41   6. Stiff shoulder, left M25.612 719.51   7. Aftercare following surgery Z48.89 V58.89   8. Aftercare postmastectomy for breast reconstruction Z42.1 V51.0   9. Essential hypertension I10 401.9       Patient Active Problem List   Diagnosis   • Essential hypertension   • Gastroesophageal reflux disease without esophagitis   • Seasonal allergies   • Ductal carcinoma in situ (DCIS) of left breast        Lymphedema     Row Name 04/15/19 1100             Subjective Pain    Able to rate subjective pain?  yes  -SC      Pre-Treatment Pain Level  2  -SC      Subjective Pain Comment  up to 3/10  -SC         Subjective Comments    Subjective Comments  I am feeling pretty good. Expanding, every Monday. I am tight but the pain is less. I have full mobility of my arm. Back to work no issues. Hoping to reconstruction in  or July. Otherwise doing great.   -SC         General ROM    GENERAL ROM COMMENTS  B shoulder WFLs  -SC         MMT (Manual Muscle Testing)    General MMT Comments  B shoulders WFLs  -SC         Lymphedema Edema Assessment    Edema Assessment Comment  negative  -SC         Skin Changes/Observations    Skin Observations Comment  incision well healed, currently expanding  -SC         Compression/Skin Care    Compression/Skin Care Comments  proper fitting bra  -SC        User Key  (r) = Recorded By, (t) = Taken By, (c) = Cosigned By    Initials Name Provider Type    Katja Newton, PT Physical Therapist                     [unfilled]    PT Assessment/Plan     Row Name 04/15/19 1117          PT Assessment    Assessment Comments  Ms. Praada returns for first f/u after initial evaluation, demonstrates full mobility of left shoulder with minimal pain with reconstruction, returned to work and negative limitations with strength. Due to good progress to return post reconstruction unless otherwise indicated.   -SC        PT Plan    PT Plan Comments  return in July/post reconstruction for reassessment unless otherwise indicated, DASH/goals  -SC       User Key  (r) = Recorded By, (t) = Taken By, (c) = Cosigned By    Initials Name Provider Type    SC Katja Lynne, PT Physical Therapist             Exercises     Row Name 04/15/19 1100             Subjective Comments    Subjective Comments  I am feeling pretty good. Expanding, every Monday. I am tight but the pain is less. I have full mobility of my arm. Back to work no issues. Hoping to reconstruction in June or July. Otherwise doing great.   -SC         Subjective Pain    Able to rate subjective pain?  yes  -SC      Pre-Treatment Pain Level  2  -SC      Subjective Pain Comment  up to 3/10  -SC        User Key  (r) = Recorded By, (t) = Taken By, (c) = Cosigned By    Initials Name Provider Type    SC Katja Lynne, PT Physical Therapist                      PT OP Goals     Row Name 04/15/19 1117          PT Short Term Goals    STG 1  Patient independent and compliant with initial Home Exercise Program focused on diaphragmatic breathing, flexibility, range of motion, mobility and strength for improved posture, function, with improved sleeping patterns and decreased pain/symptoms of upper extremity.   -SC     STG 1 Progress  Met  -SC     STG 2  Patient's PROM left shoulder flexion >/= 165 degrees for improved mobility, decreased axillary cording syndrome, decreased risk of edema, and improved posture, function for return to prior level of functioning.   -SC      STG 2 Progress  Met  -SC        Long Term Goals    LTG Date to Achieve  07/02/19  -SC     LTG 1  Patient independent and compliant with advanced Home Exercise Program for self-management of condition.   -SC     LTG 1 Progress  Progressing  -SC     LTG 2  Patient improved AROM left shoulder flexion in seated/standing positions >/=160 degrees for improved function in home and community for safety with return to prior level of functioning and transition to self-care of condition.   -SC     LTG 2 Progress  Met  -SC     LTG 3  Initiate introductory level training at community exercise program such as [employer] program to allow for transition to gym exercise program and self-care of condition.  -SC     LTG 3 Progress  Progressing  -SC     LTG 4  Patient score </=15/100 on DASH for improved function and transition to self-management of condition.   -SC     LTG 4 Progress  Progressing  -SC        Time Calculation    PT Goal Re-Cert Due Date  07/02/19  -SC       User Key  (r) = Recorded By, (t) = Taken By, (c) = Cosigned By    Initials Name Provider Type    Katja Newton, PT Physical Therapist          Therapy Education  Education Details: progression of reconstruction, early s/s of infection/lymphedema/shoulder issues (RTC, frozen shoulder), reasons to contact or return prior to reconstruction, instructed to discuss scar massage with plastic surgeon, bra fitting post reconstruction if indicated  Given: HEP, Symptoms/condition management, Pain management, Posture/body mechanics, Edema management, Mobility training, Other (comment)  Program: Reinforced, Progressed, Modified  How Provided: Verbal, Demonstration  Provided to: Patient, Other (comment)(daughters)  Level of Understanding: Teach back education performed, Verbalized, Demonstrated              Time Calculation:   Start Time: 1117  Stop Time: 1131  Time Calculation (min): 14 min   Therapy Charges for Today     Code Description Service Date Service  Provider Modifiers Qty    92493811831  PT THER PROC EA 15 MIN 4/15/2019 Katja Lynne, PT GP 1                    Katja Siddiqui, PT  4/15/2019

## 2019-04-17 ENCOUNTER — DOCUMENTATION (OUTPATIENT)
Dept: ONCOLOGY | Facility: CLINIC | Age: 54
End: 2019-04-17

## 2019-04-17 NOTE — PROGRESS NOTES
The OSW Intern attempted to make contact with the patient via telephone to discuss results of her distress questionnaire from 3/19 on which she scored 5/10. Patient was recently diagnosed with stage 0 breast cancer and underwent mastectomy and reconstruction.  Patient did not answer phone call. OSW Intern left voicemail explaining purpose of the call, as well as what we could be of support with, and the contact info for the OSW. The OSW will remain available as needs arise.    LUPE Morris  OSW Intern    [facility]  Oncology Social Worker

## 2019-04-22 ENCOUNTER — TELEPHONE (OUTPATIENT)
Dept: ONCOLOGY | Facility: CLINIC | Age: 54
End: 2019-04-22

## 2019-04-22 NOTE — TELEPHONE ENCOUNTER
----- Message from Vandana Shultz sent at 4/22/2019  3:29 PM EDT -----  [phone]  Needs a prescription called into  [facility]    Attempted to call pt. No answer, note did not indicate which script needed to be called in. Left VM to call back

## 2019-04-23 ENCOUNTER — TELEPHONE (OUTPATIENT)
Dept: ONCOLOGY | Facility: HOSPITAL | Age: 54
End: 2019-04-23

## 2019-04-23 RX ORDER — TAMOXIFEN CITRATE 20 MG/1
20 TABLET ORAL DAILY
Qty: 30 TABLET | Refills: 3 | Status: SHIPPED | OUTPATIENT
Start: 2019-04-23 | End: 2019-05-09

## 2019-04-23 NOTE — TELEPHONE ENCOUNTER
----- Message from Baylee Bailey MD sent at 4/23/2019  2:57 PM EDT -----  That is fine.  She is taking tamoxifen.  Baylee Bailey MD  ----- Message -----  From: Kristy Moscoso RN  Sent: 4/23/2019   9:21 AM  To: Baylee Bailey MD    Pt just requested her script for 20 mg of Tamoxifen daily. I sent it to her pharmacy. If this has changes please let me know.

## 2019-04-23 NOTE — TELEPHONE ENCOUNTER
Per Dr. Ni last dictation pt was to start 20 mg of Tamoxifen daily.Pt called to get the prescription. Script sent to pt's pharmacy and message sent to Dr. Bailey letting her know. Pt informed and had no further questions. She will call with any questions or concerns.

## 2019-04-23 NOTE — TELEPHONE ENCOUNTER
----- Message from Danette Avalos sent at 4/23/2019  9:10 AM EDT -----  Contact: [phone]  Pt needs tamoxifen called to her pharmacy

## 2019-05-09 ENCOUNTER — LAB (OUTPATIENT)
Dept: LAB | Facility: HOSPITAL | Age: 54
End: 2019-05-09

## 2019-05-09 ENCOUNTER — OFFICE VISIT (OUTPATIENT)
Dept: ONCOLOGY | Facility: CLINIC | Age: 54
End: 2019-05-09

## 2019-05-09 VITALS
HEIGHT: 59 IN | DIASTOLIC BLOOD PRESSURE: 71 MMHG | BODY MASS INDEX: 30.58 KG/M2 | WEIGHT: 151.7 LBS | HEART RATE: 80 BPM | RESPIRATION RATE: 16 BRPM | SYSTOLIC BLOOD PRESSURE: 123 MMHG | TEMPERATURE: 98.6 F | OXYGEN SATURATION: 98 %

## 2019-05-09 DIAGNOSIS — D05.10 DUCTAL CARCINOMA IN SITU (DCIS) OF BREAST, UNSPECIFIED LATERALITY: ICD-10-CM

## 2019-05-09 DIAGNOSIS — D05.12 DUCTAL CARCINOMA IN SITU (DCIS) OF LEFT BREAST: Primary | ICD-10-CM

## 2019-05-09 LAB
ALBUMIN SERPL-MCNC: 4.8 G/DL (ref 3.5–5.2)
ALBUMIN/GLOB SERPL: 1.8 G/DL (ref 1.1–2.4)
ALP SERPL-CCNC: 91 U/L (ref 38–116)
ALT SERPL W P-5'-P-CCNC: 23 U/L (ref 0–33)
ANION GAP SERPL CALCULATED.3IONS-SCNC: 11.1 MMOL/L
AST SERPL-CCNC: 21 U/L (ref 0–32)
BASOPHILS # BLD AUTO: 0.09 10*3/MM3 (ref 0–0.2)
BASOPHILS NFR BLD AUTO: 1.1 % (ref 0–1.5)
BILIRUB SERPL-MCNC: 0.5 MG/DL (ref 0.2–1.2)
BUN BLD-MCNC: 20 MG/DL (ref 6–20)
BUN/CREAT SERPL: 24.1 (ref 7.3–30)
CALCIUM SPEC-SCNC: 10.4 MG/DL (ref 8.5–10.2)
CHLORIDE SERPL-SCNC: 104 MMOL/L (ref 98–107)
CO2 SERPL-SCNC: 28.9 MMOL/L (ref 22–29)
CREAT BLD-MCNC: 0.83 MG/DL (ref 0.6–1.1)
DEPRECATED RDW RBC AUTO: 38.2 FL (ref 37–54)
EOSINOPHIL # BLD AUTO: 0.28 10*3/MM3 (ref 0–0.4)
EOSINOPHIL NFR BLD AUTO: 3.4 % (ref 0.3–6.2)
ERYTHROCYTE [DISTWIDTH] IN BLOOD BY AUTOMATED COUNT: 11.9 % (ref 12.3–15.4)
GFR SERPL CREATININE-BSD FRML MDRD: 72 ML/MIN/1.73
GLOBULIN UR ELPH-MCNC: 2.7 GM/DL (ref 1.8–3.5)
GLUCOSE BLD-MCNC: 102 MG/DL (ref 74–124)
HCT VFR BLD AUTO: 42 % (ref 34–46.6)
HGB BLD-MCNC: 14.1 G/DL (ref 12–15.9)
IMM GRANULOCYTES # BLD AUTO: 0.02 10*3/MM3 (ref 0–0.05)
IMM GRANULOCYTES NFR BLD AUTO: 0.2 % (ref 0–0.5)
LYMPHOCYTES # BLD AUTO: 1.94 10*3/MM3 (ref 0.7–3.1)
LYMPHOCYTES NFR BLD AUTO: 23.8 % (ref 19.6–45.3)
MCH RBC QN AUTO: 29.6 PG (ref 26.6–33)
MCHC RBC AUTO-ENTMCNC: 33.6 G/DL (ref 31.5–35.7)
MCV RBC AUTO: 88.2 FL (ref 79–97)
MONOCYTES # BLD AUTO: 0.54 10*3/MM3 (ref 0.1–0.9)
MONOCYTES NFR BLD AUTO: 6.6 % (ref 5–12)
NEUTROPHILS # BLD AUTO: 5.28 10*3/MM3 (ref 1.7–7)
NEUTROPHILS NFR BLD AUTO: 64.9 % (ref 42.7–76)
NRBC BLD AUTO-RTO: 0 /100 WBC (ref 0–0.2)
PLATELET # BLD AUTO: 380 10*3/MM3 (ref 140–450)
PMV BLD AUTO: 9 FL (ref 6–12)
POTASSIUM BLD-SCNC: 4.2 MMOL/L (ref 3.5–4.7)
PROT SERPL-MCNC: 7.5 G/DL (ref 6.3–8)
RBC # BLD AUTO: 4.76 10*6/MM3 (ref 3.77–5.28)
SODIUM BLD-SCNC: 144 MMOL/L (ref 134–145)
WBC NRBC COR # BLD: 8.15 10*3/MM3 (ref 3.4–10.8)

## 2019-05-09 PROCEDURE — 80053 COMPREHEN METABOLIC PANEL: CPT | Performed by: INTERNAL MEDICINE

## 2019-05-09 PROCEDURE — 99213 OFFICE O/P EST LOW 20 MIN: CPT | Performed by: INTERNAL MEDICINE

## 2019-05-09 PROCEDURE — 85025 COMPLETE CBC W/AUTO DIFF WBC: CPT | Performed by: INTERNAL MEDICINE

## 2019-05-09 PROCEDURE — 36415 COLL VENOUS BLD VENIPUNCTURE: CPT | Performed by: INTERNAL MEDICINE

## 2019-05-09 RX ORDER — TAMOXIFEN CITRATE 20 MG/1
20 TABLET ORAL DAILY
Qty: 90 TABLET | Refills: 2 | Status: SHIPPED | OUTPATIENT
Start: 2019-05-09 | End: 2019-08-07

## 2019-05-09 NOTE — PROGRESS NOTES
Subjective     REASON FOR FOLLOW-UP: Newly diagnosed DCIS, Tis N0, stage 0 DCIS.   Start date of tamoxifen: April 1, 2019.    History of Present Illness   Patient is a 53-year-old female with newly diagnosed DCIS, Tis N), stage 0 DCIS,with oncologic history as following. She has started taking Tamoxifen beginning of April and has been tolerating it well.     Oncologic history:  Patient is a 53-year-old female who works at [employer], she follows up with  Ashlee SHERMAN and also with LANE Hernandez at OB/GYN at [facility].  She had a routine mammogram    November 1, 2018: Had screening mammogram which showed calcifications in the right breast as well as on the left breast.  With prominent axillary lymph nodes in the left axilla.  Screening MMG with [place] ([facility]): Heterogeneously dense.  1. There is a focal asymmetry and calcifications seen in the posterior one-third central region of the right breast.  2. There is a focal asymmetry with associated calcifications seen in the middle one-third central region of the right breast.  3. There are calcifications seen in the posterior one-third superior region of the left breast.  4. There are prominent axillary lymph nodes in the left axilla.    Diagnostic mammogram bilateral, January 4, 2019  The right breast does not show any abnormality in the focal asymmetry does not persist in the right breast.       IMPRESSION:  1. Area in the right breast is benign-negative.  2. Calcifications in the middle one third of the left breast at 12 to 1:00, centrally located 7 cm from the nipple are suspicious. Biopsy is recommended.  3. Calcifications in the posterior one third 1:00 region of the left breast located 8 cm from the nipple are suspicious. Biopsy is recommended.  4. Fatty lymph nodes in the left axilla are suspicious. Aspiration is recommended.  5. Cluster of cysts in the subareolar region of the left breast is suspicious. Cyst aspiration is recommended.  6. Simple cysts  "in the left breast are benign-negative.    Biopsy, done January 28, 2019.  1/28/2019      Left breast, subareolar, Cyst Aspiration ([facility]):   Left subareolar breast tissue, ultrasound guided aspiration, thin prep:   No malignant cells identified. Cytologic features consistent with benign fibrocystic changes.  -1/4 cc of blood-tinged fluid was aspirated. The walls of the cyst collapsed. There was partial sonographic resolution. Concordant.     Left axillary lymph node FNA ([facility]):  -Procedure not performed, due to no suspicious lymph nodes on 1/28/19 exam.            Biopsy     The subareolar left breast cyst aspiration was negative for any malignant cells.  The left axillary lymph node biopsy was not performed as no suspicious lymph nodes were seen on exam.    January 28/2019, left breast stereotactic biopsy x2  The biopsy of the 12 to 1 o'clock position showed intermediate grade DCIS with central necrosis.  No invasive carcinoma identified.  1. Central Left breast Tissue, 12-1:00, \"tophat\" clip:  Intermediate Grade Cribriform Ductal Carcinoma in Situ with Central Necrosis, Microcalcification and Lobular Extension.   Largest focus of DCIS measuring 6 mm.   No invasive carcinoma identified.   Fibrofatty breast tissue also showing florid intraductal hyperplasia of the usual type, cystically dilated and ectatic ducts, patchy mild chronic stromal inflammation, and stromal microcalcifications.      Estrogen receptor 98.96%  Progesterone receptor 92.91%  Ki-67 3.18%.    2. Left Breast Tissue, 1:30, \"minicork\" clip:   Low to Intermediate Grade Cribriform and Solid Ductal Carcinoma in Situ with Central Necrosis, Microcalcifications and Lobular Extension.   Largest focus of DCIS measuring 4 mm.   No invasive carcinoma identified.   Fibrofatty breast tissue also showing columnar cell change, patchy mild chronic stromal inflammation and stromal microcalcifications.      ER+ (98.04%, strong)  RI+ (88.62%, strong)  Ki67 " 4.4%    2019:  MRI breast shows    IMPRESSION:  1. Biopsy-proven site of malignancy represented by the T-shaped metallic  clip seen at the 12-o'clock position in the left breast. There remains  clumped enhancement that as an aggregate measures on the order of 1.8 cm  that is seen immediately lateral to the T-shaped clip in the location of  calcifications seen mammographically. No evidence for left axillary  adenopathy is appreciated.  2. Biopsy-proven site of malignancy represented by a barrel-shaped  metallic clip in the posterior one-third of the left breast at the  1-o'clock position with clumped enhancement seen on the order of 1 cm  inferior to the metallic clip that measures on the order of 1.1 cm in  greatest dimension. Calcifications appear to be present mammographically  in this region. If further imaging evaluation is desired prior to  surgical therapy, correlation with mammography of the left breast is  suggested. No evidence for left axillary adenopathy is appreciated.  3. There are no findings suspicious for malignancy in the right breast.  4. Scattered enhancement of a benign parenchymal character is noted in  both breasts.\      Patient has family history of breast cancer in a maternal aunt in the late 60s.  Her sister had cervical cancer at age 28.  Father had lung cancer at age 62 and  with lung cancer at age 64.    Patient underwent genetic testing.  The InVitae genetic test is negative.    Patient underwent left mastectomy, skin sparing with sentinel lymph node biopsy on 2019.  She has reconstruction done by Dr. Onofre with placement of left breast prepectoral tissue expander.  And placement of acellular dermal matrix.    Pathology shows  Synoptic Checklist   DCIS OF THE BREAST   Breast DCIS - All Specimens   CLINICAL   Clinical History  Prior history of breast cancer    Radiologic Finding  Calcifications    SPECIMEN   Procedure  Total mastectomy    Specimen Laterality   "Left    TUMOR   Tumor Site  Upper outer quadrant      Upper inner quadrant    Histologic Type  Ductal carcinoma in situ    Size (Extent) of DCIS  Estimated size of DCIS greatest dimension in Millimeters (mm) is at least: Multifocal with one focus up to 45 mm, two up to 20 mm, and one  up to 23 mm. Millimeters (mm)   Number of Blocks with DCIS  14    Number of Blocks Examined  35    Architectural Patterns  [place]      Cribriform      Micropapillary      Solid    Nuclear Grade  Grade II (intermediate)    Accessory Findings     Necrosis  Present, central (expansive \"comedo\" necrosis)    Microcalcifications  Present in DCIS      Present in nonneoplastic tissue    MARGINS   Margins  Uninvolved by DCIS    Distance of DCIS from Anterior Margin in Millimeters (mm)  18 Millimeters (mm)   Distance of DCIS from Posterior Margin in Millimeters (mm)  15 Millimeters (mm)   Distance of DCIS from Superior Margin in Millimeters (mm)  20 Millimeters (mm)   Distance of DCIS from Inferior Margin in Millimeters (mm)  100 Millimeters (mm)   Distance of DCIS from Medial Margin in Millimeters (mm)  40 Millimeters (mm)   Distance of DCIS from Lateral Margin in Millimeters (mm)  50 Millimeters (mm)   Distance from Closest Margin in Millimeters (mm)  15 Millimeters (mm)   Closest Margin  Posterior    LYMPH NODES   Regional Lymph Nodes  Uninvolved by tumor cells    Number of Lymph Nodes Examined  2    Number of [place] Nodes Examined  2    PATHOLOGIC STAGE CLASSIFICATION (pTNM, AJCC 8th Edition)   TNM Descriptors  Not applicable    Primary Tumor (pT)  pTis (DCIS)    Regional Lymph Nodes (pN)          Patient is healing up from surgery, has a drain in place. Chemoprevention discussed with patient and decided on starting Tamoxifen after her surgery. After 2 years of tamoxifen we can switch her to aromatase inhibitor if postmenopausal.    Started Tamoxifen in April, 2019. No side effects as of today 05/09/2019    Past Medical History: "   Diagnosis Date   • Anxiety    • Breast cancer (CMS/HCC)     LEFT BREAST   • Carpal tunnel syndrome of right wrist    • Chest pain radiating to jaw     STRESS TEST OK 2018, IN EPIC - D/T STRESS, PANIC    • GERD (gastroesophageal reflux disease)    • H/O Septicemia (CMS/HCC) 2017    H/O Kidney stones   • Heart palpitations    • History of kidney stones    • Hyperlipidemia    • Hypertension         Past Surgical History:   Procedure Laterality Date   • BREAST BIOPSY Left    • BREAST RECONSTRUCTION Left 3/5/2019    Procedure: left prepectorasl placement of tissue expander with alloderm;  Surgeon: Abdirashid Ding MD;  Location: [facility];  Service: Plastics   •  SECTION  1994    x1   • EXTRACORPOREAL SHOCK WAVE LITHOTRIPSY (ESWL)     • KIDNEY STONE SURGERY     • MASTECTOMY W/ SENTINEL NODE BIOPSY Left 3/5/2019    Procedure: LEFT SKIN-SPARING MASTECTOMY WITH SENTINEL LYMPH NODE BIOPSY;  Surgeon: Vonnie Cat MD;  Location: [facility];  Service: General        Current Outpatient Medications on File Prior to Visit   Medication Sig Dispense Refill   • Acetaminophen (TYLENOL ARTHRITIS PAIN PO) Take 500 mg by mouth As Needed.     • acetaminophen (TYLENOL) 325 MG tablet Take 2 tablets by mouth Every 4 (Four) Hours As Needed for Mild Pain . 60 tablet 0   • atorvastatin (LIPITOR) 10 MG tablet Take 10 mg by mouth Every Night.     • Cholecalciferol (VITAMIN D) 1000 units tablet Take 1,000 Units by mouth Daily.     • hydrOXYzine (ATARAX) 25 MG tablet Take 1 tablet by mouth 3 (Three) Times a Day As Needed for Itching or Anxiety. 60 tablet 11   • ketotifen (ZADITOR) 0.025 % ophthalmic solution Administer 1 drop to both eyes 2 (Two) Times a Day. 1 each 5   • lisinopril-hydrochlorothiazide (PRINZIDE,ZESTORETIC) 20-25 MG per tablet Take 1 tablet by mouth Every Night.     • oxybutynin XL (DITROPAN-XL) 10 MG 24 hr tablet Take 10 mg by mouth As Needed.     • raNITIdine (ZANTAC) 150 MG tablet Take  150 mg by mouth Every Night.     • tamoxifen (NOLVADEX) 20 MG chemo tablet Take 1 tablet by mouth Daily. 30 tablet 3   • gabapentin (NEURONTIN) 100 MG capsule Take 1 capsule by mouth Every 8 (Eight) Hours. 60 capsule 1     No current facility-administered medications on file prior to visit.         ALLERGIES:    Allergies   Allergen Reactions   • Hydrocodone Itching   • Sulfa Antibiotics Itching     swelling      OB/GYN history:  Age of menstrual.,  12 years  Last menstrual.  Was 2018, patient is perimenopausal   5 para 4, 1 miscarriage.  Age of first childbirth is 29  She did breast-feed all of her kids.  At least for 6 months.  She was exposed to birth control pills for 7 years.  More recently she was on hormone treatment because of heavy.'s which now has been discontinued.      Social history she is an LPN at [employer].  She does not smoke.  She drinks alcohol once a week.  She is  for the last 22 years and has a child living with her who is 15 years old.      Family history: Father had lung cancer and  at age 64 with lung cancer was diagnosed at 62.  Mother is 72 in good health.  She has 1 brother 48 in good health.  She has 1 sister who had cervical cancer at age 28 but is in good health at 47.  She had a paternal aunt who had breast cancer in her late 60s.    Family History   Problem Relation Age of Onset   • Hypertension Mother    • Other Mother         Blood clots   • Emphysema Father    • Lung cancer Father 62   • Bone cancer Father 62   • Lupus Sister    • Cancer Sister 28        Cervical   • Other Brother         kidney stone   • Heart disease Maternal Grandfather 55   • Diabetes Maternal Grandfather    • Hypertension Maternal Grandfather    • Pancreatic cancer Paternal Grandmother 68   • Other Paternal Grandfather         kidney stone   • Stroke Maternal Grandmother    • Breast cancer Paternal Aunt         late 60's   • Ovarian cancer Sister 34   • Cancer Other    • Malig  "Hyperthermia Neg Hx         Review of Systems   Constitutional: Negative for appetite change, chills, diaphoresis, fatigue, fever and unexpected weight change.   HENT: Negative for hearing loss, sore throat and trouble swallowing.    Respiratory: Negative for cough, chest tightness, shortness of breath and wheezing.    Cardiovascular: Negative for chest pain, palpitations and leg swelling.   Gastrointestinal: Negative for abdominal distention, abdominal pain, constipation, diarrhea, nausea and vomiting.   Genitourinary: Negative for dysuria, frequency, hematuria and urgency.   Musculoskeletal: Negative for joint swelling.        No muscle weakness.   Skin: Negative for rash and wound.   Neurological: Negative for seizures, syncope, speech difficulty, weakness, numbness and headaches.   Hematological: Negative for adenopathy. Does not bruise/bleed easily.   Psychiatric/Behavioral: Negative for behavioral problems, confusion and suicidal ideas.   Patient has some pain in the left breast mastectomy site.  This is postsurgical in nature.    Objective     Vitals:    05/09/19 1133   BP: 123/71   Pulse: 80   Resp: 16   Temp: 98.6 °F (37 °C)   TempSrc: Oral   SpO2: 98%   Weight: 68.8 kg (151 lb 11.2 oz)   Height: 151 cm (59.45\")   PainSc: 0-No pain     Current Status 5/9/2019   ECOG score 0       Physical Exam        GENERAL:  Well-developed, well-nourished in no acute distress.   NECK:  Supple with good range of motion; no thyromegaly or masses, no JVD.  LYMPHATICS:  No cervical, supraclavicular, axillary or inguinal adenopathy.  CHEST:  Lungs clear to auscultation. Good airflow.  CARDIAC:  Regular rate and rhythm without murmurs, rubs or gallops. Normal S1,S2.  ABDOMEN:  Soft, nontender with no hepatosplenomegaly or masses.  EXTREMITIES:  No clubbing, cyanosis or edema.  NEUROLOGICAL:  Cranial Nerves II-XII grossly intact.  No focal neurological deficits.  PSYCHIATRIC:  Normal affect and mood.          RECENT " LABS:  Hematology WBC   Date Value Ref Range Status   05/09/2019 8.15 3.40 - 10.80 10*3/mm3 Final   12/10/2018 8.91 4.50 - 10.70 10*3/mm3 Final     RBC   Date Value Ref Range Status   05/09/2019 4.76 3.77 - 5.28 10*6/mm3 Final   12/10/2018 4.51 3.90 - 5.20 10*6/mm3 Final     Hemoglobin   Date Value Ref Range Status   05/09/2019 14.1 12.0 - 15.9 g/dL Final     Hematocrit   Date Value Ref Range Status   05/09/2019 42.0 34.0 - 46.6 % Final     Platelets   Date Value Ref Range Status   05/09/2019 380 140 - 450 10*3/mm3 Final      RADIOPHARMACEUTICAL INJECTION INTO THE LEFT BREAST FOR LEFT BREAST  SENTINEL NODE LOCALIZATION 03/05/2019  FINDINGS:  560 uCi technetium sulfur colloid was injected intradermally  in a circumareolar region of the left breast.    BILATERAL BREAST MRI  IMPRESSION:  1. Biopsy-proven site of malignancy represented by the T-shaped metallic  clip seen at the 12-o'clock position in the left breast. There remains  clumped enhancement that as an aggregate measures on the order of 1.8 cm  that is seen immediately lateral to the T-shaped clip in the location of  calcifications seen mammographically. No evidence for left axillary  adenopathy is appreciated.  2. Biopsy-proven site of malignancy represented by a barrel-shaped  metallic clip in the posterior one-third of the left breast at the  1-o'clock position with clumped enhancement seen on the order of 1 cm  inferior to the metallic clip that measures on the order of 1.1 cm in  greatest dimension. Calcifications appear to be present mammographically  in this region. If further imaging evaluation is desired prior to  surgical therapy, correlation with mammography of the left breast is  suggested. No evidence for left axillary adenopathy is appreciated.  3. There are no findings suspicious for malignancy in the right breast.  4. Scattered enhancement of a benign parenchymal character is noted in  both breasts.    Assessment/Plan     1.  Tis N0, stage 0  DCIS of the left breast status post left mastectomy with sentinel lymph nodes on March 5, 2019.  She is here for follow-up to discuss the risks versus the benefits of chemoprevention.  · Screening mammogram had shown focal asymmetry and calcifications in the posterior one third region of the right breast and also in the middle one third region of the right breast.  There is calcification in the posterior one third superior region of the left breast.  There are prominent axillary nodes in the left axilla.  · Diagnostic mammogram, January 4, 2019 shows right breast was negative.,  Calcifications in the left breast 12-1 o'clock position, 7 cm from the nipple, biopsy recommended, calcifications at 1 o'clock position left breast 8 cm from the nipple, biopsy recommended fatty lymph nodes in the left axilla, aspiration recommended.  Subareolar cyst aspiration suggested  · January 20, 2019 left breast cyst aspiration negative, left breast lymph node biopsy not done due to no suspicious lymph nodes on physical exam,        Left breast biopsy stereotactic x2, 12 to 1 o'clock position biopsy showedDCIS, with central necrosis, 6 mm ER +98%, AR +92%, Ki-67 3.18%, margins are clear  · Left breast biopsy at 130 position shows low to intermediate grade cribriform and solid ductal carcinoma in situ with central necrosis, largest focus 4 mm, no invasive carcinoma, ER 98%, AR 88%, Ki-67 4.4%, margins are clear. [place] lymph nodes negative.  · Patient is 2 weeks out of surgery and healing well. Discussed to start Tamoxifen once she is healed. She is willing to consider that after 2 years of tamoxifen we could potentially switch her to aromatase inhibitor therapy  · Labs show patient is per-menopausal.   · She started taking Tamoxifen in April, 2019 and has been tolerating it well. No hot flashes, joint pains or any other side effects. Discussed the side effects of Tamoxifen again today.   · Educated patient to see her Gyn annually to  monitor for risk of uterine cancer and to follow up with her GYN if she experiences any abnormal vaginal bleeding.     2.  Family history positive for cervical cancer in her sister who was 28 years old, breast cancer in paternal aunt in late 60s and lung cancer in her dad at age 62.  Genetic testing has been done. InVitae testing is negative for any mutations.    3.  History of multiple kidney stones, had 16 kidney stones, underwent 2 laser treatments and one lithotripsy so far.  She was told to not take calcium supplements.  She had sepsis with 1 of her kidney stones during admission to [facility].    4.  Mild vitamin D deficiency.    Plan   1. Patient is rowan-menopausal  2. Started tamoxifen 20 mg once daily in April and has been tolerating it well. After 2 years of tamoxifen we can switch her to aromatase inhibitor if postmenopausal.  3. Follow up with me in 6 months for toxicity check with labs.       I, Xiomara Bojorquez, acted as scribe for Baylee Bailey MD  in documenting the service or procedure. To the best of my knowledge, I recorded what was dictated by MD Baylee Otero MD CC- Carley Miller, Vonnie Schroeder MD

## 2019-06-03 ENCOUNTER — APPOINTMENT (OUTPATIENT)
Dept: PREADMISSION TESTING | Facility: HOSPITAL | Age: 54
End: 2019-06-03

## 2019-06-03 VITALS
SYSTOLIC BLOOD PRESSURE: 117 MMHG | RESPIRATION RATE: 16 BRPM | WEIGHT: 150.9 LBS | TEMPERATURE: 98.7 F | HEIGHT: 59 IN | HEART RATE: 79 BPM | DIASTOLIC BLOOD PRESSURE: 73 MMHG | BODY MASS INDEX: 30.42 KG/M2 | OXYGEN SATURATION: 98 %

## 2019-06-10 ENCOUNTER — ANESTHESIA EVENT (OUTPATIENT)
Dept: PERIOP | Facility: HOSPITAL | Age: 54
End: 2019-06-10

## 2019-06-11 ENCOUNTER — HOSPITAL ENCOUNTER (OUTPATIENT)
Facility: HOSPITAL | Age: 54
Setting detail: HOSPITAL OUTPATIENT SURGERY
Discharge: HOME | End: 2019-06-11
Attending: PLASTIC SURGERY | Admitting: PLASTIC SURGERY

## 2019-06-11 ENCOUNTER — ANESTHESIA (OUTPATIENT)
Dept: PERIOP | Facility: HOSPITAL | Age: 54
End: 2019-06-11

## 2019-06-11 VITALS
HEART RATE: 88 BPM | TEMPERATURE: 97.8 F | WEIGHT: 152.19 LBS | SYSTOLIC BLOOD PRESSURE: 127 MMHG | DIASTOLIC BLOOD PRESSURE: 78 MMHG | BODY MASS INDEX: 30.68 KG/M2 | OXYGEN SATURATION: 97 % | HEIGHT: 59 IN | RESPIRATION RATE: 18 BRPM

## 2019-06-11 DIAGNOSIS — Z85.3 HISTORY OF BREAST CANCER: ICD-10-CM

## 2019-06-11 PROCEDURE — 25010000002 HYDROMORPHONE PER 4 MG: Performed by: ANESTHESIOLOGY

## 2019-06-11 PROCEDURE — 25010000002 GENTAMICIN PER 80 MG: Performed by: PLASTIC SURGERY

## 2019-06-11 PROCEDURE — 25010000002 FENTANYL CITRATE (PF) 100 MCG/2ML SOLUTION: Performed by: ANESTHESIOLOGY

## 2019-06-11 PROCEDURE — 25010000003 CEFAZOLIN PER 500 MG: Performed by: PLASTIC SURGERY

## 2019-06-11 PROCEDURE — C1789 PROSTHESIS, BREAST, IMP: HCPCS | Performed by: PLASTIC SURGERY

## 2019-06-11 PROCEDURE — 25010000002 PHENYLEPHRINE PER 1 ML: Performed by: ANESTHESIOLOGY

## 2019-06-11 PROCEDURE — 25010000002 PROPOFOL 10 MG/ML EMULSION: Performed by: ANESTHESIOLOGY

## 2019-06-11 PROCEDURE — 25010000002 ONDANSETRON PER 1 MG: Performed by: ANESTHESIOLOGY

## 2019-06-11 PROCEDURE — 25010000002 NEOSTIGMINE PER 0.5 MG: Performed by: ANESTHESIOLOGY

## 2019-06-11 PROCEDURE — 81025 URINE PREGNANCY TEST: CPT | Performed by: PLASTIC SURGERY

## 2019-06-11 PROCEDURE — 88305 TISSUE EXAM BY PATHOLOGIST: CPT | Performed by: PLASTIC SURGERY

## 2019-06-11 PROCEDURE — 25010000002 MIDAZOLAM PER 1 MG: Performed by: ANESTHESIOLOGY

## 2019-06-11 PROCEDURE — 25010000002 DEXAMETHASONE PER 1 MG: Performed by: ANESTHESIOLOGY

## 2019-06-11 DEVICE — IMPLANTABLE DEVICE: Type: IMPLANTABLE DEVICE | Site: BREAST | Status: FUNCTIONAL

## 2019-06-11 DEVICE — BRST SIL NATRELLE INSPIRA SMOTH F/P 605CC: Type: IMPLANTABLE DEVICE | Site: BREAST | Status: FUNCTIONAL

## 2019-06-11 RX ORDER — ACETAMINOPHEN 325 MG/1
325 TABLET ORAL EVERY 4 HOURS PRN
Qty: 30 TABLET | Refills: 1 | Status: SHIPPED | OUTPATIENT
Start: 2019-06-11 | End: 2019-11-16

## 2019-06-11 RX ORDER — PROMETHAZINE HYDROCHLORIDE 25 MG/ML
12.5 INJECTION, SOLUTION INTRAMUSCULAR; INTRAVENOUS ONCE AS NEEDED
Status: DISCONTINUED | OUTPATIENT
Start: 2019-06-11 | End: 2019-06-11 | Stop reason: HOSPADM

## 2019-06-11 RX ORDER — PROPOFOL 10 MG/ML
VIAL (ML) INTRAVENOUS AS NEEDED
Status: DISCONTINUED | OUTPATIENT
Start: 2019-06-11 | End: 2019-06-11 | Stop reason: SURG

## 2019-06-11 RX ORDER — OXYCODONE HYDROCHLORIDE 5 MG/1
10 TABLET ORAL ONCE
Status: COMPLETED | OUTPATIENT
Start: 2019-06-11 | End: 2019-06-11

## 2019-06-11 RX ORDER — ROCURONIUM BROMIDE 10 MG/ML
INJECTION, SOLUTION INTRAVENOUS AS NEEDED
Status: DISCONTINUED | OUTPATIENT
Start: 2019-06-11 | End: 2019-06-11 | Stop reason: SURG

## 2019-06-11 RX ORDER — FENTANYL CITRATE 50 UG/ML
INJECTION, SOLUTION INTRAMUSCULAR; INTRAVENOUS AS NEEDED
Status: DISCONTINUED | OUTPATIENT
Start: 2019-06-11 | End: 2019-06-11 | Stop reason: SURG

## 2019-06-11 RX ORDER — DIPHENHYDRAMINE HYDROCHLORIDE 50 MG/ML
12.5 INJECTION INTRAMUSCULAR; INTRAVENOUS
Status: DISCONTINUED | OUTPATIENT
Start: 2019-06-11 | End: 2019-06-11 | Stop reason: HOSPADM

## 2019-06-11 RX ORDER — ONDANSETRON 2 MG/ML
INJECTION INTRAMUSCULAR; INTRAVENOUS AS NEEDED
Status: DISCONTINUED | OUTPATIENT
Start: 2019-06-11 | End: 2019-06-11 | Stop reason: SURG

## 2019-06-11 RX ORDER — SODIUM CHLORIDE 0.9 % (FLUSH) 0.9 %
1-10 SYRINGE (ML) INJECTION AS NEEDED
Status: DISCONTINUED | OUTPATIENT
Start: 2019-06-11 | End: 2019-06-11 | Stop reason: HOSPADM

## 2019-06-11 RX ORDER — FENTANYL CITRATE 50 UG/ML
50 INJECTION, SOLUTION INTRAMUSCULAR; INTRAVENOUS
Status: DISCONTINUED | OUTPATIENT
Start: 2019-06-11 | End: 2019-06-11 | Stop reason: HOSPADM

## 2019-06-11 RX ORDER — HYDRALAZINE HYDROCHLORIDE 20 MG/ML
5 INJECTION INTRAMUSCULAR; INTRAVENOUS
Status: DISCONTINUED | OUTPATIENT
Start: 2019-06-11 | End: 2019-06-11 | Stop reason: HOSPADM

## 2019-06-11 RX ORDER — ACETAMINOPHEN 325 MG/1
650 TABLET ORAL ONCE AS NEEDED
Status: DISCONTINUED | OUTPATIENT
Start: 2019-06-11 | End: 2019-06-11 | Stop reason: HOSPADM

## 2019-06-11 RX ORDER — SODIUM CHLORIDE, SODIUM LACTATE, POTASSIUM CHLORIDE, CALCIUM CHLORIDE 600; 310; 30; 20 MG/100ML; MG/100ML; MG/100ML; MG/100ML
125 INJECTION, SOLUTION INTRAVENOUS ONCE
Status: COMPLETED | OUTPATIENT
Start: 2019-06-11 | End: 2019-06-11

## 2019-06-11 RX ORDER — ONDANSETRON HYDROCHLORIDE 8 MG/1
8 TABLET, FILM COATED ORAL ONCE
Status: COMPLETED | OUTPATIENT
Start: 2019-06-11 | End: 2019-06-11

## 2019-06-11 RX ORDER — LIDOCAINE HYDROCHLORIDE 20 MG/ML
INJECTION, SOLUTION INFILTRATION; PERINEURAL AS NEEDED
Status: DISCONTINUED | OUTPATIENT
Start: 2019-06-11 | End: 2019-06-11 | Stop reason: SURG

## 2019-06-11 RX ORDER — ACETAMINOPHEN 500 MG
1000 TABLET ORAL ONCE
Status: COMPLETED | OUTPATIENT
Start: 2019-06-11 | End: 2019-06-11

## 2019-06-11 RX ORDER — OXYCODONE HYDROCHLORIDE AND ACETAMINOPHEN 5; 325 MG/1; MG/1
1 TABLET ORAL ONCE AS NEEDED
Status: DISCONTINUED | OUTPATIENT
Start: 2019-06-11 | End: 2019-06-11 | Stop reason: HOSPADM

## 2019-06-11 RX ORDER — PROMETHAZINE HYDROCHLORIDE 25 MG/ML
6.25 INJECTION, SOLUTION INTRAMUSCULAR; INTRAVENOUS
Status: DISCONTINUED | OUTPATIENT
Start: 2019-06-11 | End: 2019-06-11 | Stop reason: HOSPADM

## 2019-06-11 RX ORDER — HYDROMORPHONE HYDROCHLORIDE 1 MG/ML
0.5 INJECTION, SOLUTION INTRAMUSCULAR; INTRAVENOUS; SUBCUTANEOUS
Status: DISCONTINUED | OUTPATIENT
Start: 2019-06-11 | End: 2019-06-11 | Stop reason: HOSPADM

## 2019-06-11 RX ORDER — DOXYCYCLINE 100 MG/1
100 CAPSULE ORAL 2 TIMES DAILY
Qty: 6 CAPSULE | Refills: 0 | Status: SHIPPED | OUTPATIENT
Start: 2019-06-11 | End: 2019-06-14

## 2019-06-11 RX ORDER — LIDOCAINE HYDROCHLORIDE 20 MG/ML
INJECTION, SOLUTION INFILTRATION; PERINEURAL AS NEEDED
Status: DISCONTINUED | OUTPATIENT
Start: 2019-06-11 | End: 2019-06-11

## 2019-06-11 RX ORDER — GLYCOPYRROLATE 0.2 MG/ML
INJECTION INTRAMUSCULAR; INTRAVENOUS AS NEEDED
Status: DISCONTINUED | OUTPATIENT
Start: 2019-06-11 | End: 2019-06-11 | Stop reason: SURG

## 2019-06-11 RX ORDER — GABAPENTIN 300 MG/1
300 CAPSULE ORAL ONCE
Status: COMPLETED | OUTPATIENT
Start: 2019-06-11 | End: 2019-06-11

## 2019-06-11 RX ORDER — FAMOTIDINE 10 MG/ML
20 INJECTION, SOLUTION INTRAVENOUS ONCE
Status: COMPLETED | OUTPATIENT
Start: 2019-06-11 | End: 2019-06-11

## 2019-06-11 RX ORDER — OXYCODONE HYDROCHLORIDE 5 MG/1
5-10 TABLET ORAL EVERY 4 HOURS PRN
Qty: 20 TABLET | Refills: 0 | Status: SHIPPED | OUTPATIENT
Start: 2019-06-11 | End: 2019-06-20

## 2019-06-11 RX ORDER — DEXAMETHASONE SODIUM PHOSPHATE 10 MG/ML
INJECTION INTRAMUSCULAR; INTRAVENOUS AS NEEDED
Status: DISCONTINUED | OUTPATIENT
Start: 2019-06-11 | End: 2019-06-11 | Stop reason: SURG

## 2019-06-11 RX ORDER — NALOXONE HCL 0.4 MG/ML
0.2 VIAL (ML) INJECTION AS NEEDED
Status: DISCONTINUED | OUTPATIENT
Start: 2019-06-11 | End: 2019-06-11 | Stop reason: HOSPADM

## 2019-06-11 RX ORDER — LABETALOL HYDROCHLORIDE 5 MG/ML
5 INJECTION, SOLUTION INTRAVENOUS
Status: DISCONTINUED | OUTPATIENT
Start: 2019-06-11 | End: 2019-06-11 | Stop reason: HOSPADM

## 2019-06-11 RX ORDER — ONDANSETRON 4 MG/1
4 TABLET, FILM COATED ORAL EVERY 6 HOURS PRN
Qty: 10 TABLET | Refills: 1 | Status: SHIPPED | OUTPATIENT
Start: 2019-06-11 | End: 2020-06-10

## 2019-06-11 RX ORDER — DOCUSATE SODIUM 250 MG
250 CAPSULE ORAL 2 TIMES DAILY PRN
Qty: 15 CAPSULE | Refills: 1 | Status: SHIPPED | OUTPATIENT
Start: 2019-06-11 | End: 2019-11-16

## 2019-06-11 RX ORDER — MIDAZOLAM HYDROCHLORIDE 1 MG/ML
1 INJECTION INTRAMUSCULAR; INTRAVENOUS
Status: DISCONTINUED | OUTPATIENT
Start: 2019-06-11 | End: 2019-06-11 | Stop reason: HOSPADM

## 2019-06-11 RX ORDER — PROMETHAZINE HYDROCHLORIDE 25 MG/1
25 SUPPOSITORY RECTAL ONCE AS NEEDED
Status: DISCONTINUED | OUTPATIENT
Start: 2019-06-11 | End: 2019-06-11 | Stop reason: HOSPADM

## 2019-06-11 RX ORDER — LIDOCAINE HYDROCHLORIDE 10 MG/ML
0.5 INJECTION, SOLUTION EPIDURAL; INFILTRATION; INTRACAUDAL; PERINEURAL ONCE AS NEEDED
Status: DISCONTINUED | OUTPATIENT
Start: 2019-06-11 | End: 2019-06-11 | Stop reason: HOSPADM

## 2019-06-11 RX ORDER — EPHEDRINE SULFATE 50 MG/ML
INJECTION, SOLUTION INTRAVENOUS AS NEEDED
Status: DISCONTINUED | OUTPATIENT
Start: 2019-06-11 | End: 2019-06-11 | Stop reason: SURG

## 2019-06-11 RX ORDER — GABAPENTIN 100 MG/1
100 CAPSULE ORAL EVERY 8 HOURS
Qty: 60 CAPSULE | Refills: 1 | Status: SHIPPED | OUTPATIENT
Start: 2019-06-11 | End: 2019-06-20

## 2019-06-11 RX ORDER — SODIUM CHLORIDE, SODIUM LACTATE, POTASSIUM CHLORIDE, CALCIUM CHLORIDE 600; 310; 30; 20 MG/100ML; MG/100ML; MG/100ML; MG/100ML
9 INJECTION, SOLUTION INTRAVENOUS CONTINUOUS
Status: DISCONTINUED | OUTPATIENT
Start: 2019-06-11 | End: 2019-06-11 | Stop reason: HOSPADM

## 2019-06-11 RX ORDER — EPHEDRINE SULFATE 50 MG/ML
5 INJECTION, SOLUTION INTRAVENOUS ONCE AS NEEDED
Status: DISCONTINUED | OUTPATIENT
Start: 2019-06-11 | End: 2019-06-11 | Stop reason: HOSPADM

## 2019-06-11 RX ORDER — FLUMAZENIL 0.1 MG/ML
0.2 INJECTION INTRAVENOUS AS NEEDED
Status: DISCONTINUED | OUTPATIENT
Start: 2019-06-11 | End: 2019-06-11 | Stop reason: HOSPADM

## 2019-06-11 RX ORDER — DIPHENHYDRAMINE HCL 25 MG
25 CAPSULE ORAL
Status: DISCONTINUED | OUTPATIENT
Start: 2019-06-11 | End: 2019-06-11 | Stop reason: HOSPADM

## 2019-06-11 RX ORDER — PROMETHAZINE HYDROCHLORIDE 25 MG/1
25 TABLET ORAL ONCE AS NEEDED
Status: DISCONTINUED | OUTPATIENT
Start: 2019-06-11 | End: 2019-06-11 | Stop reason: HOSPADM

## 2019-06-11 RX ORDER — CHLORHEXIDINE GLUCONATE 4 G/100ML
SOLUTION TOPICAL ONCE
COMMUNITY
End: 2019-06-11 | Stop reason: HOSPADM

## 2019-06-11 RX ORDER — ONDANSETRON 2 MG/ML
4 INJECTION INTRAMUSCULAR; INTRAVENOUS ONCE AS NEEDED
Status: DISCONTINUED | OUTPATIENT
Start: 2019-06-11 | End: 2019-06-11 | Stop reason: HOSPADM

## 2019-06-11 RX ORDER — MIDAZOLAM HYDROCHLORIDE 1 MG/ML
2 INJECTION INTRAMUSCULAR; INTRAVENOUS
Status: DISCONTINUED | OUTPATIENT
Start: 2019-06-11 | End: 2019-06-11 | Stop reason: HOSPADM

## 2019-06-11 RX ADMIN — PHENYLEPHRINE HYDROCHLORIDE 50 MCG: 10 INJECTION INTRAVENOUS at 09:52

## 2019-06-11 RX ADMIN — NEOSTIGMINE METHYLSULFATE 2 MG: 1 INJECTION INTRAMUSCULAR; INTRAVENOUS; SUBCUTANEOUS at 10:35

## 2019-06-11 RX ADMIN — EPHEDRINE SULFATE 10 MG: 50 INJECTION INTRAMUSCULAR; INTRAVENOUS; SUBCUTANEOUS at 08:45

## 2019-06-11 RX ADMIN — SODIUM CHLORIDE, POTASSIUM CHLORIDE, SODIUM LACTATE AND CALCIUM CHLORIDE: 600; 310; 30; 20 INJECTION, SOLUTION INTRAVENOUS at 07:35

## 2019-06-11 RX ADMIN — PHENYLEPHRINE HYDROCHLORIDE 50 MCG: 10 INJECTION INTRAVENOUS at 08:13

## 2019-06-11 RX ADMIN — DEXAMETHASONE SODIUM PHOSPHATE 10 MG: 10 INJECTION INTRAMUSCULAR; INTRAVENOUS at 07:49

## 2019-06-11 RX ADMIN — PROPOFOL 150 MG: 10 INJECTION, EMULSION INTRAVENOUS at 07:42

## 2019-06-11 RX ADMIN — FENTANYL CITRATE 50 MCG: 50 INJECTION INTRAMUSCULAR; INTRAVENOUS at 07:57

## 2019-06-11 RX ADMIN — EPHEDRINE SULFATE 5 MG: 50 INJECTION INTRAMUSCULAR; INTRAVENOUS; SUBCUTANEOUS at 09:52

## 2019-06-11 RX ADMIN — LIDOCAINE HYDROCHLORIDE 60 MG: 20 INJECTION, SOLUTION INFILTRATION; PERINEURAL at 07:42

## 2019-06-11 RX ADMIN — FAMOTIDINE 20 MG: 10 INJECTION INTRAVENOUS at 07:27

## 2019-06-11 RX ADMIN — ONDANSETRON 8 MG: 8 TABLET, FILM COATED ORAL at 06:15

## 2019-06-11 RX ADMIN — SODIUM CHLORIDE, POTASSIUM CHLORIDE, SODIUM LACTATE AND CALCIUM CHLORIDE: 600; 310; 30; 20 INJECTION, SOLUTION INTRAVENOUS at 08:55

## 2019-06-11 RX ADMIN — DOXYCYCLINE 200 MG: 100 INJECTION, POWDER, LYOPHILIZED, FOR SOLUTION INTRAVENOUS at 07:35

## 2019-06-11 RX ADMIN — GLYCOPYRROLATE 0.4 MG: 0.2 INJECTION INTRAMUSCULAR; INTRAVENOUS at 10:35

## 2019-06-11 RX ADMIN — ROCURONIUM BROMIDE 30 MG: 10 INJECTION INTRAVENOUS at 07:43

## 2019-06-11 RX ADMIN — PHENYLEPHRINE HYDROCHLORIDE 100 MCG: 10 INJECTION INTRAVENOUS at 08:00

## 2019-06-11 RX ADMIN — HYDROMORPHONE HYDROCHLORIDE 0.5 MG: 1 INJECTION, SOLUTION INTRAMUSCULAR; INTRAVENOUS; SUBCUTANEOUS at 11:15

## 2019-06-11 RX ADMIN — OXYCODONE HYDROCHLORIDE 10 MG: 5 TABLET ORAL at 06:15

## 2019-06-11 RX ADMIN — SODIUM CHLORIDE, POTASSIUM CHLORIDE, SODIUM LACTATE AND CALCIUM CHLORIDE: 600; 310; 30; 20 INJECTION, SOLUTION INTRAVENOUS at 10:30

## 2019-06-11 RX ADMIN — OXYCODONE HYDROCHLORIDE AND ACETAMINOPHEN 1 TABLET: 5; 325 TABLET ORAL at 11:18

## 2019-06-11 RX ADMIN — FENTANYL CITRATE 100 MCG: 50 INJECTION INTRAMUSCULAR; INTRAVENOUS at 07:41

## 2019-06-11 RX ADMIN — ACETAMINOPHEN 1000 MG: 500 TABLET, FILM COATED ORAL at 06:15

## 2019-06-11 RX ADMIN — GABAPENTIN 300 MG: 300 CAPSULE ORAL at 06:15

## 2019-06-11 RX ADMIN — ONDANSETRON 4 MG: 2 INJECTION INTRAMUSCULAR; INTRAVENOUS at 07:49

## 2019-06-11 RX ADMIN — MIDAZOLAM 2 MG: 1 INJECTION INTRAMUSCULAR; INTRAVENOUS at 07:28

## 2019-06-11 RX ADMIN — SODIUM CHLORIDE, POTASSIUM CHLORIDE, SODIUM LACTATE AND CALCIUM CHLORIDE 125 ML/HR: 600; 310; 30; 20 INJECTION, SOLUTION INTRAVENOUS at 06:10

## 2019-06-11 NOTE — ANESTHESIA POSTPROCEDURE EVALUATION
"Patient: Mason Parada    Procedure Summary     Date:  06/11/19 Room / Location:  [facility] OR  / [facility] MAIN OR    Anesthesia Start:  0735 Anesthesia Stop:  1056    Procedures:       LEFT REMOVAL OF TISSUE EXPANDER AND PLACEMENT OF IMPLANT RIGHT AUGMENTATION RIGHT MASTOPEXY FOR SYMMETRY (Bilateral Breast)      FAT GRAFTING (Left ) Diagnosis:      Surgeon:  Abdirashid Ding MD Provider:  Nazario Rosales MD    Anesthesia Type:  general ASA Status:  2          Anesthesia Type: general  Last vitals  BP   122/77 (06/11/19 1130)   Temp   36.6 °C (97.8 °F) (06/11/19 1130)   Pulse   89 (06/11/19 1130)   Resp   18 (06/11/19 1130)     SpO2   95 % (06/11/19 1130)     Post Anesthesia Care and Evaluation    Patient location during evaluation: PHASE II  Patient participation: complete - patient participated  Level of consciousness: awake and alert  Pain management: adequate  Airway patency: patent  Anesthetic complications: No anesthetic complications    Cardiovascular status: acceptable  Respiratory status: acceptable  Hydration status: acceptable    Comments: /77   Pulse 89   Temp 36.6 °C (97.8 °F) (Oral)   Resp 18   Ht 149.9 cm (59\")   Wt 69 kg (152 lb 3 oz)   SpO2 95%   BMI 30.74 kg/m²       "

## 2019-06-11 NOTE — ADDENDUM NOTE
Addendum  created 06/11/19 1412 by Kasi Kim MD    Review and Sign - Ready for Procedure, Sign clinical note

## 2019-06-11 NOTE — ANESTHESIA PROCEDURE NOTES
Airway  Urgency: elective    Airway not difficult    General Information and Staff    Patient location during procedure: OR  Anesthesiologist: Nazario Rosales MD    Indications and Patient Condition  Indications for airway management: airway protection    Preoxygenated: yes  MILS maintained throughout  Mask difficulty assessment: 1 - vent by mask    Final Airway Details  Final airway type: endotracheal airway      Successful airway: ETT    Successful intubation technique: direct laryngoscopy  Facilitating devices/methods: intubating stylet  Endotracheal tube insertion site: oral  Blade: [place]  Blade size: 3  ETT size (mm): 6.5  Cormack-Lehane Classification: grade IIa - partial view of glottis  Placement verified by: chest auscultation and capnometry   Measured from: teeth  ETT to teeth (cm): 21  Number of attempts at approach: 1

## 2019-06-11 NOTE — ANESTHESIA PREPROCEDURE EVALUATION
Anesthesia Evaluation     Patient summary reviewed   no history of anesthetic complications:  NPO Solid Status: > 8 hours  NPO Liquid Status: > 4 hours           Airway   Mallampati: II  TM distance: >3 FB  Neck ROM: full  Dental - normal exam     Pulmonary - negative pulmonary ROS and normal exam    breath sounds clear to auscultation  Cardiovascular - normal exam  Exercise tolerance: good (4-7 METS)    Rhythm: regular  Rate: normal    (+) hypertension well controlled, hyperlipidemia,       Neuro/Psych- negative ROS  GI/Hepatic/Renal/Endo    (+)  GERD well controlled,      Musculoskeletal (-) negative ROS    Abdominal  - normal exam   Substance History - negative use     OB/GYN negative ob/gyn ROS         Other      history of cancer active                    Anesthesia Plan    ASA 2     general     intravenous induction   Anesthetic plan, all risks, benefits, and alternatives have been provided, discussed and informed consent has been obtained with: patient and spouse/significant other.

## 2019-06-11 NOTE — OP NOTE
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  Left breast removal of tissue expander with placement of permanent breast prosthesis, Allergan SCM-605cc cohesive silicone implant  2.  Autologous fat grafting 45 cc to the left breast  3.  Augmentation and mastopexy to the right breast for symmetry, Allergan  cc silicone implant    Surgeon: ANTONIA Ding MD    Assistant: Katerin Billy    Anesthesia: General    Estimated Blood Loss: 20    Specimens: Right breast tissue, history of breast cancer    Complications: None    Indications: Ms. Parada has completed her tissue expansion to the left breast after mastectomy and prepectoral construction.  She has completed waiting period and is ready to proceed with exchange.  We did discuss the contour irregularity at the superior and medial position of the left implant which can be treated with autologous fat grafting.  Given her asymmetry with a ptotic right breast we discussed augmentation and mastopexy to the right side to improve symmetry.  We discussed the use of silicone implants in both breasts and augmentation in the subglandular position on the right to preserve the benefit of the prepectoral positioning of the left implant.    We discussed risks, benefits and alternatives including but no limited to: bleeding, infection, asymmetry, poor or slow wound healing, need for further surgery, possible recurrence.  The patient elected to proceed.    Description of Procedure: The patient was met in the preoperative holding area.  All questions were answered and informed consent was assured.      She was marked in standing position and breast landmarks were marked.  A circum-vertical mastopexy was designed on the right breast with the nipple position 1 cm below the level of the most projecting portion of the left breast to account for nipple areolar elevation from the  augmentation.  Topographical map was drawn and the areas needed for fat grafting on the left breast in the areas for harvest in the abdomen.    After induction of appropriate anesthesia, a timeout was performed correctly identifying the patient, operative site, and procedure to be performed.  All present were in agreement.    The chest and abdomen were prepped and draped in a sterile fashion.  A 7 cm IM fold incision was made on the left breast and the dissection carried down to the tissue expander capsule.  There was no periprosthetic fluid and there was excellent integration of the AlloDerm.  The tissue expander was emptied and removed and the capsule examined.  Superior medial capsulotomy was performed and a lateral popcorn capsulorrhaphy performed.  The pocket was irrigated with triple antibiotic solution and the sizer placed and filled to 605 cc.    Tumescent solution was infiltrated in the abdomen and left to dwell for several minutes.    Right breast was then addressed in a 5 cm IM fold incision was made.  The superficial fascia of the breast was preserved the dissection then carried down to the pectoralis muscle.  The pec fascia was elevated and a pocket matching the dimensions of the preferred 190 cc implant was made.  The pocket was made hemostatic and irrigated with triple antibiotic solution and the sizer was placed and filled to 190 cc.  She was then placed in a sitting position and volume symmetry appeared adequate.  The right breast was tailor tacked along the circum-vertical marks to ensure appropriate mastopexy.    She was replaced in the supine position and suction assisted lipectomy was performed of the abdomen and fat collected with the revolve device.  The fat was washed 3 times with warm lactated Ringer solution transferred to 5 cc syringes.  Fat was infiltrated in small aliquots in the marked topographical regions of the left breast.  45 cc of fat was infiltrated.    The sizers were then  removed and the pockets again examined for hemostasis and found to be hemostatic and then irrigated with copious triple antibiotic solution.  50% Betadine solution was then instilled into the pockets and left to dwell for several minutes.  Gloves were changed and the chest was reprepped and draped.  The 605 cc and 190 cc implants were brought onto the field and triple antibiotic solution was placed into the packaging.  The pockets were rinsed clear with triple antibiotic solution and the 190 cc implant was placed into the pocket via no touch technique with a Lance funnel and then the 605 cc into the left breast pocket with a Lance funnel.    The breast pockets were closed with 2-0 Vicryl in the capsule of the left breast and the superficial fascia of the right and the deep dermal layer was closed with 3-0 Monocryl in the intracuticular layer closed with 4-0 Monocryl.  She was again placed in a sitting position and the marks again checked and found to be adequate.  She was laid in a supine position and the mastopexy was performed.  36 mm circumference incision was made around the nipple areolar complex and a superior pedicle was designed de-epithelialized.  The vertical pillars were designed and a wedge of breast tissue was excised from this area with care taken to not undermine the nipple areolar complex.  The vertical pillar was closed with 2-0 PDS suture and the nipple inset with 3-0 Monocryl and 3-0 Monocryl used to close the deep dermal layer.  Intracuticular was then closed with 4-0 Monocryl.    All the incisions were dressed with Dermabond and Tegaderms and the liposuction sites were closed with a single 4-0 fast gut suture.  She was placed in a well-padded Ace wrap and abdominal binder.    The patient was then aroused from anesthesia with ease and transferred to the postoperative care area in good condition. All sponge, needle, and instrument counts were correct.

## 2019-06-12 LAB
CYTO UR: NORMAL
LAB AP CASE REPORT: NORMAL
PATH REPORT.FINAL DX SPEC: NORMAL
PATH REPORT.GROSS SPEC: NORMAL

## 2019-06-20 ENCOUNTER — OFFICE VISIT (OUTPATIENT)
Dept: SURGERY | Facility: CLINIC | Age: 54
End: 2019-06-20

## 2019-06-20 VITALS
DIASTOLIC BLOOD PRESSURE: 82 MMHG | HEART RATE: 80 BPM | HEIGHT: 59 IN | OXYGEN SATURATION: 99 % | BODY MASS INDEX: 30.64 KG/M2 | WEIGHT: 152 LBS | SYSTOLIC BLOOD PRESSURE: 122 MMHG

## 2019-06-20 DIAGNOSIS — Z12.39 BREAST SCREENING: ICD-10-CM

## 2019-06-20 DIAGNOSIS — D05.12 DUCTAL CARCINOMA IN SITU (DCIS) OF LEFT BREAST: Primary | ICD-10-CM

## 2019-06-20 PROCEDURE — 99213 OFFICE O/P EST LOW 20 MIN: CPT | Performed by: SURGERY

## 2019-06-20 NOTE — PROGRESS NOTES
[facility]     Referring Provider: Julia Whitlock MD     Chief complaint: Routine followup DCIS     HPI:   2/4/19:  Ms. Mason Parada is a 52 yo woman, seen at the request of Dr. Julia Whitlock, for a new diagnosis of left breast DCIS. This was initially detected as an imaging abnormality. Her work-up is detailed in the oncologic history below. She denies any breast lumps, pain, skin changes, or nipple discharge. She denies any prior history of abnormal mammograms or breast biopsies. She has a family history of breast cancer in a paternal aunt, diagnosed in her late 60's. She has a family history of ovarian cancer in her sister, diagnosed at age 34.      3/21/19:  She underwent left mastectomy & SLNB with TE reconstruction on 3/5/19. See surgery & pathology details below in oncologic history. She has been recovering well and has no complaints. She has already seen Dr. Ding back and had one drain removed. She has seen Dr. Bailey and discussed chemoprevention. She will be starting tamoxifen in a few weeks.     6/20/19, Interval History:  She returns today for scheduled follow-up. I have reviewed the interval records since her last visit. She underwent left implant exchange and right augmentation and mastopexy with Dr. Ding on 6/11/19. She has been recovering from this well. She is seeing Dr. Bailey and has continued on tamoxifen for chemoprevention. She is tolerating this reasonably well. She has occasional headaches and restless legs in the evening, which she is not sure whether or not is attributed to the tamoxifen or her other medications. She is considering switching the timing of her tamoxifen dose to the morning. She is still being followed by physical therapy. She denies any additional complaints. She was by herself in clinic today.        Oncology/Hematology History    2008, Screening MMG: Negative, per pt report.         Ductal carcinoma in situ (DCIS) of left breast    10/31/2018 Initial  Diagnosis     Ductal carcinoma in situ (DCIS) of left breast         11/1/2018 Imaging     Screening MMG with [facility] ([facility]): Heterogeneously dense.  1. There is a focal asymmetry and calcifications seen in the posterior one-third central region of the right breast.  2. There is a focal asymmetry with associated calcifications seen in the middle one-third central region of the right breast.  3. There are calcifications seen in the posterior one-third superior region of the left breast.  4. There are prominent axillary lymph nodes in the left axilla.  BI-RADS 0: Incomplete.         1/4/2019 Imaging     Bilateral Diagnostic MMG with [facility] & Bilateral US ([facility]):   MMG: Heterogeneously dense.  1. On the present examination, focal asymmetry in the right breast, central does not persist. The asymmetry noted on the recent screening mammogram resolves into stroma on additional views.  2. There are multiple amorphous and indistinct calcifications with segmental distribution in the middle one-third of the left breast at 12-1 o'clock, central located 7 cm from the nipple. These span approximately 2 cm. The asymmetry noted on the recent mammogram resolves into stroma on additional views.  3. There are several punctate and indistinct calcifications with linear distribution in the posterior one-third 1:30 o'clock region of the left breast located 8 cm from the nipple. These span approximately 1 cm.  4. There are a few small axillary lymph nodes in the left axilla.  US:  1. In the central, posterior one third region of the right breast, there is no evidence of any solid mass or abnormal cystic elements.  2. In the central, middle one-third of the left breast at 12-1 o'clock, there is no discrete solid or suspicious sonographic abnormalities.   4. Ultrasound is suggestive of a few fatty lymph nodes. The largest measures 19 x 8 x 12 mm. This demonstrates prominent cortex measuring up to 3 mm. The remaining smaller nodes demonstrate normal  "morphology and size.  5. There is an oval cluster of cyst with partially defined margins measuring 10 x 4 x 7 mm seen in the sub-areolar region of the left breast. Internal echotexture is heterogeneous.   6. There are a few small simple cyst seen in the left breast. These measure less than 1 cm.  IMPRESSION:  1. Area in the right breast is benign-negative.  2. Calcifications in the middle one third of the left breast at 12 to 1:00, centrally located 7 cm from the nipple are suspicious. Biopsy is recommended.  3. Calcifications in the posterior one third 1:00 region of the left breast located 8 cm from the nipple are suspicious. Biopsy is recommended.  4. Fatty lymph nodes in the left axilla are suspicious. Aspiration is recommended.  5. Cluster of cysts in the subareolar region of the left breast is suspicious. Cyst aspiration is recommended.  6. Simple cysts in the left breast are benign-negative.  BI-RADS 4B: Suspicious.          1/28/2019 Biopsy     Left breast, subareolar, Cyst Aspiration ([facility]):   Left subareolar breast tissue, ultrasound guided aspiration, thin prep:   No malignant cells identified. Cytologic features consistent with benign fibrocystic changes.  -1/4 cc of blood-tinged fluid was aspirated. The walls of the cyst collapsed. There was partial sonographic resolution. Concordant.    Left axillary lymph node FNA ([facility]):  -Procedure not performed, due to no suspicious lymph nodes on 1/28/19 exam.         1/28/2019 Biopsy     Left breast, Stereotactic Biopsy x 2 ([facility]):     1. Central Left breast Tissue, 12-1:00, \"tophat\" clip:  Intermediate Grade Cribriform Ductal Carcinoma in Situ with Central Necrosis, Microcalcification and Lobular Extension.   Largest focus of DCIS measuring 6 mm.   No invasive carcinoma identified.   Fibrofatty breast tissue also showing florid intraductal hyperplasia of the usual type, cystically dilated and ectatic ducts, patchy mild chronic stromal inflammation, and stromal " "microcalcifications.     ER+ (98.96%, strong)  OR+ 92.91%, strong)  Ki67 3.18%    2. Left Breast Tissue, 1:30, \"minicork\" clip:   Low to Intermediate Grade Cribriform and Solid Ductal Carcinoma in Situ with Central Necrosis, Microcalcifications and Lobular Extension.   Largest focus of DCIS measuring 4 mm.   No invasive carcinoma identified.   Fibrofatty breast tissue also showing columnar cell change, patchy mild chronic stromal inflammation and stromal microcalcifications.     ER+ (98.04%, strong)  OR+ (88.62%, strong)  Ki67 4.4%         1/31/2019 Imaging     Breast MRI ( [facility]):   Within the middle third of the left breast at the 12-o'clock position on the order of 5.5 cm from the nipple there is a metallic clip seen within a postbiopsy cavity that measures on the order of 1.4 cm in the superior to inferior dimension, 3.2 cm in anterior to posterior dimension and 3.1 cm in medial to lateral dimension. A metallic clip is seen within the central portion of the cavity. The metallic clip represents the T-shaped metallic clip seen on the postbiopsy mammogram. This represents a biopsy-proven site of malignancy. Clumped enhancement that has an aggregate measures on the order of 1.8 cm in superior to inferior dimension, 1.4 cm in anterior to posterior dimension and 1.3 cm in the medial to lateral dimension, as seen along the lateral margin of the T-shaped metallic clip. The postbiopsy mammogram is somewhat difficult to evaluate due to postbiopsy change, but calcifications are seen within this region.  In the posterior one-third of the upper quadrant of the left breast centered at the 1-o'clock position on the order of 7.7 cm from the nipple there is a metallic clip which represents the barrel-shaped metallic clip. This metallic clip is located on the order of 1.3 cm superior to clumped enhancement that measures on the order of 1.1 cm in anterior to posterior dimension, 0.7 cm in the superior to inferior dimension and 0.7 " cm in the medial to lateral dimension. This represents a biopsy-proven site of malignancy. Mammographically on the postbiopsy mammogram, there is a suggestion of some microcalcifications seen inferior to this barrel-shaped metallic clip that appear to correspond to the enhancement seen in this region. No other areas of abnormal enhancement or morphology are seen within the left breast. There is no evidence for left axillary adenopathy. There are no findings suspicious for malignancy in the right breast.  BI-RADS 6: Known biopsy-proven malignancy.          2/4/2019 Genetic Testing     [employer] Common Hereditary Cancers & Breast Cancer STAT Panels (47 genes):  Negative         3/5/2019 Surgery     Left skin-sparing mastectomy with SLNB & prepectoral TE reconstruction     1. Lymph Node, [place] Node #1 (two levels):  Single benign lymph node.     2. Lymph Node, [place] Node  #2, Excision (two levels):  Single benign lymph node.     3. Breast, Simple Mastectomy, Skin Sparing: Multifocal ductal carcinoma in-situ.               A. The ductal carcinoma in-situ is of intermediate nuclear grade.               B. The ductal carcinoma in-situ has comedo, micropapillary, solid and cribriform architecture.               C. The ductal carcinoma in-situ has comedo type expansive necrosis.               D. The ductal carcinoma in-situ is multifocal with the largest confluent area measuring 45 mm.                    Additional separate foci include two foci up to 20 mm , and one up to 23 mm (ductal                   carcinoma is in fourteen of thirty-one blocks (14/35).               F. The ductal carcinoma in-situ comes within 20 mm of the superior margin, 100 mm of the inferior margin, 40                    mm of the medial margin, 50 mm of the lateral margin, 18 mm of the anterior margin and 15 mm of the posterior margin (closest margin posterior 15 mm away).               F. Hormone receptors were performed on prior tissue and  were ER and MA positive.         4/1/2019 -  Hormonal Therapy     Tamoxifen 20 mg daily as chemoprevention         6/11/2019 Surgery     1.  Left breast removal of tissue expander with placement of permanent breast prosthesis, Allergan SCM-605cc cohesive silicone implant  2.  Autologous fat grafting 45 cc to the left breast  3.  Augmentation and mastopexy to the right breast for symmetry, Allergan  cc silicone implant            Review of Systems:  See interval history.       Medications:    Current Outpatient Medications:   •  Acetaminophen (TYLENOL ARTHRITIS PAIN PO), Take 500 mg by mouth 2 (Two) Times a Day., Disp: , Rfl:   •  acetaminophen (TYLENOL) 325 MG tablet, Take 1 tablet by mouth Every 4 (Four) Hours As Needed for Mild Pain ., Disp: 30 tablet, Rfl: 1  •  atorvastatin (LIPITOR) 10 MG tablet, Take 10 mg by mouth Every Night., Disp: , Rfl:   •  Cholecalciferol (VITAMIN D) 1000 units tablet, Take 1,000 Units by mouth Daily. HOLDING FOR SURGERY, Disp: , Rfl:   •  docusate sodium (COLACE) 250 MG capsule, Take 1 capsule by mouth 2 (Two) Times a Day As Needed for Constipation., Disp: 15 capsule, Rfl: 1  •  hydrOXYzine (ATARAX) 25 MG tablet, Take 1 tablet by mouth 3 (Three) Times a Day As Needed for Itching or Anxiety. (Patient taking differently: Take 25 mg by mouth At Night As Needed for Itching or Anxiety.), Disp: 60 tablet, Rfl: 11  •  ketotifen (ZADITOR) 0.025 % ophthalmic solution, Administer 1 drop to both eyes 2 (Two) Times a Day., Disp: 1 each, Rfl: 5  •  lisinopril-hydrochlorothiazide (PRINZIDE,ZESTORETIC) 20-25 MG per tablet, Take 1 tablet by mouth Every Night., Disp: , Rfl:   •  ondansetron (ZOFRAN) 4 MG tablet, Take 1 tablet by mouth Every 6 (Six) Hours As Needed for Nausea or Vomiting., Disp: 10 tablet, Rfl: 1  •  oxybutynin XL (DITROPAN-XL) 10 MG 24 hr tablet, Take 10 mg by mouth As Needed., Disp: , Rfl:   •  raNITIdine (ZANTAC) 150 MG tablet, Take 150 mg by mouth Every Night., Disp: , Rfl:    •  tamoxifen (NOLVADEX) 20 MG chemo tablet, Take 1 tablet by mouth Daily for 90 days. (Patient taking differently: Take 20 mg by mouth Daily. PATIENT TO ASK DR. DING IF NEEDING TO HOLD), Disp: 90 tablet, Rfl: 2      Allergies   Allergen Reactions   • Hydrocodone Itching   • Sulfa Antibiotics Itching     swelling       Family History   Problem Relation Age of Onset   • Hypertension Mother    • Other Mother         Blood clots   • Emphysema Father    • Lung cancer Father 62   • Bone cancer Father 62   • Lupus Sister    • Cancer Sister 28        Cervical   • Other Brother         kidney stone   • Heart disease Maternal Grandfather 55   • Diabetes Maternal Grandfather    • Hypertension Maternal Grandfather    • Pancreatic cancer Paternal Grandmother 68   • Other Paternal Grandfather         kidney stone   • Stroke Maternal Grandmother    • Breast cancer Paternal Aunt         late 60's   • Ovarian cancer Sister 34   • Cancer Other    • Malig Hyperthermia Neg Hx        PHYSICAL EXAMINATION:   Vitals:    06/20/19 1007   BP: 122/82   Pulse: 80   SpO2: 99%     ECOG 0 - Asymptomatic  General: NAD, well appearing  Psych: a&o x 3, normal mood and affect  Eyes: EOMI, no scleral icterus  ENMT: neck supple without masses or thyromegaly, mucus membranes moist  MSK: normal gait, normal ROM in bilateral shoulders  Lymph nodes: no cervical, supraclavicular or axillary lymphadenopathy  Breast:   Right: Well-healing lollipop mastopexy incision with implant in place.  Left: Sp mastectomy. Well-healing IMF incision and well-healed central mastectomy scar with implant in place..      Assessment:  53 y.o. F with a diagnosis of multifocal left breast DCIS: intermediate grade, ER/[place]+. She is sp left [facility] & SLNB on 3/5/29, pTis (largest focus 4.5 cm). She is on tamoxifen for chemoprevention. She underwent left implant exchange and right augmentation and mastopexy with Dr. Ding on 6/11/2019.    Plan:  -Continue f/u with   [facility].  -Continue f/u with Dr. Bailey.   -Continue f/u with physical therapy.  -F/u in 11/2019 with right screening MMG.  -She was instructed to call sooner with any questions, concerns or changes on BSE.    Vonnie Cat MD      CC:  MD Ashlee Bergeron, LANE Maloney MD

## 2019-06-25 ENCOUNTER — TELEPHONE (OUTPATIENT)
Dept: SURGERY | Facility: CLINIC | Age: 54
End: 2019-06-25

## 2019-06-25 NOTE — TELEPHONE ENCOUNTER
STEPHG is scheduled on 11/18/2019 @ 10:00am.    F/u appt. With Dr. Cat is scheduled on 11/25/2019 @ 11:00am.    Called and spoke to patient to confirm appointment times, patient expressed verbal understanding of appointment times.    Sent patient a reminder letter in the mail.

## 2019-07-07 DIAGNOSIS — H10.13 ALLERGIC CONJUNCTIVITIS OF BOTH EYES: ICD-10-CM

## 2019-07-09 RX ORDER — KETOTIFEN FUMARATE 0.35 MG/ML
SOLUTION/ DROPS OPHTHALMIC
Qty: 1 EACH | Refills: 4 | Status: SHIPPED | OUTPATIENT
Start: 2019-07-09 | End: 2020-01-13 | Stop reason: SDUPTHER

## 2019-08-15 ENCOUNTER — CLINICAL SUPPORT (OUTPATIENT)
Dept: FAMILY MEDICINE CLINIC | Facility: CLINIC | Age: 54
End: 2019-08-15

## 2019-08-15 DIAGNOSIS — Z23 IMMUNIZATION DUE: Primary | ICD-10-CM

## 2019-08-26 PROCEDURE — 90750 HZV VACC RECOMBINANT IM: CPT | Performed by: NURSE PRACTITIONER

## 2019-08-26 PROCEDURE — 90471 IMMUNIZATION ADMIN: CPT | Performed by: NURSE PRACTITIONER

## 2019-10-10 ENCOUNTER — DOCUMENTATION (OUTPATIENT)
Dept: PHYSICAL THERAPY | Facility: HOSPITAL | Age: 54
End: 2019-10-10

## 2019-10-10 DIAGNOSIS — M25.612 DECREASED RANGE OF MOTION OF LEFT SHOULDER: ICD-10-CM

## 2019-10-10 DIAGNOSIS — M25.512 ACUTE PAIN OF LEFT SHOULDER: ICD-10-CM

## 2019-10-10 DIAGNOSIS — Z91.89 AT RISK FOR LYMPHEDEMA: ICD-10-CM

## 2019-10-10 DIAGNOSIS — Z42.1 AFTERCARE POSTMASTECTOMY FOR BREAST RECONSTRUCTION: ICD-10-CM

## 2019-10-10 DIAGNOSIS — D05.12 DUCTAL CARCINOMA IN SITU (DCIS) OF LEFT BREAST: Primary | ICD-10-CM

## 2019-10-10 DIAGNOSIS — Z90.12 S/P LEFT MASTECTOMY: ICD-10-CM

## 2019-10-10 DIAGNOSIS — M25.612 STIFF SHOULDER, LEFT: ICD-10-CM

## 2019-10-10 DIAGNOSIS — Z48.89 AFTERCARE FOLLOWING SURGERY: ICD-10-CM

## 2019-10-10 DIAGNOSIS — I10 ESSENTIAL HYPERTENSION: ICD-10-CM

## 2019-10-10 NOTE — THERAPY DISCHARGE NOTE
Outpatient Physical Therapy Discharge Summary         Patient Name: Mason Parada  : 1965  MRN: [id number]    Today's Date: 10/10/2019    Visit Dx:    ICD-10-CM ICD-9-CM   1. Ductal carcinoma in situ (DCIS) of left breast D05.12 233.0   2. S/P left mastectomy Z90.12 V45.71   3. At risk for lymphedema Z91.89 V49.89   4. Decreased range of motion of left shoulder M25.612 719.51   5. Acute pain of left shoulder M25.512 719.41   6. Stiff shoulder, left M25.612 719.51   7. Aftercare postmastectomy for breast reconstruction Z42.1 V51.0   8. Aftercare following surgery Z48.89 V58.89   9. Essential hypertension I10 401.9       PT OP Goals     Row Name 10/10/19 1400          PT Short Term Goals    STG 1  Patient independent and compliant with initial Home Exercise Program focused on diaphragmatic breathing, flexibility, range of motion, mobility and strength for improved posture, function, with improved sleeping patterns and decreased pain/symptoms of upper extremity.   -SC     STG 1 Progress  Met  -SC     STG 2  Patient's PROM left shoulder flexion >/= 165 degrees for improved mobility, decreased axillary cording syndrome, decreased risk of edema, and improved posture, function for return to prior level of functioning.   -SC     STG 2 Progress  Met  -SC        Long Term Goals    LTG 1  Patient independent and compliant with advanced Home Exercise Program for self-management of condition.   -SC     LTG 1 Progress  Met  -SC     LTG 2  Patient improved AROM left shoulder flexion in seated/standing positions >/=160 degrees for improved function in home and community for safety with return to prior level of functioning and transition to self-care of condition.   -SC     LTG 2 Progress  Met  -SC     LTG 3  Initiate introductory level training at community exercise program such as [employer] [facility] program to allow for transition to gym exercise program and self-care of condition.  -SC     LTG 3 Progress  Not Met  -SC      LTG 4  Patient score </=15/100 on DASH for improved function and transition to self-management of condition.   -SC     LTG 4 Progress  Partially Met  -SC       User Key  (r) = Recorded By, (t) = Taken By, (c) = Cosigned By    Initials Name Provider Type    Katja Newton, PT Physical Therapist          OP PT Discharge Summary  Date of Discharge: 10/10/19  Reason for Discharge: Patient/Caregiver request  Outcomes Achieved: Patient able to partially acheive established goals  Discharge Destination: Home with home program            Katja Siddiqui, PT  10/10/2019

## 2019-11-15 ENCOUNTER — LAB (OUTPATIENT)
Dept: LAB | Facility: HOSPITAL | Age: 54
End: 2019-11-15

## 2019-11-15 ENCOUNTER — OFFICE VISIT (OUTPATIENT)
Dept: ONCOLOGY | Facility: CLINIC | Age: 54
End: 2019-11-15

## 2019-11-15 VITALS
WEIGHT: 154.4 LBS | BODY MASS INDEX: 31.12 KG/M2 | OXYGEN SATURATION: 99 % | HEART RATE: 77 BPM | SYSTOLIC BLOOD PRESSURE: 137 MMHG | HEIGHT: 59 IN | TEMPERATURE: 98.3 F | DIASTOLIC BLOOD PRESSURE: 88 MMHG | RESPIRATION RATE: 16 BRPM

## 2019-11-15 DIAGNOSIS — Z90.12 STATUS POST MASTECTOMY, LEFT: ICD-10-CM

## 2019-11-15 DIAGNOSIS — Z87.442 HISTORY OF KIDNEY STONES: ICD-10-CM

## 2019-11-15 DIAGNOSIS — Z80.49 FAMILY HISTORY OF CERVICAL CANCER: ICD-10-CM

## 2019-11-15 DIAGNOSIS — D05.12 DUCTAL CARCINOMA IN SITU (DCIS) OF LEFT BREAST: ICD-10-CM

## 2019-11-15 DIAGNOSIS — M16.12 OSTEOARTHRITIS OF LEFT HIP, UNSPECIFIED OSTEOARTHRITIS TYPE: ICD-10-CM

## 2019-11-15 DIAGNOSIS — E55.9 VITAMIN D DEFICIENCY: ICD-10-CM

## 2019-11-15 DIAGNOSIS — D05.12 DUCTAL CARCINOMA IN SITU (DCIS) OF LEFT BREAST: Primary | ICD-10-CM

## 2019-11-15 LAB
ALBUMIN SERPL-MCNC: 4.5 G/DL (ref 3.5–5.2)
ALBUMIN/GLOB SERPL: 1.7 G/DL (ref 1.1–2.4)
ALP SERPL-CCNC: 75 U/L (ref 38–116)
ALT SERPL W P-5'-P-CCNC: 23 U/L (ref 0–33)
ANION GAP SERPL CALCULATED.3IONS-SCNC: 12.6 MMOL/L (ref 5–15)
AST SERPL-CCNC: 17 U/L (ref 0–32)
BASOPHILS # BLD AUTO: 0.06 10*3/MM3 (ref 0–0.2)
BASOPHILS NFR BLD AUTO: 0.9 % (ref 0–1.5)
BILIRUB SERPL-MCNC: 0.4 MG/DL (ref 0.2–1.2)
BUN BLD-MCNC: 15 MG/DL (ref 6–20)
BUN/CREAT SERPL: 20 (ref 7.3–30)
CALCIUM SPEC-SCNC: 9.6 MG/DL (ref 8.5–10.2)
CHLORIDE SERPL-SCNC: 102 MMOL/L (ref 98–107)
CO2 SERPL-SCNC: 26.4 MMOL/L (ref 22–29)
CREAT BLD-MCNC: 0.75 MG/DL (ref 0.6–1.1)
DEPRECATED RDW RBC AUTO: 39.4 FL (ref 37–54)
EOSINOPHIL # BLD AUTO: 0.28 10*3/MM3 (ref 0–0.4)
EOSINOPHIL NFR BLD AUTO: 4 % (ref 0.3–6.2)
ERYTHROCYTE [DISTWIDTH] IN BLOOD BY AUTOMATED COUNT: 12 % (ref 12.3–15.4)
GFR SERPL CREATININE-BSD FRML MDRD: 81 ML/MIN/1.73
GLOBULIN UR ELPH-MCNC: 2.7 GM/DL (ref 1.8–3.5)
GLUCOSE BLD-MCNC: 108 MG/DL (ref 74–124)
HCT VFR BLD AUTO: 41.9 % (ref 34–46.6)
HGB BLD-MCNC: 14 G/DL (ref 12–15.9)
IMM GRANULOCYTES # BLD AUTO: 0.04 10*3/MM3 (ref 0–0.05)
IMM GRANULOCYTES NFR BLD AUTO: 0.6 % (ref 0–0.5)
LYMPHOCYTES # BLD AUTO: 1.93 10*3/MM3 (ref 0.7–3.1)
LYMPHOCYTES NFR BLD AUTO: 27.8 % (ref 19.6–45.3)
MCH RBC QN AUTO: 29.7 PG (ref 26.6–33)
MCHC RBC AUTO-ENTMCNC: 33.4 G/DL (ref 31.5–35.7)
MCV RBC AUTO: 89 FL (ref 79–97)
MONOCYTES # BLD AUTO: 0.46 10*3/MM3 (ref 0.1–0.9)
MONOCYTES NFR BLD AUTO: 6.6 % (ref 5–12)
NEUTROPHILS # BLD AUTO: 4.16 10*3/MM3 (ref 1.7–7)
NEUTROPHILS NFR BLD AUTO: 60.1 % (ref 42.7–76)
NRBC BLD AUTO-RTO: 0 /100 WBC (ref 0–0.2)
PLATELET # BLD AUTO: 299 10*3/MM3 (ref 140–450)
PMV BLD AUTO: 8.8 FL (ref 6–12)
POTASSIUM BLD-SCNC: 3.9 MMOL/L (ref 3.5–4.7)
PROT SERPL-MCNC: 7.2 G/DL (ref 6.3–8)
RBC # BLD AUTO: 4.71 10*6/MM3 (ref 3.77–5.28)
SODIUM BLD-SCNC: 141 MMOL/L (ref 134–145)
WBC NRBC COR # BLD: 6.93 10*3/MM3 (ref 3.4–10.8)

## 2019-11-15 PROCEDURE — 80053 COMPREHEN METABOLIC PANEL: CPT

## 2019-11-15 PROCEDURE — 36415 COLL VENOUS BLD VENIPUNCTURE: CPT

## 2019-11-15 PROCEDURE — G0463 HOSPITAL OUTPT CLINIC VISIT: HCPCS | Performed by: INTERNAL MEDICINE

## 2019-11-15 PROCEDURE — 85025 COMPLETE CBC W/AUTO DIFF WBC: CPT

## 2019-11-15 PROCEDURE — 99214 OFFICE O/P EST MOD 30 MIN: CPT | Performed by: INTERNAL MEDICINE

## 2019-11-15 RX ORDER — TAMOXIFEN CITRATE 20 MG/1
TABLET ORAL
COMMUNITY
Start: 2019-09-23 | End: 2020-05-12

## 2019-11-15 NOTE — PROGRESS NOTES
Subjective     REASON FOR FOLLOW-UP: Newly diagnosed DCIS, Tis N0, stage 0 DCIS.   Start date of tamoxifen: April 1, 2019.  · S/p left mastectomy    History of Present Illness   Patient is a 53-year-old female with newly diagnosed DCIS, Tis N), stage 0 DCIS,with oncologic history as following. She has started taking Tamoxifen beginning of April and has been tolerating it well.     Patient reports feeling well overall.  She has some bone pain.  She was out of Tamoxifen for a week and her headaches returned then.  When she restarted Tamoxifen, her bone pain started.  She has some pain in her hips as well.  Overall, patient is tolerating Tamoxifen well. Her hot flashes have resolved.      Patient's labs from 11/15/19 are normal.      Oncologic history:  Patient is a 53-year-old female who works at [employer], she follows up with  Ashlee SHERMAN and also with LANE Hernandez at OB/GYN at [facility].  She had a routine mammogram    November 1, 2018: Had screening mammogram which showed calcifications in the right breast as well as on the left breast.  With prominent axillary lymph nodes in the left axilla.  Screening MMG with [facility] ([facility]): Heterogeneously dense.  1. There is a focal asymmetry and calcifications seen in the posterior one-third central region of the right breast.  2. There is a focal asymmetry with associated calcifications seen in the middle one-third central region of the right breast.  3. There are calcifications seen in the posterior one-third superior region of the left breast.  4. There are prominent axillary lymph nodes in the left axilla.    Diagnostic mammogram bilateral, January 4, 2019  The right breast does not show any abnormality in the focal asymmetry does not persist in the right breast.       IMPRESSION:  1. Area in the right breast is benign-negative.  2. Calcifications in the middle one third of the left breast at 12 to 1:00, centrally located 7 cm from the nipple are suspicious.  "Biopsy is recommended.  3. Calcifications in the posterior one third 1:00 region of the left breast located 8 cm from the nipple are suspicious. Biopsy is recommended.  4. Fatty lymph nodes in the left axilla are suspicious. Aspiration is recommended.  5. Cluster of cysts in the subareolar region of the left breast is suspicious. Cyst aspiration is recommended.  6. Simple cysts in the left breast are benign-negative.    Biopsy, done January 28, 2019.  1/28/2019      Left breast, subareolar, Cyst Aspiration ([facility]):   Left subareolar breast tissue, ultrasound guided aspiration, thin prep:   No malignant cells identified. Cytologic features consistent with benign fibrocystic changes.  -1/4 cc of blood-tinged fluid was aspirated. The walls of the cyst collapsed. There was partial sonographic resolution. Concordant.     Left axillary lymph node FNA ([facility]):  -Procedure not performed, due to no suspicious lymph nodes on 1/28/19 exam.            Biopsy     The subareolar left breast cyst aspiration was negative for any malignant cells.  The left axillary lymph node biopsy was not performed as no suspicious lymph nodes were seen on exam.    January 28/2019, left breast stereotactic biopsy x2  The biopsy of the 12 to 1 o'clock position showed intermediate grade DCIS with central necrosis.  No invasive carcinoma identified.  1. Central Left breast Tissue, 12-1:00, \"tophat\" clip:  Intermediate Grade Cribriform Ductal Carcinoma in Situ with Central Necrosis, Microcalcification and Lobular Extension.   Largest focus of DCIS measuring 6 mm.   No invasive carcinoma identified.   Fibrofatty breast tissue also showing florid intraductal hyperplasia of the usual type, cystically dilated and ectatic ducts, patchy mild chronic stromal inflammation, and stromal microcalcifications.      Estrogen receptor 98.96%  Progesterone receptor 92.91%  Ki-67 3.18%.    2. Left Breast Tissue, 1:30, \"minicork\" clip:   Low to Intermediate Grade " Cribriform and Solid Ductal Carcinoma in Situ with Central Necrosis, Microcalcifications and Lobular Extension.   Largest focus of DCIS measuring 4 mm.   No invasive carcinoma identified.   Fibrofatty breast tissue also showing columnar cell change, patchy mild chronic stromal inflammation and stromal microcalcifications.      ER+ (98.04%, strong)  SD+ (88.62%, strong)  Ki67 4.4%    2019:  MRI breast shows    IMPRESSION:  1. Biopsy-proven site of malignancy represented by the T-shaped metallic  clip seen at the 12-o'clock position in the left breast. There remains  clumped enhancement that as an aggregate measures on the order of 1.8 cm  that is seen immediately lateral to the T-shaped clip in the location of  calcifications seen mammographically. No evidence for left axillary  adenopathy is appreciated.  2. Biopsy-proven site of malignancy represented by a barrel-shaped  metallic clip in the posterior one-third of the left breast at the  1-o'clock position with clumped enhancement seen on the order of 1 cm  inferior to the metallic clip that measures on the order of 1.1 cm in  greatest dimension. Calcifications appear to be present mammographically  in this region. If further imaging evaluation is desired prior to  surgical therapy, correlation with mammography of the left breast is  suggested. No evidence for left axillary adenopathy is appreciated.  3. There are no findings suspicious for malignancy in the right breast.  4. Scattered enhancement of a benign parenchymal character is noted in  both breasts.\      Patient has family history of breast cancer in a maternal aunt in the late 60s.  Her sister had cervical cancer at age 28.  Father had lung cancer at age 62 and  with lung cancer at age 64.    Patient underwent genetic testing.  The Navidogitae genetic test is negative.    Patient underwent left mastectomy, skin sparing with sentinel lymph node biopsy on 2019.  She has reconstruction  "done by Dr. Onofre with placement of left breast prepectoral tissue expander.  And placement of acellular dermal matrix.    Pathology shows  Synoptic Checklist   DCIS OF THE BREAST   Breast DCIS - All Specimens   CLINICAL   Clinical History  Prior history of breast cancer    Radiologic Finding  Calcifications    SPECIMEN   Procedure  Total mastectomy    Specimen Laterality  Left    TUMOR   Tumor Site  Upper outer quadrant      Upper inner quadrant    Histologic Type  Ductal carcinoma in situ    Size (Extent) of DCIS  Estimated size of DCIS greatest dimension in Millimeters (mm) is at least: Multifocal with one focus up to 45 mm, two up to 20 mm, and one  up to 23 mm. Millimeters (mm)   Number of Blocks with DCIS  14    Number of Blocks Examined  35    Architectural Patterns  Comedo      Cribriform      Micropapillary      Solid    Nuclear Grade  Grade II (intermediate)    Accessory Findings     Necrosis  Present, central (expansive \"comedo\" necrosis)    Microcalcifications  Present in DCIS      Present in nonneoplastic tissue    MARGINS   Margins  Uninvolved by DCIS    Distance of DCIS from Anterior Margin in Millimeters (mm)  18 Millimeters (mm)   Distance of DCIS from Posterior Margin in Millimeters (mm)  15 Millimeters (mm)   Distance of DCIS from Superior Margin in Millimeters (mm)  20 Millimeters (mm)   Distance of DCIS from Inferior Margin in Millimeters (mm)  100 Millimeters (mm)   Distance of DCIS from Medial Margin in Millimeters (mm)  40 Millimeters (mm)   Distance of DCIS from Lateral Margin in Millimeters (mm)  50 Millimeters (mm)   Distance from Closest Margin in Millimeters (mm)  15 Millimeters (mm)   Closest Margin  Posterior    LYMPH NODES   Regional Lymph Nodes  Uninvolved by tumor cells    Number of Lymph Nodes Examined  2    Number of Webster Nodes Examined  2    PATHOLOGIC STAGE CLASSIFICATION (pTNM, AJCC 8th Edition)   TNM Descriptors  Not applicable    Primary Tumor (pT)  pTis (DCIS)  "   Regional Lymph Nodes (pN)          Patient is healing up from surgery, has a drain in place. Chemoprevention discussed with patient and decided on starting Tamoxifen after her surgery. After 2 years of tamoxifen we can switch her to aromatase inhibitor if postmenopausal.    Started Tamoxifen in . No side effects as of today 2019    Past Medical History:   Diagnosis Date   • Anxiety    • Carpal tunnel syndrome of right wrist    • Chest pain radiating to jaw     STRESS TEST OK 2018, IN EPIC - D/T STRESS, PANIC    • GERD (gastroesophageal reflux disease)    • H/O Septicemia (CMS/HCC)     H/O Kidney stones   • Heart palpitations    • History of kidney stones    • Hyperlipidemia    • Hypertension         Past Surgical History:   Procedure Laterality Date   • BREAST BIOPSY Left    • BREAST RECONSTRUCTION Left 3/5/2019    Procedure: left prepectorasl placement of tissue expander with alloderm;  Surgeon: Abdirashid Ding MD;  Location: [facility];  Service: Plastics   • BREAST TISSUE EXPANDER REMOVAL INSERTION OF IMPLANT Bilateral 2019    Procedure: LEFT REMOVAL OF TISSUE EXPANDER AND PLACEMENT OF IMPLANT RIGHT AUGMENTATION RIGHT MASTOPEXY FOR SYMMETRY;  Surgeon: Abdirashid Ding MD;  Location: [facility];  Service: Plastics   •  SECTION  1994    x1   • EXTRACORPOREAL SHOCK WAVE LITHOTRIPSY (ESWL)     • FAT GRAFTING Left 2019    Procedure: FAT GRAFTING;  Surgeon: Abdirashid Ding MD;  Location: [facility];  Service: Plastics   • KIDNEY STONE SURGERY     • MASTECTOMY W/ SENTINEL NODE BIOPSY Left 3/5/2019    Procedure: LEFT SKIN-SPARING MASTECTOMY WITH SENTINEL LYMPH NODE BIOPSY;  Surgeon: Vonnie Cat MD;  Location: [facility];  Service: General        Current Outpatient Medications on File Prior to Visit   Medication Sig Dispense Refill   • Acetaminophen (TYLENOL ARTHRITIS PAIN PO) Take 500 mg by mouth 2 (Two) Times a Day.     •  Cholecalciferol (VITAMIN D) 1000 units tablet Take 1,000 Units by mouth Daily. HOLDING FOR SURGERY     • hydrOXYzine (ATARAX) 25 MG tablet Take 1 tablet by mouth 3 (Three) Times a Day As Needed for Itching or Anxiety. (Patient taking differently: Take 25 mg by mouth At Night As Needed for Itching or Anxiety.) 60 tablet 11   • ketotifen (ZADITOR) 0.025 % ophthalmic solution PLACE ONE DROP INTO BOTH EYES TWO TIMES A DAY 1 each 4   • lisinopril-hydrochlorothiazide (PRINZIDE,ZESTORETIC) 20-25 MG per tablet Take 1 tablet by mouth Every Night.     • ondansetron (ZOFRAN) 4 MG tablet Take 1 tablet by mouth Every 6 (Six) Hours As Needed for Nausea or Vomiting. 10 tablet 1   • tamoxifen (NOLVADEX) 20 MG chemo tablet      • acetaminophen (TYLENOL) 325 MG tablet Take 1 tablet by mouth Every 4 (Four) Hours As Needed for Mild Pain . 30 tablet 1   • atorvastatin (LIPITOR) 10 MG tablet Take 10 mg by mouth Every Night.     • docusate sodium (COLACE) 250 MG capsule Take 1 capsule by mouth 2 (Two) Times a Day As Needed for Constipation. 15 capsule 1   • oxybutynin XL (DITROPAN-XL) 10 MG 24 hr tablet Take 10 mg by mouth As Needed.     • raNITIdine (ZANTAC) 150 MG tablet Take 150 mg by mouth Every Night.       No current facility-administered medications on file prior to visit.         ALLERGIES:    Allergies   Allergen Reactions   • Hydrocodone Itching   • Sulfa Antibiotics Itching     swelling      OB/GYN history:  Age of menstrual.,  12 years  Last menstrual.  Was 2018, patient is perimenopausal   5 para 4, 1 miscarriage.  Age of first childbirth is 29  She did breast-feed all of her kids.  At least for 6 months.  She was exposed to birth control pills for 7 years.  More recently she was on hormone treatment because of heavy.'s which now has been discontinued.      Social history she is an LPN at [employer].  She does not smoke.  She drinks alcohol once a week.  She is  for the last 22 years and has a child  living with her who is 15 years old.      Family history: Father had lung cancer and  at age 64 with lung cancer was diagnosed at 62.  Mother is 72 in good health.  She has 1 brother 48 in good health.  She has 1 sister who had cervical cancer at age 28 but is in good health at 47.  She had a paternal aunt who had breast cancer in her late 60s.    Family History   Problem Relation Age of Onset   • Hypertension Mother    • Other Mother         Blood clots   • Emphysema Father    • Lung cancer Father 62   • Bone cancer Father 62   • Lupus Sister    • Cancer Sister 28        Cervical   • Other Brother         kidney stone   • Heart disease Maternal Grandfather 55   • Diabetes Maternal Grandfather    • Hypertension Maternal Grandfather    • Pancreatic cancer Paternal Grandmother 68   • Other Paternal Grandfather         kidney stone   • Stroke Maternal Grandmother    • Breast cancer Paternal Aunt         late 60's   • Ovarian cancer Sister 34   • Cancer Other    • Malig Hyperthermia Neg Hx         Review of Systems   Constitutional: Negative for appetite change, chills, diaphoresis, fatigue, fever and unexpected weight change.   HENT: Negative for hearing loss, sore throat and trouble swallowing.    Respiratory: Negative for cough, chest tightness, shortness of breath and wheezing.    Cardiovascular: Negative for chest pain, palpitations and leg swelling.   Gastrointestinal: Negative for abdominal distention, abdominal pain, constipation, diarrhea, nausea and vomiting.   Genitourinary: Negative for dysuria, frequency, hematuria and urgency.   Musculoskeletal: Negative for joint swelling.        No muscle weakness.   Skin: Negative for rash and wound.   Neurological: Negative for seizures, syncope, speech difficulty, weakness, numbness and headaches.   Hematological: Negative for adenopathy. Does not bruise/bleed easily.   Psychiatric/Behavioral: Negative for behavioral problems, confusion and suicidal ideas.  "  Patient has some pain in the left breast mastectomy site.  This is postsurgical in nature.    Objective     Vitals:    11/15/19 0822   BP: 137/88   Pulse: 77   Resp: 16   Temp: 98.3 °F (36.8 °C)   TempSrc: Oral   SpO2: 99%   Weight: 70 kg (154 lb 6.4 oz)   Height: 149.9 cm (59.02\")   PainSc: 0-No pain     Current Status 11/15/2019   ECOG score 0       Physical Exam    GENERAL:  Well-developed, well-nourished in no acute distress.   SKIN:  Warm, dry without rashes, purpura or petechiae.  NECK:  Supple with good range of motion; no thyromegaly or masses, no JVD.  LYMPHATICS:  No cervical, supraclavicular, axillary or inguinal adenopathy.  CHEST:  Lungs clear to auscultation. Good airflow.  BREAST: Right breast: No skin changes, no evidence of breast mass, no nipple discharge, no evidence of any right axillary adenopathy or right supraclavicular adenopathy  S/p left mastectomy.  No chest wall pain, no palpable masses, no axillary adenopathy or supraclavicular adenopathy.  CARDIAC:  Regular rate and rhythm without murmurs, rubs or gallops. Normal S1,S2.  ABDOMEN:  Soft, nontender with no hepatosplenomegaly or masses.  EXTREMITIES:  No clubbing, cyanosis or edema.  NEUROLOGICAL:  Cranial Nerves II-XII grossly intact.  No focal neurological deficits.  PSYCHIATRIC:  Normal affect and mood.      RECENT LABS:  Hematology WBC   Date Value Ref Range Status   11/15/2019 6.93 3.40 - 10.80 10*3/mm3 Final   12/10/2018 8.91 4.50 - 10.70 10*3/mm3 Final     RBC   Date Value Ref Range Status   11/15/2019 4.71 3.77 - 5.28 10*6/mm3 Final   12/10/2018 4.51 3.90 - 5.20 10*6/mm3 Final     Hemoglobin   Date Value Ref Range Status   11/15/2019 14.0 12.0 - 15.9 g/dL Final     Hematocrit   Date Value Ref Range Status   11/15/2019 41.9 34.0 - 46.6 % Final     Platelets   Date Value Ref Range Status   11/15/2019 299 140 - 450 10*3/mm3 Final        Assessment/Plan     1.  Tis N0, stage 0 DCIS of the left breast status post left mastectomy with " sentinel lymph nodes on March 5, 2019.  She is here for follow-up to discuss the risks versus the benefits of chemoprevention.  · Screening mammogram had shown focal asymmetry and calcifications in the posterior one third region of the right breast and also in the middle one third region of the right breast.  There is calcification in the posterior one third superior region of the left breast.  There are prominent axillary nodes in the left axilla.  · Diagnostic mammogram, January 4, 2019 shows right breast was negative.,  Calcifications in the left breast 12-1 o'clock position, 7 cm from the nipple, biopsy recommended, calcifications at 1 o'clock position left breast 8 cm from the nipple, biopsy recommended fatty lymph nodes in the left axilla, aspiration recommended.  Subareolar cyst aspiration suggested  · January 20, 2019 left breast cyst aspiration negative, left breast lymph node biopsy not done due to no suspicious lymph nodes on physical exam,        Left breast biopsy stereotactic x2, 12 to 1 o'clock position biopsy showedDCIS, with central necrosis, 6 mm ER +98%, NJ +92%, Ki-67 3.18%, margins are clear  · Left breast biopsy at 130 position shows low to intermediate grade cribriform and solid ductal carcinoma in situ with central necrosis, largest focus 4 mm, no invasive carcinoma, ER 98%, NJ 88%, Ki-67 4.4%, margins are clear. Hollis lymph nodes negative.  · Patient is 2 weeks out of surgery and healing well. Discussed to start Tamoxifen once she is healed. She is willing to consider that after 2 years of tamoxifen we could potentially switch her to aromatase inhibitor therapy  · Labs show patient is per-menopausal.   · She started taking Tamoxifen in April, 2019 and has been tolerating it well. No hot flashes, joint pains or any other side effects. Discussed the side effects of Tamoxifen again today.   · Educated patient to see her Gyn annually to monitor for risk of uterine cancer and to follow up with  her GYN if she experiences any abnormal vaginal bleeding.     2.  Family history positive for cervical cancer in her sister who was 28 years old, breast cancer in paternal aunt in late 60s and lung cancer in her dad at age 62.  Genetic testing has been done. InVitae testing is negative for any mutations.    3.  History of multiple kidney stones, had 16 kidney stones, underwent 2 laser treatments and one lithotripsy so far.  She was told to not take calcium supplements.  She had sepsis with 1 of her kidney stones during admission to [facility].    4.  Mild vitamin D deficiency.    5. Osteoarthritis.     Plan   1. Patient scheduled for mammogram next Monday   2. Continue tamoxifen 20 mg once daily and has been tolerating it well.   3. Patient has been advised to lower stress and increase physical activity.  4. Follow up with Lynn in 6 months with labs.       I, Radha Martinez, acted as scribe for Baylee Bailey MD  in documenting the service or procedure. To the best of my knowledge, I recorded what was dictated by MD Baylee Otero MD  11/15/19       MARIO- Carley Miller, Vonnie Schroeder MD

## 2019-11-16 PROBLEM — Z80.49 FAMILY HISTORY OF CERVICAL CANCER: Status: ACTIVE | Noted: 2019-11-16

## 2019-11-16 PROBLEM — Z87.442 HISTORY OF KIDNEY STONES: Status: ACTIVE | Noted: 2019-11-16

## 2019-11-16 PROBLEM — Z90.12 STATUS POST MASTECTOMY, LEFT: Status: ACTIVE | Noted: 2019-11-16

## 2019-11-16 PROBLEM — M16.12 OSTEOARTHRITIS OF LEFT HIP: Status: ACTIVE | Noted: 2019-11-16

## 2019-11-16 PROBLEM — E55.9 VITAMIN D DEFICIENCY: Status: ACTIVE | Noted: 2019-11-16

## 2019-11-18 ENCOUNTER — APPOINTMENT (OUTPATIENT)
Dept: WOMENS IMAGING | Facility: HOSPITAL | Age: 54
End: 2019-11-18

## 2019-11-25 ENCOUNTER — APPOINTMENT (OUTPATIENT)
Dept: WOMENS IMAGING | Facility: HOSPITAL | Age: 54
End: 2019-11-25

## 2019-11-25 PROCEDURE — 77067 SCR MAMMO BI INCL CAD: CPT | Performed by: RADIOLOGY

## 2019-11-25 PROCEDURE — 77063 BREAST TOMOSYNTHESIS BI: CPT | Performed by: RADIOLOGY

## 2019-12-02 ENCOUNTER — OFFICE VISIT (OUTPATIENT)
Dept: SURGERY | Facility: CLINIC | Age: 54
End: 2019-12-02

## 2019-12-02 ENCOUNTER — TELEPHONE (OUTPATIENT)
Dept: SURGERY | Facility: CLINIC | Age: 54
End: 2019-12-02

## 2019-12-02 VITALS
HEART RATE: 77 BPM | BODY MASS INDEX: 30.64 KG/M2 | SYSTOLIC BLOOD PRESSURE: 132 MMHG | WEIGHT: 152 LBS | OXYGEN SATURATION: 99 % | HEIGHT: 59 IN | DIASTOLIC BLOOD PRESSURE: 78 MMHG

## 2019-12-02 DIAGNOSIS — R92.8 ABNORMAL FINDING ON BREAST IMAGING: ICD-10-CM

## 2019-12-02 DIAGNOSIS — D05.12 DUCTAL CARCINOMA IN SITU (DCIS) OF LEFT BREAST: Primary | ICD-10-CM

## 2019-12-02 PROCEDURE — 99213 OFFICE O/P EST LOW 20 MIN: CPT | Performed by: SURGERY

## 2019-12-02 NOTE — PROGRESS NOTES
[facility]     Referring Provider: Julia Whitlock MD     Chief complaint: Routine followup DCIS     HPI:   2/4/19:  Ms. Mason Parada is a 54 yo woman, seen at the request of Dr. Julia Whitlock, for a new diagnosis of left breast DCIS. This was initially detected as an imaging abnormality. Her work-up is detailed in the oncologic history below. She denies any breast lumps, pain, skin changes, or nipple discharge. She denies any prior history of abnormal mammograms or breast biopsies. She has a family history of breast cancer in a paternal aunt, diagnosed in her late 60's. She has a family history of ovarian cancer in her sister, diagnosed at age 34.      3/21/19:  She underwent left mastectomy & SLNB with TE reconstruction on 3/5/19. See surgery & pathology details below in oncologic history. She has been recovering well and has no complaints. She has already seen Dr. Ding back and had one drain removed. She has seen Dr. Bailey and discussed chemoprevention. She will be starting tamoxifen in a few weeks.      6/20/19:  She returns today for scheduled follow-up. I have reviewed the interval records since her last visit. She underwent left implant exchange and right augmentation and mastopexy with Dr. Ding on 6/11/19. She has been recovering from this well. She is seeing Dr. Bailey and has continued on tamoxifen for chemoprevention. She is tolerating this reasonably well. She has occasional headaches and restless legs in the evening, which she is not sure whether or not is attributed to the tamoxifen or her other medications. She is considering switching the timing of her tamoxifen dose to the morning. She is still being followed by physical therapy. She denies any additional complaints.     12/2/19, Interval History:  She returns today for scheduled follow-up. I have reviewed the interval records since her last visit. She remains on tamoxifen and is still tolerating this fairly well. She has been  discharged from physical therapy. She underwent right screening mammogram prior to her appointment which showed an asymmetry requiring additional evaluation. She denies any new complaints.      Oncology/Hematology History    2008, Screening MMG: Negative, per pt report.         Ductal carcinoma in situ (DCIS) of left breast    10/31/2018 Initial Diagnosis     Ductal carcinoma in situ (DCIS) of left breast         11/1/2018 Imaging     Screening MMG with Celso ([facility]): Heterogeneously dense.  1. There is a focal asymmetry and calcifications seen in the posterior one-third central region of the right breast.  2. There is a focal asymmetry with associated calcifications seen in the middle one-third central region of the right breast.  3. There are calcifications seen in the posterior one-third superior region of the left breast.  4. There are prominent axillary lymph nodes in the left axilla.  BI-RADS 0: Incomplete.         1/4/2019 Imaging     Bilateral Diagnostic MMG with Celso & Bilateral US ([facility]):   MMG: Heterogeneously dense.  1. On the present examination, focal asymmetry in the right breast, central does not persist. The asymmetry noted on the recent screening mammogram resolves into stroma on additional views.  2. There are multiple amorphous and indistinct calcifications with segmental distribution in the middle one-third of the left breast at 12-1 o'clock, central located 7 cm from the nipple. These span approximately 2 cm. The asymmetry noted on the recent mammogram resolves into stroma on additional views.  3. There are several punctate and indistinct calcifications with linear distribution in the posterior one-third 1:30 o'clock region of the left breast located 8 cm from the nipple. These span approximately 1 cm.  4. There are a few small axillary lymph nodes in the left axilla.  US:  1. In the central, posterior one third region of the right breast, there is no evidence of any solid mass or abnormal cystic  "elements.  2. In the central, middle one-third of the left breast at 12-1 o'clock, there is no discrete solid or suspicious sonographic abnormalities.   4. Ultrasound is suggestive of a few fatty lymph nodes. The largest measures 19 x 8 x 12 mm. This demonstrates prominent cortex measuring up to 3 mm. The remaining smaller nodes demonstrate normal morphology and size.  5. There is an oval cluster of cyst with partially defined margins measuring 10 x 4 x 7 mm seen in the sub-areolar region of the left breast. Internal echotexture is heterogeneous.   6. There are a few small simple cyst seen in the left breast. These measure less than 1 cm.  IMPRESSION:  1. Area in the right breast is benign-negative.  2. Calcifications in the middle one third of the left breast at 12 to 1:00, centrally located 7 cm from the nipple are suspicious. Biopsy is recommended.  3. Calcifications in the posterior one third 1:00 region of the left breast located 8 cm from the nipple are suspicious. Biopsy is recommended.  4. Fatty lymph nodes in the left axilla are suspicious. Aspiration is recommended.  5. Cluster of cysts in the subareolar region of the left breast is suspicious. Cyst aspiration is recommended.  6. Simple cysts in the left breast are benign-negative.  BI-RADS 4B: Suspicious.          1/28/2019 Biopsy     Left breast, subareolar, Cyst Aspiration ([facility]):   Left subareolar breast tissue, ultrasound guided aspiration, thin prep:   No malignant cells identified. Cytologic features consistent with benign fibrocystic changes.  -1/4 cc of blood-tinged fluid was aspirated. The walls of the cyst collapsed. There was partial sonographic resolution. Concordant.    Left axillary lymph node FNA ([facility]):  -Procedure not performed, due to no suspicious lymph nodes on 1/28/19 exam.         1/28/2019 Biopsy     Left breast, Stereotactic Biopsy x 2 ([facility]):     1. Central Left breast Tissue, 12-1:00, \"tophat\" clip:  Intermediate Grade Cribriform " "Ductal Carcinoma in Situ with Central Necrosis, Microcalcification and Lobular Extension.   Largest focus of DCIS measuring 6 mm.   No invasive carcinoma identified.   Fibrofatty breast tissue also showing florid intraductal hyperplasia of the usual type, cystically dilated and ectatic ducts, patchy mild chronic stromal inflammation, and stromal microcalcifications.     ER+ (98.96%, strong)  NM+ 92.91%, strong)  Ki67 3.18%    2. Left Breast Tissue, 1:30, \"minicork\" clip:   Low to Intermediate Grade Cribriform and Solid Ductal Carcinoma in Situ with Central Necrosis, Microcalcifications and Lobular Extension.   Largest focus of DCIS measuring 4 mm.   No invasive carcinoma identified.   Fibrofatty breast tissue also showing columnar cell change, patchy mild chronic stromal inflammation and stromal microcalcifications.     ER+ (98.04%, strong)  NM+ (88.62%, strong)  Ki67 4.4%         1/31/2019 Imaging     [facility]):   Within the middle third of the left breast at the 12-o'clock position on the order of 5.5 cm from the nipple there is a metallic clip seen within a postbiopsy cavity that measures on the order of 1.4 cm in the superior to inferior dimension, 3.2 cm in anterior to posterior dimension and 3.1 cm in medial to lateral dimension. A metallic clip is seen within the central portion of the cavity. The metallic clip represents the T-shaped metallic clip seen on the postbiopsy mammogram. This represents a biopsy-proven site of malignancy. Clumped enhancement that has an aggregate measures on the order of 1.8 cm in superior to inferior dimension, 1.4 cm in anterior to posterior dimension and 1.3 cm in the medial to lateral dimension, as seen along the lateral margin of the T-shaped metallic clip. The postbiopsy mammogram is somewhat difficult to evaluate due to postbiopsy change, but calcifications are seen within this region.  In the posterior one-third of the upper quadrant of the left breast centered at " the 1-o'clock position on the order of 7.7 cm from the nipple there is a metallic clip which represents the barrel-shaped metallic clip. This metallic clip is located on the order of 1.3 cm superior to clumped enhancement that measures on the order of 1.1 cm in anterior to posterior dimension, 0.7 cm in the superior to inferior dimension and 0.7 cm in the medial to lateral dimension. This represents a biopsy-proven site of malignancy. Mammographically on the postbiopsy mammogram, there is a suggestion of some microcalcifications seen inferior to this barrel-shaped metallic clip that appear to correspond to the enhancement seen in this region. No other areas of abnormal enhancement or morphology are seen within the left breast. There is no evidence for left axillary adenopathy. There are no findings suspicious for malignancy in the right breast.  BI-RADS 6: Known biopsy-proven malignancy.          2/4/2019 Genetic Testing     [employer] Common Hereditary Cancers & Breast Cancer STAT Panels (47 genes):  Negative         3/5/2019 Surgery     Left skin-sparing mastectomy with SLNB & prepectoral TE reconstruction     1. Lymph Node, [place] Node #1 (two levels):  Single benign lymph node.     2. Lymph Node, [place] Node  #2, Excision (two levels):  Single benign lymph node.     3. Breast, Simple Mastectomy, Skin Sparing: Multifocal ductal carcinoma in-situ.               A. The ductal carcinoma in-situ is of intermediate nuclear grade.               B. The ductal carcinoma in-situ has comedo, micropapillary, solid and cribriform architecture.               C. The ductal carcinoma in-situ has comedo type expansive necrosis.               D. The ductal carcinoma in-situ is multifocal with the largest confluent area measuring 45 mm.                    Additional separate foci include two foci up to 20 mm , and one up to 23 mm (ductal                   carcinoma is in fourteen of thirty-one blocks (14/35).               F. The  ductal carcinoma in-situ comes within 20 mm of the superior margin, 100 mm of the inferior margin, 40                    mm of the medial margin, 50 mm of the lateral margin, 18 mm of the anterior margin and 15 mm of the posterior margin (closest margin posterior 15 mm away).               F. Hormone receptors were performed on prior tissue and were ER and VA positive.         4/1/2019 -  Hormonal Therapy     Tamoxifen 20 mg daily as chemoprevention         6/11/2019 Surgery     1.  Left breast removal of tissue expander with placement of permanent breast prosthesis, Allergan [id number] cohesive silicone implant  2.  Autologous fat grafting 45 cc to the left breast  3.  Augmentation and mastopexy to the right breast for symmetry, Allergan  cc silicone implant         11/25/2019 Imaging     Right Screening MMG with Celso ([facility]):  Heterogeneously dense. Patient has had interval implant placement. There is an asymmetry measuring 4 mm seen in the MLO view only seen in the posterior one third central region of the right breast at the edge of the film.  BI-RADS 0: Incomplete.            Review of Systems:  See interval history.       Medications:    Current Outpatient Medications:   •  Acetaminophen (TYLENOL ARTHRITIS PAIN PO), Take 500 mg by mouth 2 (Two) Times a Day., Disp: , Rfl:   •  Cholecalciferol (VITAMIN D) 1000 units tablet, Take 1,000 Units by mouth Daily. HOLDING FOR SURGERY, Disp: , Rfl:   •  hydrOXYzine (ATARAX) 25 MG tablet, Take 1 tablet by mouth 3 (Three) Times a Day As Needed for Itching or Anxiety. (Patient taking differently: Take 25 mg by mouth At Night As Needed for Itching or Anxiety.), Disp: 60 tablet, Rfl: 11  •  ketotifen (ZADITOR) 0.025 % ophthalmic solution, PLACE ONE DROP INTO BOTH EYES TWO TIMES A DAY, Disp: 1 each, Rfl: 4  •  lisinopril-hydrochlorothiazide (PRINZIDE,ZESTORETIC) 20-25 MG per tablet, Take 1 tablet by mouth Every Night., Disp: , Rfl:   •  ondansetron (ZOFRAN) 4 MG tablet,  Take 1 tablet by mouth Every 6 (Six) Hours As Needed for Nausea or Vomiting., Disp: 10 tablet, Rfl: 1  •  tamoxifen (NOLVADEX) 20 MG chemo tablet, , Disp: , Rfl:       Allergies   Allergen Reactions   • Hydrocodone Itching   • Sulfa Antibiotics Itching     swelling       Family History   Problem Relation Age of Onset   • Hypertension Mother    • Other Mother         Blood clots   • Emphysema Father    • Lung cancer Father 62   • Bone cancer Father 62   • Lupus Sister    • Cancer Sister 28        Cervical   • Other Brother         kidney stone   • Heart disease Maternal Grandfather 55   • Diabetes Maternal Grandfather    • Hypertension Maternal Grandfather    • Pancreatic cancer Paternal Grandmother 68   • Other Paternal Grandfather         kidney stone   • Stroke Maternal Grandmother    • Breast cancer Paternal Aunt         late 60's   • Ovarian cancer Sister 34   • Cancer Other    • Malig Hyperthermia Neg Hx        PHYSICAL EXAMINATION:   Vitals:    12/02/19 0807   BP: 132/78   Pulse: 77   SpO2: 99%     ECOG 0 - Asymptomatic  General: NAD, well appearing  Psych: a&o x 3, normal mood and affect  Eyes: EOMI, no scleral icterus  ENMT: neck supple without masses or thyromegaly, mucus membranes moist  MSK: normal gait, normal ROM in bilateral shoulders  Lymph nodes: no cervical, supraclavicular or axillary lymphadenopathy  Breast:   Right: Well-healed mastopexy incision with implant in place. Scars are soft. No masses.  Left: Sp mastectomy with implant in place. Soft, well-healed IMF incision and soft, well-healed central mastectomy scar. No masses.    I have independently reviewed her imaging and here are my findings:   Focal, subcentimeter asymmetry in right posterior breast.       Assessment:  54 y.o. F with a diagnosis of multifocal left breast DCIS: intermediate grade, ER/IA+. She is sp left [facility]SM & SLNB on 3/5/29, pTis (largest focus 4.5 cm). She is on tamoxifen for chemoprevention. She is sp left implant exchange  and right augmentation and mastopexy with Dr. Ding on 6/11/19.  -She has a right breast asymmetry on recent screening MMG.    Discussion:  I reassured her that after surgery the breast looks different and this is a new baseline mammogram.     Plan:  -Right diagnostic mammogram and ultrasound. I will call her with the results and the plan for additional follow-up.  -Continue f/u with Dr. Ding.  -Continue f/u with Dr. Bailey.  -She was instructed to call with any questions, concerns or changes on BSE.    Vonnie Cat MD      CC:  MD Ashele Bergeron APRN Jeannette Jaggers, APRN

## 2019-12-09 ENCOUNTER — APPOINTMENT (OUTPATIENT)
Dept: WOMENS IMAGING | Facility: HOSPITAL | Age: 54
End: 2019-12-09

## 2019-12-09 PROCEDURE — 77065 DX MAMMO INCL CAD UNI: CPT | Performed by: RADIOLOGY

## 2019-12-09 PROCEDURE — 76641 ULTRASOUND BREAST COMPLETE: CPT | Performed by: RADIOLOGY

## 2019-12-09 PROCEDURE — 77061 BREAST TOMOSYNTHESIS UNI: CPT | Performed by: RADIOLOGY

## 2019-12-09 PROCEDURE — G0279 TOMOSYNTHESIS, MAMMO: HCPCS | Performed by: RADIOLOGY

## 2019-12-12 ENCOUNTER — TELEPHONE (OUTPATIENT)
Dept: SURGERY | Facility: CLINIC | Age: 54
End: 2019-12-12

## 2019-12-12 NOTE — TELEPHONE ENCOUNTER
Called patient to let her know her US/MMG came back negative.    Patient has 6 mo f/u scheduled on 6/8/2020 @ 10:00am.    Called and spoke to patient, patient expressed verbal understanding of appointment times.

## 2019-12-24 RX ORDER — LISINOPRIL AND HYDROCHLOROTHIAZIDE 25; 20 MG/1; MG/1
1 TABLET ORAL DAILY
Qty: 30 TABLET | Refills: 0 | Status: SHIPPED | OUTPATIENT
Start: 2019-12-24 | End: 2020-01-13 | Stop reason: SDUPTHER

## 2020-01-01 DIAGNOSIS — F41.9 ANXIETY: ICD-10-CM

## 2020-01-02 RX ORDER — HYDROXYZINE HYDROCHLORIDE 25 MG/1
25 TABLET, FILM COATED ORAL 2 TIMES DAILY PRN
Qty: 60 TABLET | Refills: 11 | Status: SHIPPED | OUTPATIENT
Start: 2020-01-02 | End: 2020-06-26

## 2020-01-13 ENCOUNTER — OFFICE VISIT (OUTPATIENT)
Dept: FAMILY MEDICINE CLINIC | Facility: CLINIC | Age: 55
End: 2020-01-13

## 2020-01-13 VITALS
BODY MASS INDEX: 28.43 KG/M2 | WEIGHT: 141 LBS | OXYGEN SATURATION: 99 % | HEIGHT: 59 IN | HEART RATE: 83 BPM | SYSTOLIC BLOOD PRESSURE: 130 MMHG | RESPIRATION RATE: 16 BRPM | DIASTOLIC BLOOD PRESSURE: 88 MMHG

## 2020-01-13 DIAGNOSIS — I10 ESSENTIAL HYPERTENSION: ICD-10-CM

## 2020-01-13 DIAGNOSIS — H10.13 ALLERGIC CONJUNCTIVITIS OF BOTH EYES: ICD-10-CM

## 2020-01-13 DIAGNOSIS — E55.9 VITAMIN D DEFICIENCY: Primary | ICD-10-CM

## 2020-01-13 PROCEDURE — 99213 OFFICE O/P EST LOW 20 MIN: CPT | Performed by: NURSE PRACTITIONER

## 2020-01-13 RX ORDER — KETOTIFEN FUMARATE 0.35 MG/ML
1 SOLUTION/ DROPS OPHTHALMIC 2 TIMES DAILY
Qty: 1 EACH | Refills: 5 | Status: SHIPPED | OUTPATIENT
Start: 2020-01-13 | End: 2020-06-26 | Stop reason: SDUPTHER

## 2020-01-13 RX ORDER — LISINOPRIL AND HYDROCHLOROTHIAZIDE 25; 20 MG/1; MG/1
1 TABLET ORAL DAILY
Qty: 30 TABLET | Refills: 5 | Status: SHIPPED | OUTPATIENT
Start: 2020-01-13 | End: 2020-06-26 | Stop reason: SDUPTHER

## 2020-01-13 NOTE — PROGRESS NOTES
"Subjective   Mason Parada is a 54 y.o. female.     History of Present Illness    Since the last visit, she has overall felt well.  She has Essential Hypertension and well controlled on current medication and anxiety is well controlled on medication.  she has been compliant with current medications have reviewed them.  The patient denies medication side effects.  Will refill medications. /88   Pulse 83   Resp 16   Ht 149.9 cm (59\")   Wt 64 kg (141 lb)   SpO2 99%   BMI 28.48 kg/m²     Results for orders placed or performed in visit on 11/15/19   Comprehensive Metabolic Panel   Result Value Ref Range    Glucose 108 74 - 124 mg/dL    BUN 15 6 - 20 mg/dL    Creatinine 0.75 0.60 - 1.10 mg/dL    Sodium 141 134 - 145 mmol/L    Potassium 3.9 3.5 - 4.7 mmol/L    Chloride 102 98 - 107 mmol/L    CO2 26.4 22.0 - 29.0 mmol/L    Calcium 9.6 8.5 - 10.2 mg/dL    Total Protein 7.2 6.3 - 8.0 g/dL    Albumin 4.50 3.50 - 5.20 g/dL    ALT (SGPT) 23 0 - 33 U/L    AST (SGOT) 17 0 - 32 U/L    Alkaline Phosphatase 75 38 - 116 U/L    Total Bilirubin 0.4 0.2 - 1.2 mg/dL    eGFR Non African Amer 81 >60 mL/min/1.73    Globulin 2.7 1.8 - 3.5 gm/dL    A/G Ratio 1.7 1.1 - 2.4 g/dL    BUN/Creatinine Ratio 20.0 7.3 - 30.0    Anion Gap 12.6 5.0 - 15.0 mmol/L   CBC Auto Differential   Result Value Ref Range    WBC 6.93 3.40 - 10.80 10*3/mm3    RBC 4.71 3.77 - 5.28 10*6/mm3    Hemoglobin 14.0 12.0 - 15.9 g/dL    Hematocrit 41.9 34.0 - 46.6 %    MCV 89.0 79.0 - 97.0 fL    MCH 29.7 26.6 - 33.0 pg    MCHC 33.4 31.5 - 35.7 g/dL    RDW 12.0 (L) 12.3 - 15.4 %    RDW-SD 39.4 37.0 - 54.0 fl    MPV 8.8 6.0 - 12.0 fL    Platelets 299 140 - 450 10*3/mm3    Neutrophil % 60.1 42.7 - 76.0 %    Lymphocyte % 27.8 19.6 - 45.3 %    Monocyte % 6.6 5.0 - 12.0 %    Eosinophil % 4.0 0.3 - 6.2 %    Basophil % 0.9 0.0 - 1.5 %    Immature Grans % 0.6 (H) 0.0 - 0.5 %    Neutrophils, Absolute 4.16 1.70 - 7.00 10*3/mm3    Lymphocytes, Absolute 1.93 0.70 - 3.10 " 10*3/mm3    Monocytes, Absolute 0.46 0.10 - 0.90 10*3/mm3    Eosinophils, Absolute 0.28 0.00 - 0.40 10*3/mm3    Basophils, Absolute 0.06 0.00 - 0.20 10*3/mm3    Immature Grans, Absolute 0.04 0.00 - 0.05 10*3/mm3    nRBC 0.0 0.0 - 0.2 /100 WBC         The following portions of the patient's history were reviewed and updated as appropriate: allergies, current medications, past family history, past medical history, past social history, past surgical history and problem list.    Review of Systems   Constitutional: Negative for unexpected weight change.   Respiratory: Negative for shortness of breath.    Cardiovascular: Negative for chest pain and palpitations.   Endocrine: Negative for cold intolerance, heat intolerance, polydipsia, polyphagia and polyuria.   Psychiatric/Behavioral: Negative for behavioral problems.       Objective   Physical Exam   Constitutional: She is oriented to person, place, and time. She appears well-developed and well-nourished.   Neck: Carotid bruit is not present.   Cardiovascular: Normal rate and regular rhythm.   Pulmonary/Chest: Effort normal and breath sounds normal.   Neurological: She is alert and oriented to person, place, and time.   Psychiatric: She has a normal mood and affect. Judgment normal.   Nursing note and vitals reviewed.      Assessment/Plan   Mason was seen today for hypertension and labs.    Diagnoses and all orders for this visit:    Vitamin D deficiency  -     Lipid panel  -     TSH  -     Vitamin D 25 Hydroxy    Essential hypertension  -     Lipid panel  -     TSH  -     Vitamin D 25 Hydroxy  -     lisinopril-hydrochlorothiazide (PRINZIDE,ZESTORETIC) 20-25 MG per tablet; Take 1 tablet by mouth Daily. PLEASE MAKE APPOINTMENT    Allergic conjunctivitis of both eyes  -     ketotifen (ZADITOR) 0.025 % ophthalmic solution; Administer 1 drop to both eyes 2 (Two) Times a Day.

## 2020-02-14 LAB
25(OH)D3+25(OH)D2 SERPL-MCNC: 33.8 NG/ML (ref 30–100)
CHOLEST SERPL-MCNC: 228 MG/DL (ref 100–199)
HDLC SERPL-MCNC: 36 MG/DL
LDLC SERPL CALC-MCNC: 146 MG/DL (ref 0–99)
TRIGL SERPL-MCNC: 229 MG/DL (ref 0–149)
TSH SERPL DL<=0.005 MIU/L-ACNC: 2.56 UIU/ML (ref 0.45–4.5)
VLDLC SERPL CALC-MCNC: 46 MG/DL (ref 5–40)

## 2020-05-04 ENCOUNTER — TRANSCRIBE ORDERS (OUTPATIENT)
Dept: SURGERY | Facility: CLINIC | Age: 55
End: 2020-05-04

## 2020-05-04 ENCOUNTER — TELEPHONE (OUTPATIENT)
Dept: SURGERY | Facility: CLINIC | Age: 55
End: 2020-05-04

## 2020-05-04 DIAGNOSIS — Z12.31 VISIT FOR SCREENING MAMMOGRAM: Primary | ICD-10-CM

## 2020-05-04 NOTE — TELEPHONE ENCOUNTER
Due to COVID-19 patient's appointment on 6/8/2020 has been cancelled.    Called and left message with patient, patient to call back and confirm.    If patient is not currently having any issues, I will call her to f/u in 11/2020 with a screen MMG.

## 2020-05-05 ENCOUNTER — TELEPHONE (OUTPATIENT)
Dept: SURGERY | Facility: CLINIC | Age: 55
End: 2020-05-05

## 2020-05-05 NOTE — TELEPHONE ENCOUNTER
Patient called back to confirm that she is not currently having any problems and will f/u in 11/2020 with a screening MMG.    MMG is scheduled @ [facility] on 11/30/2020 @ 10:30am.    F/u appointment with Dr. Cat is scheduled on 12/9/2020 @ 10:30am.    Called and spoke to patient, patient expressed verbal understanding of appointment times.    Sent patient a reminder letter in the mail.

## 2020-05-11 ENCOUNTER — OFFICE VISIT (OUTPATIENT)
Dept: ONCOLOGY | Facility: CLINIC | Age: 55
End: 2020-05-11

## 2020-05-11 ENCOUNTER — LAB (OUTPATIENT)
Dept: LAB | Facility: HOSPITAL | Age: 55
End: 2020-05-11

## 2020-05-11 VITALS
WEIGHT: 151 LBS | HEART RATE: 74 BPM | SYSTOLIC BLOOD PRESSURE: 144 MMHG | RESPIRATION RATE: 16 BRPM | OXYGEN SATURATION: 98 % | DIASTOLIC BLOOD PRESSURE: 88 MMHG | TEMPERATURE: 98.1 F | BODY MASS INDEX: 30.44 KG/M2 | HEIGHT: 59 IN

## 2020-05-11 DIAGNOSIS — D05.12 DUCTAL CARCINOMA IN SITU (DCIS) OF LEFT BREAST: ICD-10-CM

## 2020-05-11 DIAGNOSIS — D05.12 DUCTAL CARCINOMA IN SITU (DCIS) OF LEFT BREAST: Primary | ICD-10-CM

## 2020-05-11 LAB
ALBUMIN SERPL-MCNC: 4.8 G/DL (ref 3.5–5.2)
ALBUMIN/GLOB SERPL: 1.8 G/DL (ref 1.1–2.4)
ALP SERPL-CCNC: 70 U/L (ref 38–116)
ALT SERPL W P-5'-P-CCNC: 22 U/L (ref 0–33)
ANION GAP SERPL CALCULATED.3IONS-SCNC: 13.6 MMOL/L (ref 5–15)
AST SERPL-CCNC: 22 U/L (ref 0–32)
BASOPHILS # BLD AUTO: 0.07 10*3/MM3 (ref 0–0.2)
BASOPHILS NFR BLD AUTO: 1 % (ref 0–1.5)
BILIRUB SERPL-MCNC: 0.4 MG/DL (ref 0.2–1.2)
BUN BLD-MCNC: 17 MG/DL (ref 6–20)
BUN/CREAT SERPL: 22.1 (ref 7.3–30)
CALCIUM SPEC-SCNC: 10 MG/DL (ref 8.5–10.2)
CHLORIDE SERPL-SCNC: 101 MMOL/L (ref 98–107)
CO2 SERPL-SCNC: 25.4 MMOL/L (ref 22–29)
CREAT BLD-MCNC: 0.77 MG/DL (ref 0.6–1.1)
DEPRECATED RDW RBC AUTO: 37.2 FL (ref 37–54)
EOSINOPHIL # BLD AUTO: 0.19 10*3/MM3 (ref 0–0.4)
EOSINOPHIL NFR BLD AUTO: 2.7 % (ref 0.3–6.2)
ERYTHROCYTE [DISTWIDTH] IN BLOOD BY AUTOMATED COUNT: 11.6 % (ref 12.3–15.4)
GFR SERPL CREATININE-BSD FRML MDRD: 79 ML/MIN/1.73
GLOBULIN UR ELPH-MCNC: 2.7 GM/DL (ref 1.8–3.5)
GLUCOSE BLD-MCNC: 130 MG/DL (ref 74–124)
HCT VFR BLD AUTO: 42.7 % (ref 34–46.6)
HGB BLD-MCNC: 14.2 G/DL (ref 12–15.9)
IMM GRANULOCYTES # BLD AUTO: 0.03 10*3/MM3 (ref 0–0.05)
IMM GRANULOCYTES NFR BLD AUTO: 0.4 % (ref 0–0.5)
LYMPHOCYTES # BLD AUTO: 2.24 10*3/MM3 (ref 0.7–3.1)
LYMPHOCYTES NFR BLD AUTO: 31.9 % (ref 19.6–45.3)
MCH RBC QN AUTO: 29.5 PG (ref 26.6–33)
MCHC RBC AUTO-ENTMCNC: 33.3 G/DL (ref 31.5–35.7)
MCV RBC AUTO: 88.6 FL (ref 79–97)
MONOCYTES # BLD AUTO: 0.41 10*3/MM3 (ref 0.1–0.9)
MONOCYTES NFR BLD AUTO: 5.8 % (ref 5–12)
NEUTROPHILS # BLD AUTO: 4.09 10*3/MM3 (ref 1.7–7)
NEUTROPHILS NFR BLD AUTO: 58.2 % (ref 42.7–76)
NRBC BLD AUTO-RTO: 0 /100 WBC (ref 0–0.2)
PLATELET # BLD AUTO: 322 10*3/MM3 (ref 140–450)
PMV BLD AUTO: 8.8 FL (ref 6–12)
POTASSIUM BLD-SCNC: 4.3 MMOL/L (ref 3.5–4.7)
PROT SERPL-MCNC: 7.5 G/DL (ref 6.3–8)
RBC # BLD AUTO: 4.82 10*6/MM3 (ref 3.77–5.28)
SODIUM BLD-SCNC: 140 MMOL/L (ref 134–145)
WBC NRBC COR # BLD: 7.03 10*3/MM3 (ref 3.4–10.8)

## 2020-05-11 PROCEDURE — 80053 COMPREHEN METABOLIC PANEL: CPT

## 2020-05-11 PROCEDURE — 36415 COLL VENOUS BLD VENIPUNCTURE: CPT

## 2020-05-11 PROCEDURE — 99214 OFFICE O/P EST MOD 30 MIN: CPT | Performed by: INTERNAL MEDICINE

## 2020-05-11 PROCEDURE — 85025 COMPLETE CBC W/AUTO DIFF WBC: CPT

## 2020-05-11 NOTE — PROGRESS NOTES
Subjective     REASON FOR FOLLOW-UP:    1.  Newly diagnosed DCIS, Tis N0, stage 0 DCIS.   Start date of tamoxifen: April 1, 2019.  · S/p left mastectomy    History of Present Illness   Patient is a 53-year-old female with newly diagnosed DCIS, Tis N), stage 0 DCIS,with oncologic history as following. She has started taking Tamoxifen beginning of April and has been tolerating it well.     Patient's hot flashes are resolved.  She is tolerating well.  No weight gain.  She is got good appetite.  Patient had screening mammogram November 27, 2019 and there was asymmetry which required additional evaluation.  Patient was seen by staff Dr. Vonnie Cat who ordered right diagnostic mammogram and ultrasound.  Which was done December 17, 2020 and was negative.      Oncologic history:  Patient is a 53-year-old female who works at [employer], she follows up with  Ashlee SHERMAN and also with LANE Hernandez at OB/GYN at [facility].  She had a routine mammogram    November 1, 2018: Had screening mammogram which showed calcifications in the right breast as well as on the left breast.  With prominent axillary lymph nodes in the left axilla.  Screening MMG with [place] ([facility]): Heterogeneously dense.  1. There is a focal asymmetry and calcifications seen in the posterior one-third central region of the right breast.  2. There is a focal asymmetry with associated calcifications seen in the middle one-third central region of the right breast.  3. There are calcifications seen in the posterior one-third superior region of the left breast.  4. There are prominent axillary lymph nodes in the left axilla.    Diagnostic mammogram bilateral, January 4, 2019  The right breast does not show any abnormality in the focal asymmetry does not persist in the right breast.       IMPRESSION:  1. Area in the right breast is benign-negative.  2. Calcifications in the middle one third of the left breast at 12 to 1:00, centrally located 7 cm from  "the nipple are suspicious. Biopsy is recommended.  3. Calcifications in the posterior one third 1:00 region of the left breast located 8 cm from the nipple are suspicious. Biopsy is recommended.  4. Fatty lymph nodes in the left axilla are suspicious. Aspiration is recommended.  5. Cluster of cysts in the subareolar region of the left breast is suspicious. Cyst aspiration is recommended.  6. Simple cysts in the left breast are benign-negative.    Biopsy, done January 28, 2019.  1/28/2019      Left breast, subareolar, Cyst Aspiration ([facility]):   Left subareolar breast tissue, ultrasound guided aspiration, thin prep:   No malignant cells identified. Cytologic features consistent with benign fibrocystic changes.  -1/4 cc of blood-tinged fluid was aspirated. The walls of the cyst collapsed. There was partial sonographic resolution. Concordant.     Left axillary lymph node FNA ([facility]):  -Procedure not performed, due to no suspicious lymph nodes on 1/28/19 exam.            Biopsy     The subareolar left breast cyst aspiration was negative for any malignant cells.  The left axillary lymph node biopsy was not performed as no suspicious lymph nodes were seen on exam.    January 28/2019, left breast stereotactic biopsy x2  The biopsy of the 12 to 1 o'clock position showed intermediate grade DCIS with central necrosis.  No invasive carcinoma identified.  1. Central Left breast Tissue, 12-1:00, \"tophat\" clip:  Intermediate Grade Cribriform Ductal Carcinoma in Situ with Central Necrosis, Microcalcification and Lobular Extension.   Largest focus of DCIS measuring 6 mm.   No invasive carcinoma identified.   Fibrofatty breast tissue also showing florid intraductal hyperplasia of the usual type, cystically dilated and ectatic ducts, patchy mild chronic stromal inflammation, and stromal microcalcifications.      Estrogen receptor 98.96%  Progesterone receptor 92.91%  Ki-67 3.18%.    2. Left Breast Tissue, 1:30, \"minicork\" clip:   Low to " Intermediate Grade Cribriform and Solid Ductal Carcinoma in Situ with Central Necrosis, Microcalcifications and Lobular Extension.   Largest focus of DCIS measuring 4 mm.   No invasive carcinoma identified.   Fibrofatty breast tissue also showing columnar cell change, patchy mild chronic stromal inflammation and stromal microcalcifications.      ER+ (98.04%, strong)  NV+ (88.62%, strong)  Ki67 4.4%    2019:  MRI breast shows    IMPRESSION:  1. Biopsy-proven site of malignancy represented by the T-shaped metallic  clip seen at the 12-o'clock position in the left breast. There remains  clumped enhancement that as an aggregate measures on the order of 1.8 cm  that is seen immediately lateral to the T-shaped clip in the location of  calcifications seen mammographically. No evidence for left axillary  adenopathy is appreciated.  2. Biopsy-proven site of malignancy represented by a barrel-shaped  metallic clip in the posterior one-third of the left breast at the  1-o'clock position with clumped enhancement seen on the order of 1 cm  inferior to the metallic clip that measures on the order of 1.1 cm in  greatest dimension. Calcifications appear to be present mammographically  in this region. If further imaging evaluation is desired prior to  surgical therapy, correlation with mammography of the left breast is  suggested. No evidence for left axillary adenopathy is appreciated.  3. There are no findings suspicious for malignancy in the right breast.  4. Scattered enhancement of a benign parenchymal character is noted in  both breasts.\      Patient has family history of breast cancer in a maternal aunt in the late 60s.  Her sister had cervical cancer at age 28.  Father had lung cancer at age 62 and  with lung cancer at age 64.    Patient underwent genetic testing.  The Cardiac [employer] genetic test is negative.    Patient underwent left mastectomy, skin sparing with sentinel lymph node biopsy on 2019.  She  "has reconstruction done by Dr. Onofre with placement of left breast prepectoral tissue expander.  And placement of acellular dermal matrix.    Pathology shows  Synoptic Checklist   DCIS OF THE BREAST   Breast DCIS - All Specimens   CLINICAL   Clinical History  Prior history of breast cancer    Radiologic Finding  Calcifications    SPECIMEN   Procedure  Total mastectomy    Specimen Laterality  Left    TUMOR   Tumor Site  Upper outer quadrant      Upper inner quadrant    Histologic Type  Ductal carcinoma in situ    Size (Extent) of DCIS  Estimated size of DCIS greatest dimension in Millimeters (mm) is at least: Multifocal with one focus up to 45 mm, two up to 20 mm, and one  up to 23 mm. Millimeters (mm)   Number of Blocks with DCIS  14    Number of Blocks Examined  35    Architectural Patterns  Comedo      Cribriform      Micropapillary      Solid    Nuclear Grade  Grade II (intermediate)    Accessory Findings     Necrosis  Present, central (expansive \"comedo\" necrosis)    Microcalcifications  Present in DCIS      Present in nonneoplastic tissue    MARGINS   Margins  Uninvolved by DCIS    Distance of DCIS from Anterior Margin in Millimeters (mm)  18 Millimeters (mm)   Distance of DCIS from Posterior Margin in Millimeters (mm)  15 Millimeters (mm)   Distance of DCIS from Superior Margin in Millimeters (mm)  20 Millimeters (mm)   Distance of DCIS from Inferior Margin in Millimeters (mm)  100 Millimeters (mm)   Distance of DCIS from Medial Margin in Millimeters (mm)  40 Millimeters (mm)   Distance of DCIS from Lateral Margin in Millimeters (mm)  50 Millimeters (mm)   Distance from Closest Margin in Millimeters (mm)  15 Millimeters (mm)   Closest Margin  Posterior    LYMPH NODES   Regional Lymph Nodes  Uninvolved by tumor cells    Number of Lymph Nodes Examined  2    Number of [place] Nodes Examined  2    PATHOLOGIC STAGE CLASSIFICATION (pTNM, AJCC 8th Edition)   TNM Descriptors  Not applicable    Primary Tumor (pT)  " pTis (DCIS)    Regional Lymph Nodes (pN)          Patient is healing up from surgery, has a drain in place. Chemoprevention discussed with patient and decided on starting Tamoxifen after her surgery. After 2 years of tamoxifen we can switch her to aromatase inhibitor if postmenopausal.    Started Tamoxifen in . No side effects as of today 2019 screening mammogram showed asymmetry in the right breast  2020 right diagnostic mammogram and ultrasound negative    Past Medical History:   Diagnosis Date   • Anxiety    • Carpal tunnel syndrome of right wrist    • Chest pain radiating to jaw     STRESS TEST OK 2018, IN EPIC - D/T STRESS, PANIC    • GERD (gastroesophageal reflux disease)    • H/O Septicemia (CMS/HCC)     H/O Kidney stones   • Heart palpitations    • History of kidney stones    • Hyperlipidemia    • Hypertension         Past Surgical History:   Procedure Laterality Date   • BREAST BIOPSY Left    • BREAST RECONSTRUCTION Left 3/5/2019    Procedure: left prepectorasl placement of tissue expander with alloderm;  Surgeon: Abdirashid Ding MD;  Location: [facility];  Service: Plastics   • BREAST TISSUE EXPANDER REMOVAL INSERTION OF IMPLANT Bilateral 2019    Procedure: LEFT REMOVAL OF TISSUE EXPANDER AND PLACEMENT OF IMPLANT RIGHT AUGMENTATION RIGHT MASTOPEXY FOR SYMMETRY;  Surgeon: Abdirashid Ding MD;  Location: [facility] OR;  Service: Plastics   •  SECTION  1994    x1   • EXTRACORPOREAL SHOCK WAVE LITHOTRIPSY (ESWL)     • FAT GRAFTING Left 2019    Procedure: FAT GRAFTING;  Surgeon: Abdirashid Ding MD;  Location: [facility] OR;  Service: Plastics   • KIDNEY STONE SURGERY     • MASTECTOMY W/ SENTINEL NODE BIOPSY Left 3/5/2019    Procedure: LEFT SKIN-SPARING MASTECTOMY WITH SENTINEL LYMPH NODE BIOPSY;  Surgeon: Vonnie Cat MD;  Location: [facility] OR;  Service: General        Current Outpatient  Medications on File Prior to Visit   Medication Sig Dispense Refill   • Acetaminophen (TYLENOL ARTHRITIS PAIN PO) Take 500 mg by mouth 2 (Two) Times a Day.     • Cholecalciferol (VITAMIN D) 1000 units tablet Take 1,000 Units by mouth Daily. HOLDING FOR SURGERY     • hydrOXYzine (ATARAX) 25 MG tablet Take 1 tablet by mouth 2 (Two) Times a Day As Needed for Itching or Anxiety. 60 tablet 11   • ketotifen (ZADITOR) 0.025 % ophthalmic solution Administer 1 drop to both eyes 2 (Two) Times a Day. 1 each 5   • lisinopril-hydrochlorothiazide (PRINZIDE,ZESTORETIC) 20-25 MG per tablet Take 1 tablet by mouth Daily. PLEASE MAKE APPOINTMENT 30 tablet 5   • ondansetron (ZOFRAN) 4 MG tablet Take 1 tablet by mouth Every 6 (Six) Hours As Needed for Nausea or Vomiting. 10 tablet 1   • tamoxifen (NOLVADEX) 20 MG chemo tablet        No current facility-administered medications on file prior to visit.         ALLERGIES:    Allergies   Allergen Reactions   • Hydrocodone Itching   • Sulfa Antibiotics Itching     swelling      OB/GYN history:  Age of menstrual.,  12 years  Last menstrual.  Was 2018, patient is perimenopausal   5 para 4, 1 miscarriage.  Age of first childbirth is 29  She did breast-feed all of her kids.  At least for 6 months.  She was exposed to birth control pills for 7 years.  More recently she was on hormone treatment because of heavy.'s which now has been discontinued.      Social history she is an LPN at [employer].  She does not smoke.  She drinks alcohol once a week.  She is  for the last 22 years and has a child living with her who is 15 years old.      Family history: Father had lung cancer and  at age 64 with lung cancer was diagnosed at 62.  Mother is 72 in good health.  She has 1 brother 48 in good health.  She has 1 sister who had cervical cancer at age 28 but is in good health at 47.  She had a paternal aunt who had breast cancer in her late 60s.    Family History   Problem  "Relation Age of Onset   • Hypertension Mother    • Other Mother         Blood clots   • Emphysema Father    • Lung cancer Father 62   • Bone cancer Father 62   • Lupus Sister    • Cancer Sister 28        Cervical   • Other Brother         kidney stone   • Heart disease Maternal Grandfather 55   • Diabetes Maternal Grandfather    • Hypertension Maternal Grandfather    • Pancreatic cancer Paternal Grandmother 68   • Other Paternal Grandfather         kidney stone   • Stroke Maternal Grandmother    • Breast cancer Paternal Aunt         late 60's   • Ovarian cancer Sister 34   • Cancer Other    • Malig Hyperthermia Neg Hx         Review of Systems   Constitutional: Negative for appetite change, chills, diaphoresis, fatigue, fever and unexpected weight change.   HENT: Negative for hearing loss, sore throat and trouble swallowing.    Respiratory: Negative for cough, chest tightness, shortness of breath and wheezing.    Cardiovascular: Negative for chest pain, palpitations and leg swelling.   Gastrointestinal: Negative for abdominal distention, abdominal pain, constipation, diarrhea, nausea and vomiting.   Genitourinary: Negative for dysuria, frequency, hematuria and urgency.   Musculoskeletal: Negative for joint swelling.        No muscle weakness.   Skin: Negative for rash and wound.   Neurological: Negative for seizures, syncope, speech difficulty, weakness, numbness and headaches.   Hematological: Negative for adenopathy. Does not bruise/bleed easily.   Psychiatric/Behavioral: Negative for behavioral problems, confusion and suicidal ideas.   Patient has some pain in the left breast mastectomy site.  This is postsurgical in nature.    Objective     Vitals:    05/11/20 1042   BP: 144/88   Pulse: 74   Resp: 16   Temp: 98.1 °F (36.7 °C)   TempSrc: Oral   SpO2: 98%   Weight: 68.5 kg (151 lb)   Height: 149.9 cm (59.02\")   PainSc: 0-No pain     Current Status 5/11/2020   ECOG score 0       Physical Exam    GENERAL:  " Well-developed, well-nourished in no acute distress.   SKIN:  Warm, dry without rashes, purpura or petechiae.  NECK:  Supple with good range of motion; no thyromegaly or masses, no JVD.  LYMPHATICS:  No cervical, supraclavicular, axillary or inguinal adenopathy.  CHEST:  Lungs clear to auscultation. Good airflow.  BREAST: Right breast: No skin changes, no evidence of breast mass, no nipple discharge, no evidence of any right axillary adenopathy or right supraclavicular adenopathy  S/p left mastectomy.  No chest wall pain, no palpable masses, no axillary adenopathy or supraclavicular adenopathy.  CARDIAC:  Regular rate and rhythm without murmurs, rubs or gallops. Normal S1,S2.  ABDOMEN:  Soft, nontender with no hepatosplenomegaly or masses.  EXTREMITIES:  No clubbing, cyanosis or edema.  NEUROLOGICAL:  Cranial Nerves II-XII grossly intact.  No focal neurological deficits.  PSYCHIATRIC:  Normal affect and mood.    No change in her physical examination    Examination is unchanged as above as of May 11, 2020      RECENT LABS:  Hematology WBC   Date Value Ref Range Status   05/11/2020 7.03 3.40 - 10.80 10*3/mm3 Final   12/10/2018 8.91 4.50 - 10.70 10*3/mm3 Final     RBC   Date Value Ref Range Status   05/11/2020 4.82 3.77 - 5.28 10*6/mm3 Final   12/10/2018 4.51 3.90 - 5.20 10*6/mm3 Final     Hemoglobin   Date Value Ref Range Status   05/11/2020 14.2 12.0 - 15.9 g/dL Final     Hematocrit   Date Value Ref Range Status   05/11/2020 42.7 34.0 - 46.6 % Final     Platelets   Date Value Ref Range Status   05/11/2020 322 140 - 450 10*3/mm3 Final        Assessment/Plan     1.  Tis N0, stage 0 DCIS of the left breast status post left mastectomy with sentinel lymph nodes on March 5, 2019.  She is here for follow-up to discuss the risks versus the benefits of chemoprevention.  · Screening mammogram had shown focal asymmetry and calcifications in the posterior one third region of the right breast and also in the middle one third  region of the right breast.  There is calcification in the posterior one third superior region of the left breast.  There are prominent axillary nodes in the left axilla.  · Diagnostic mammogram, January 4, 2019 shows right breast was negative.,  Calcifications in the left breast 12-1 o'clock position, 7 cm from the nipple, biopsy recommended, calcifications at 1 o'clock position left breast 8 cm from the nipple, biopsy recommended fatty lymph nodes in the left axilla, aspiration recommended.  Subareolar cyst aspiration suggested  · January 20, 2019 left breast cyst aspiration negative, left breast lymph node biopsy not done due to no suspicious lymph nodes on physical exam,        Left breast biopsy stereotactic x2, 12 to 1 o'clock position biopsy showedDCIS, with central necrosis, 6 mm ER +98%, MI +92%, Ki-67 3.18%, margins are clear  · Left breast biopsy at 130 position shows low to intermediate grade cribriform and solid ductal carcinoma in situ with central necrosis, largest focus 4 mm, no invasive carcinoma, ER 98%, MI 88%, Ki-67 4.4%, margins are clear. [place] lymph nodes negative.  · Patient is 2 weeks out of surgery and healing well. Discussed to start Tamoxifen once she is healed. She is willing to consider that after 2 years of tamoxifen we could potentially switch her to aromatase inhibitor therapy  · Labs show patient is per-menopausal.   · She started taking Tamoxifen in April, 2019 and has been tolerating it well. No hot flashes, joint pains or any other side effects. Discussed the side effects of Tamoxifen again today.   · Educated patient to see her Gyn annually to monitor for risk of uterine cancer and to follow up with her GYN if she experiences any abnormal vaginal bleeding.     2.  Family history positive for cervical cancer in her sister who was 28 years old, breast cancer in paternal aunt in late 60s and lung cancer in her dad at age 62.  Genetic testing has been done. [employer]ae testing is  negative for any mutations.    3.  History of multiple kidney stones, had 16 kidney stones, underwent 2 laser treatments and one lithotripsy so far.  She was told to not take calcium supplements.  She had sepsis with 1 of her kidney stones during admission to [facility].    4.  Mild vitamin D deficiency.    5. Osteoarthritis.     Plan   1. Continue tamoxifen  2. Reviewed screening mammogram from November 20 19, which showed asymmetry in the right breast and reviewed diagnostic mammogram and ultrasound from December 17, 2020 which is negative  3. Follow-up with me in 6 months with labs  4. Follow-up with Dr. Vonnie Bailey MD  May 11, 2020       CC- LANE Atkinson Stephanie MD

## 2020-05-12 RX ORDER — TAMOXIFEN CITRATE 20 MG/1
TABLET ORAL
Qty: 90 TABLET | Refills: 1 | Status: SHIPPED | OUTPATIENT
Start: 2020-05-12 | End: 2020-06-26

## 2020-06-25 DIAGNOSIS — F41.9 ANXIETY: ICD-10-CM

## 2020-06-26 ENCOUNTER — OFFICE VISIT (OUTPATIENT)
Dept: FAMILY MEDICINE CLINIC | Facility: CLINIC | Age: 55
End: 2020-06-26

## 2020-06-26 VITALS
OXYGEN SATURATION: 99 % | DIASTOLIC BLOOD PRESSURE: 70 MMHG | HEART RATE: 71 BPM | WEIGHT: 148 LBS | RESPIRATION RATE: 16 BRPM | SYSTOLIC BLOOD PRESSURE: 100 MMHG | TEMPERATURE: 97.7 F | BODY MASS INDEX: 29.84 KG/M2 | HEIGHT: 59 IN

## 2020-06-26 DIAGNOSIS — I10 ESSENTIAL HYPERTENSION: ICD-10-CM

## 2020-06-26 DIAGNOSIS — H10.13 ALLERGIC CONJUNCTIVITIS OF BOTH EYES: ICD-10-CM

## 2020-06-26 DIAGNOSIS — F41.9 ANXIETY: Primary | ICD-10-CM

## 2020-06-26 PROCEDURE — 99213 OFFICE O/P EST LOW 20 MIN: CPT | Performed by: NURSE PRACTITIONER

## 2020-06-26 RX ORDER — TAMOXIFEN CITRATE 20 MG/1
TABLET ORAL
Qty: 90 TABLET | Refills: 1 | Status: SHIPPED | OUTPATIENT
Start: 2020-06-26 | End: 2021-04-23

## 2020-06-26 RX ORDER — KETOTIFEN FUMARATE 0.35 MG/ML
1 SOLUTION/ DROPS OPHTHALMIC 2 TIMES DAILY
Qty: 1 EACH | Refills: 5 | Status: SHIPPED | OUTPATIENT
Start: 2020-06-26 | End: 2021-07-30 | Stop reason: SDUPTHER

## 2020-06-26 RX ORDER — HYDROXYZINE HYDROCHLORIDE 25 MG/1
TABLET, FILM COATED ORAL
Qty: 60 TABLET | Refills: 10 | Status: SHIPPED | OUTPATIENT
Start: 2020-06-26 | End: 2021-01-21 | Stop reason: SDUPTHER

## 2020-06-26 RX ORDER — LISINOPRIL AND HYDROCHLOROTHIAZIDE 25; 20 MG/1; MG/1
1 TABLET ORAL DAILY
Qty: 30 TABLET | Refills: 5 | Status: SHIPPED | OUTPATIENT
Start: 2020-06-26 | End: 2021-01-11 | Stop reason: SDUPTHER

## 2020-06-26 NOTE — PROGRESS NOTES
"Subjective   Mason Parada is a 54 y.o. female.     History of Present Illness    Since the last visit, she has overall felt well.  She has Essential Hypertension and well controlled on current medication and anxiety which is well controlled on current regimen .  she has been compliant with current medications have reviewed them.  The patient denies medication side effects.  Will refill medications. /70   Pulse 71   Temp 97.7 °F (36.5 °C)   Resp 16   Ht 149.9 cm (59.02\")   Wt 67.1 kg (148 lb)   SpO2 99%   BMI 29.87 kg/m²     Results for orders placed or performed in visit on 05/11/20   Comprehensive Metabolic Panel   Result Value Ref Range    Glucose 130 (H) 74 - 124 mg/dL    BUN 17 6 - 20 mg/dL    Creatinine 0.77 0.60 - 1.10 mg/dL    Sodium 140 134 - 145 mmol/L    Potassium 4.3 3.5 - 4.7 mmol/L    Chloride 101 98 - 107 mmol/L    CO2 25.4 22.0 - 29.0 mmol/L    Calcium 10.0 8.5 - 10.2 mg/dL    Total Protein 7.5 6.3 - 8.0 g/dL    Albumin 4.80 3.50 - 5.20 g/dL    ALT (SGPT) 22 0 - 33 U/L    AST (SGOT) 22 0 - 32 U/L    Alkaline Phosphatase 70 38 - 116 U/L    Total Bilirubin 0.4 0.2 - 1.2 mg/dL    eGFR Non African Amer 79 >60 mL/min/1.73    Globulin 2.7 1.8 - 3.5 gm/dL    A/G Ratio 1.8 1.1 - 2.4 g/dL    BUN/Creatinine Ratio 22.1 7.3 - 30.0    Anion Gap 13.6 5.0 - 15.0 mmol/L   CBC Auto Differential   Result Value Ref Range    WBC 7.03 3.40 - 10.80 10*3/mm3    RBC 4.82 3.77 - 5.28 10*6/mm3    Hemoglobin 14.2 12.0 - 15.9 g/dL    Hematocrit 42.7 34.0 - 46.6 %    MCV 88.6 79.0 - 97.0 fL    MCH 29.5 26.6 - 33.0 pg    MCHC 33.3 31.5 - 35.7 g/dL    RDW 11.6 (L) 12.3 - 15.4 %    RDW-SD 37.2 37.0 - 54.0 fl    MPV 8.8 6.0 - 12.0 fL    Platelets 322 140 - 450 10*3/mm3    Neutrophil % 58.2 42.7 - 76.0 %    Lymphocyte % 31.9 19.6 - 45.3 %    Monocyte % 5.8 5.0 - 12.0 %    Eosinophil % 2.7 0.3 - 6.2 %    Basophil % 1.0 0.0 - 1.5 %    Immature Grans % 0.4 0.0 - 0.5 %    Neutrophils, Absolute 4.09 1.70 - 7.00 10*3/mm3    " Lymphocytes, Absolute 2.24 0.70 - 3.10 10*3/mm3    Monocytes, Absolute 0.41 0.10 - 0.90 10*3/mm3    Eosinophils, Absolute 0.19 0.00 - 0.40 10*3/mm3    Basophils, Absolute 0.07 0.00 - 0.20 10*3/mm3    Immature Grans, Absolute 0.03 0.00 - 0.05 10*3/mm3    nRBC 0.0 0.0 - 0.2 /100 WBC         The following portions of the patient's history were reviewed and updated as appropriate: allergies, current medications, past family history, past medical history, past social history, past surgical history and problem list.    Review of Systems   Constitutional: Negative for unexpected weight change.   Respiratory: Negative for shortness of breath.    Cardiovascular: Negative for chest pain and palpitations.   Endocrine: Negative for cold intolerance, heat intolerance, polydipsia, polyphagia and polyuria.   Psychiatric/Behavioral: Negative for behavioral problems.       Objective   Physical Exam   Constitutional: She is oriented to person, place, and time. She appears well-developed and well-nourished.   Cardiovascular: Normal rate and regular rhythm.   Pulmonary/Chest: Effort normal and breath sounds normal.   Neurological: She is alert and oriented to person, place, and time.   Psychiatric: She has a normal mood and affect. Judgment normal.   Nursing note and vitals reviewed.      Assessment/Plan   Mason was seen today for hypertension.    Diagnoses and all orders for this visit:    Anxiety    Essential hypertension  -     lisinopril-hydrochlorothiazide (PRINZIDE,ZESTORETIC) 20-25 MG per tablet; Take 1 tablet by mouth Daily. PLEASE MAKE APPOINTMENT    Allergic conjunctivitis of both eyes  -     ketotifen (ZADITOR) 0.025 % ophthalmic solution; Administer 1 drop to both eyes 2 (Two) Times a Day.

## 2020-09-25 ENCOUNTER — FLU SHOT (OUTPATIENT)
Dept: FAMILY MEDICINE CLINIC | Facility: CLINIC | Age: 55
End: 2020-09-25

## 2020-09-25 DIAGNOSIS — Z23 NEED FOR INFLUENZA VACCINATION: ICD-10-CM

## 2020-09-25 PROCEDURE — 90471 IMMUNIZATION ADMIN: CPT | Performed by: NURSE PRACTITIONER

## 2020-09-25 PROCEDURE — 90686 IIV4 VACC NO PRSV 0.5 ML IM: CPT | Performed by: NURSE PRACTITIONER

## 2020-10-02 ENCOUNTER — OFFICE VISIT (OUTPATIENT)
Dept: FAMILY MEDICINE CLINIC | Facility: CLINIC | Age: 55
End: 2020-10-02

## 2020-10-02 VITALS
TEMPERATURE: 97.3 F | WEIGHT: 150.6 LBS | BODY MASS INDEX: 30.36 KG/M2 | HEART RATE: 80 BPM | SYSTOLIC BLOOD PRESSURE: 140 MMHG | HEIGHT: 59 IN | DIASTOLIC BLOOD PRESSURE: 94 MMHG | RESPIRATION RATE: 16 BRPM | OXYGEN SATURATION: 98 %

## 2020-10-02 DIAGNOSIS — M54.2 CHRONIC NECK PAIN: ICD-10-CM

## 2020-10-02 DIAGNOSIS — G89.29 CHRONIC NECK PAIN: ICD-10-CM

## 2020-10-02 DIAGNOSIS — F41.9 ANXIETY: Primary | ICD-10-CM

## 2020-10-02 PROCEDURE — 99214 OFFICE O/P EST MOD 30 MIN: CPT | Performed by: NURSE PRACTITIONER

## 2020-10-02 RX ORDER — IBUPROFEN 800 MG/1
800 TABLET ORAL EVERY 6 HOURS PRN
Qty: 90 TABLET | Refills: 1 | Status: SHIPPED | OUTPATIENT
Start: 2020-10-02 | End: 2021-01-11 | Stop reason: SDUPTHER

## 2020-10-02 RX ORDER — BUSPIRONE HYDROCHLORIDE 10 MG/1
10 TABLET ORAL 3 TIMES DAILY
Qty: 90 TABLET | Refills: 11 | Status: SHIPPED | OUTPATIENT
Start: 2020-10-02 | End: 2021-04-23

## 2020-10-02 NOTE — PROGRESS NOTES
"Subjective   Mason Parada is a 55 y.o. female.     History of Present Illness   Mason Parada female 55 y.o., /94   Pulse 80   Temp 97.3 °F (36.3 °C)   Resp 16   Ht 149.9 cm (59.02\")   Wt 68.3 kg (150 lb 9.6 oz)   SpO2 98%   BMI 30.40 kg/m²   who presents today for follow up of Anxiety.  She reports impaired concentration, depressed mood and anxiety, unable to relax, panic feeling and irritable. Onset of symptoms was approximately several years ago.  She denies current suicidal and homicidal ideation. Risk factors are family history of anxiety and or depression, lifestyle of multiple roles, family situation/stress, job stress and prior diagnosis of anxiety.  Previous treatment includes Buspar and hydroxyzine.  She complains of the following medication side effects: none.        Reports chronic intermittent neck pain for which she is seeing chiropractor.  She states that helps some but she would like to have ibuprofen to take as needed.   The following portions of the patient's history were reviewed and updated as appropriate: allergies, current medications, past family history, past medical history, past social history, past surgical history and problem list.    Review of Systems   Constitutional: Negative for unexpected weight change.   Respiratory: Negative for shortness of breath.    Cardiovascular: Negative for chest pain and palpitations.   Musculoskeletal: Positive for neck pain and neck stiffness.   Psychiatric/Behavioral: Positive for agitation and decreased concentration. Negative for behavioral problems, self-injury, sleep disturbance and suicidal ideas. The patient is nervous/anxious.        Objective   Physical Exam  Vitals signs and nursing note reviewed.   Constitutional:       Appearance: She is well-developed.   Cardiovascular:      Rate and Rhythm: Normal rate and regular rhythm.   Pulmonary:      Effort: Pulmonary effort is normal.      Breath sounds: Normal breath sounds. "   Neurological:      Mental Status: She is alert and oriented to person, place, and time.   Psychiatric:         Attention and Perception: Attention normal.         Mood and Affect: Mood is anxious.         Speech: Speech normal.         Behavior: Behavior is agitated.         Thought Content: Thought content normal.         Cognition and Memory: Cognition normal.         Judgment: Judgment normal.         Assessment/Plan   Mason was seen today for anxiety.    Diagnoses and all orders for this visit:    Anxiety  -     busPIRone (BUSPAR) 10 MG tablet; Take 1 tablet by mouth 3 (Three) Times a Day.    Chronic neck pain  -     ibuprofen (ADVIL,MOTRIN) 800 MG tablet; Take 1 tablet by mouth Every 6 (Six) Hours As Needed for Mild Pain .

## 2020-10-20 DIAGNOSIS — D05.12 DUCTAL CARCINOMA IN SITU (DCIS) OF LEFT BREAST: Primary | ICD-10-CM

## 2020-10-26 ENCOUNTER — APPOINTMENT (OUTPATIENT)
Dept: LAB | Facility: HOSPITAL | Age: 55
End: 2020-10-26

## 2020-10-26 ENCOUNTER — TELEPHONE (OUTPATIENT)
Dept: ONCOLOGY | Facility: CLINIC | Age: 55
End: 2020-10-26

## 2020-10-26 NOTE — TELEPHONE ENCOUNTER
Pt called to cancel today's appt with Dr Bailey.    She is currently at [facility] with kidney stones.    She has tried to get through all morning to cancel appt.    She will call later to reschedule.    Best call back # [phone]

## 2020-12-04 ENCOUNTER — TELEPHONE (OUTPATIENT)
Dept: SURGERY | Facility: CLINIC | Age: 55
End: 2020-12-04

## 2020-12-04 NOTE — TELEPHONE ENCOUNTER
Patient missed MMG on 11/30/2020 @ [facility].    Called and left message with patient to let her know MMG needs to be done prior to appointment.     Appointment on 12/9/20 with Dr. Cat has been cancelled.    I will reschedule both appointments once patient calls back.

## 2021-01-08 ENCOUNTER — TELEPHONE (OUTPATIENT)
Dept: FAMILY MEDICINE CLINIC | Facility: CLINIC | Age: 56
End: 2021-01-08

## 2021-01-08 NOTE — TELEPHONE ENCOUNTER
PATIENT CALLED AND STATES THAT SHE WILL BE BRINGING HER DAUGHTER IN FOR AN APPOINTMENT TODAY 3:00 PM AND WOULD LIKE TO KNOW IF SHE COULD GET HER MEDICATION REFILLED WHILE SHE IS HERE TODAY INSTEAD OF COMING BACK ON 01/11/21 AT 10:15 AM FOR HER REFILL APPOINTMENT.

## 2021-01-11 ENCOUNTER — OFFICE VISIT (OUTPATIENT)
Dept: FAMILY MEDICINE CLINIC | Facility: CLINIC | Age: 56
End: 2021-01-11

## 2021-01-11 VITALS
DIASTOLIC BLOOD PRESSURE: 82 MMHG | HEIGHT: 59 IN | OXYGEN SATURATION: 98 % | HEART RATE: 86 BPM | TEMPERATURE: 98.1 F | BODY MASS INDEX: 31.04 KG/M2 | RESPIRATION RATE: 16 BRPM | WEIGHT: 154 LBS | SYSTOLIC BLOOD PRESSURE: 140 MMHG

## 2021-01-11 DIAGNOSIS — I10 ESSENTIAL HYPERTENSION: Primary | ICD-10-CM

## 2021-01-11 DIAGNOSIS — E55.9 VITAMIN D DEFICIENCY: ICD-10-CM

## 2021-01-11 DIAGNOSIS — F41.9 ANXIETY: ICD-10-CM

## 2021-01-11 DIAGNOSIS — G89.29 CHRONIC NECK PAIN: ICD-10-CM

## 2021-01-11 DIAGNOSIS — M54.2 CHRONIC NECK PAIN: ICD-10-CM

## 2021-01-11 PROCEDURE — 99214 OFFICE O/P EST MOD 30 MIN: CPT | Performed by: NURSE PRACTITIONER

## 2021-01-11 RX ORDER — IBUPROFEN 800 MG/1
800 TABLET ORAL EVERY 6 HOURS PRN
Qty: 90 TABLET | Refills: 1 | Status: SHIPPED | OUTPATIENT
Start: 2021-01-11 | End: 2021-04-23 | Stop reason: SDUPTHER

## 2021-01-11 RX ORDER — LISINOPRIL AND HYDROCHLOROTHIAZIDE 25; 20 MG/1; MG/1
1 TABLET ORAL DAILY
Qty: 30 TABLET | Refills: 5 | Status: SHIPPED | OUTPATIENT
Start: 2021-01-11 | End: 2021-07-30 | Stop reason: SDUPTHER

## 2021-01-11 RX ORDER — HYDROXYZINE HYDROCHLORIDE 25 MG/1
TABLET, FILM COATED ORAL
Qty: 60 TABLET | Refills: 0 | OUTPATIENT
Start: 2021-01-11

## 2021-01-11 NOTE — PROGRESS NOTES
"Subjective   Mason Parada is a 55 y.o. female.     History of Present Illness    Since the last visit, she has overall felt well.  She has Essential Hypertension and well controlled on current medication but she has been out of medication for a couple of days.  she has been compliant with current medications have reviewed them.  The patient denies medication side effects.  Will refill medications. /82   Pulse 86   Temp 98.1 °F (36.7 °C)   Resp 16   Ht 149.9 cm (59.02\")   Wt 69.9 kg (154 lb)   SpO2 98%   BMI 31.08 kg/m²     Results for orders placed or performed in visit on 05/11/20   Comprehensive Metabolic Panel    Specimen: Blood   Result Value Ref Range    Glucose 130 (H) 74 - 124 mg/dL    BUN 17 6 - 20 mg/dL    Creatinine 0.77 0.60 - 1.10 mg/dL    Sodium 140 134 - 145 mmol/L    Potassium 4.3 3.5 - 4.7 mmol/L    Chloride 101 98 - 107 mmol/L    CO2 25.4 22.0 - 29.0 mmol/L    Calcium 10.0 8.5 - 10.2 mg/dL    Total Protein 7.5 6.3 - 8.0 g/dL    Albumin 4.80 3.50 - 5.20 g/dL    ALT (SGPT) 22 0 - 33 U/L    AST (SGOT) 22 0 - 32 U/L    Alkaline Phosphatase 70 38 - 116 U/L    Total Bilirubin 0.4 0.2 - 1.2 mg/dL    eGFR Non African Amer 79 >60 mL/min/1.73    Globulin 2.7 1.8 - 3.5 gm/dL    A/G Ratio 1.8 1.1 - 2.4 g/dL    BUN/Creatinine Ratio 22.1 7.3 - 30.0    Anion Gap 13.6 5.0 - 15.0 mmol/L   CBC Auto Differential    Specimen: Blood   Result Value Ref Range    WBC 7.03 3.40 - 10.80 10*3/mm3    RBC 4.82 3.77 - 5.28 10*6/mm3    Hemoglobin 14.2 12.0 - 15.9 g/dL    Hematocrit 42.7 34.0 - 46.6 %    MCV 88.6 79.0 - 97.0 fL    MCH 29.5 26.6 - 33.0 pg    MCHC 33.3 31.5 - 35.7 g/dL    RDW 11.6 (L) 12.3 - 15.4 %    RDW-SD 37.2 37.0 - 54.0 fl    MPV 8.8 6.0 - 12.0 fL    Platelets 322 140 - 450 10*3/mm3    Neutrophil % 58.2 42.7 - 76.0 %    Lymphocyte % 31.9 19.6 - 45.3 %    Monocyte % 5.8 5.0 - 12.0 %    Eosinophil % 2.7 0.3 - 6.2 %    Basophil % 1.0 0.0 - 1.5 %    Immature Grans % 0.4 0.0 - 0.5 %    Neutrophils, " Absolute 4.09 1.70 - 7.00 10*3/mm3    Lymphocytes, Absolute 2.24 0.70 - 3.10 10*3/mm3    Monocytes, Absolute 0.41 0.10 - 0.90 10*3/mm3    Eosinophils, Absolute 0.19 0.00 - 0.40 10*3/mm3    Basophils, Absolute 0.07 0.00 - 0.20 10*3/mm3    Immature Grans, Absolute 0.03 0.00 - 0.05 10*3/mm3    nRBC 0.0 0.0 - 0.2 /100 WBC     She feels that anxiety is doing well with medication as needed.     The following portions of the patient's history were reviewed and updated as appropriate: allergies, current medications, past family history, past medical history, past social history, past surgical history and problem list.    Review of Systems   Constitutional: Negative for unexpected weight change.   Respiratory: Negative for shortness of breath.    Cardiovascular: Negative for chest pain and palpitations.   Endocrine: Negative for cold intolerance, heat intolerance, polydipsia, polyphagia and polyuria.   Psychiatric/Behavioral: Negative for behavioral problems.       Objective   Physical Exam  Vitals signs and nursing note reviewed.   Constitutional:       Appearance: Normal appearance. She is well-developed.   Cardiovascular:      Rate and Rhythm: Normal rate and regular rhythm.   Pulmonary:      Effort: Pulmonary effort is normal.      Breath sounds: Normal breath sounds.   Neurological:      Mental Status: She is alert and oriented to person, place, and time.   Psychiatric:         Mood and Affect: Mood normal.         Behavior: Behavior normal.         Thought Content: Thought content normal.         Judgment: Judgment normal.         Assessment/Plan   Diagnoses and all orders for this visit:    1. Essential hypertension (Primary)  -     lisinopril-hydrochlorothiazide (PRINZIDE,ZESTORETIC) 20-25 MG per tablet; Take 1 tablet by mouth Daily. PLEASE MAKE APPOINTMENT  Dispense: 30 tablet; Refill: 5  -     Comprehensive metabolic panel  -     Lipid panel  -     TSH  -     Vitamin D 25 Hydroxy    2. Chronic neck pain  -      ibuprofen (ADVIL,MOTRIN) 800 MG tablet; Take 1 tablet by mouth Every 6 (Six) Hours As Needed for Mild Pain .  Dispense: 90 tablet; Refill: 1    3. Vitamin D deficiency  -     Comprehensive metabolic panel  -     Lipid panel  -     TSH  -     Vitamin D 25 Hydroxy

## 2021-01-15 DIAGNOSIS — F41.9 ANXIETY: ICD-10-CM

## 2021-01-15 RX ORDER — HYDROXYZINE HYDROCHLORIDE 25 MG/1
TABLET, FILM COATED ORAL
Qty: 60 TABLET | Refills: 0 | OUTPATIENT
Start: 2021-01-15

## 2021-01-21 DIAGNOSIS — F41.9 ANXIETY: ICD-10-CM

## 2021-01-21 RX ORDER — HYDROXYZINE HYDROCHLORIDE 25 MG/1
25 TABLET, FILM COATED ORAL EVERY 8 HOURS PRN
Qty: 60 TABLET | Refills: 10 | Status: SHIPPED | OUTPATIENT
Start: 2021-01-21 | End: 2022-02-15

## 2021-01-21 NOTE — TELEPHONE ENCOUNTER
PATIENT CALLING FOR REFILL FOR  HYDROXYZINE . PLEASE SENT TO [facility] [place]. THANKS  [phone] NO REFILLS

## 2021-02-19 ENCOUNTER — OFFICE VISIT (OUTPATIENT)
Dept: FAMILY MEDICINE CLINIC | Facility: CLINIC | Age: 56
End: 2021-02-19

## 2021-02-19 VITALS
HEIGHT: 59 IN | TEMPERATURE: 97 F | WEIGHT: 153 LBS | RESPIRATION RATE: 16 BRPM | DIASTOLIC BLOOD PRESSURE: 80 MMHG | BODY MASS INDEX: 30.84 KG/M2 | OXYGEN SATURATION: 97 % | HEART RATE: 79 BPM | SYSTOLIC BLOOD PRESSURE: 120 MMHG

## 2021-02-19 DIAGNOSIS — Z02.1 PRE-EMPLOYMENT HEALTH SCREENING EXAMINATION: Primary | ICD-10-CM

## 2021-02-19 PROCEDURE — 99213 OFFICE O/P EST LOW 20 MIN: CPT | Performed by: NURSE PRACTITIONER

## 2021-02-19 NOTE — PROGRESS NOTES
Subjective   Mason Parada is a 55 y.o. female.     History of Present Illness   Patient presents to office for immunization titers as required by her new job at [employer].  She needs MMR, varicella and hep B.  She will also need quantiferon TB test.   The following portions of the patient's history were reviewed and updated as appropriate: allergies, current medications, past family history, past medical history, past social history, past surgical history and problem list.    Review of Systems   Constitutional: Negative for unexpected weight change.   Respiratory: Negative for shortness of breath.    Cardiovascular: Negative for chest pain and palpitations.   Psychiatric/Behavioral: Negative for behavioral problems.       Objective   Physical Exam  Vitals signs and nursing note reviewed.   Constitutional:       Appearance: Normal appearance. She is well-developed.   Cardiovascular:      Rate and Rhythm: Normal rate.   Pulmonary:      Effort: Pulmonary effort is normal.   Neurological:      Mental Status: She is alert and oriented to person, place, and time.   Psychiatric:         Mood and Affect: Mood normal.         Behavior: Behavior normal.         Thought Content: Thought content normal.         Judgment: Judgment normal.         Assessment/Plan   Diagnoses and all orders for this visit:    1. Pre-employment health screening examination (Primary)  -     Hepatitis B Surface Antibody  -     Measles / Mumps / Rubella Immunity  -     Varicella zoster antibody, IgG  -     QuantiFERON TB Gold

## 2021-02-23 LAB
25(OH)D3+25(OH)D2 SERPL-MCNC: 24.8 NG/ML (ref 30–100)
ALBUMIN SERPL-MCNC: 4.4 G/DL (ref 3.8–4.9)
ALBUMIN/GLOB SERPL: 1.8 {RATIO} (ref 1.2–2.2)
ALP SERPL-CCNC: 81 IU/L (ref 39–117)
ALT SERPL-CCNC: 26 IU/L (ref 0–32)
AST SERPL-CCNC: 23 IU/L (ref 0–40)
BILIRUB SERPL-MCNC: <0.2 MG/DL (ref 0–1.2)
BUN SERPL-MCNC: 20 MG/DL (ref 6–24)
BUN/CREAT SERPL: 26 (ref 9–23)
CALCIUM SERPL-MCNC: 9.6 MG/DL (ref 8.7–10.2)
CHLORIDE SERPL-SCNC: 102 MMOL/L (ref 96–106)
CHOLEST SERPL-MCNC: 230 MG/DL (ref 100–199)
CO2 SERPL-SCNC: 26 MMOL/L (ref 20–29)
CREAT SERPL-MCNC: 0.76 MG/DL (ref 0.57–1)
GAMMA INTERFERON BACKGROUND BLD IA-ACNC: 0.34 IU/ML
GLOBULIN SER CALC-MCNC: 2.5 G/DL (ref 1.5–4.5)
GLUCOSE SERPL-MCNC: 107 MG/DL (ref 65–99)
HBV SURFACE AB SER QL: NON REACTIVE
HDLC SERPL-MCNC: 34 MG/DL
LDLC SERPL CALC-MCNC: 133 MG/DL (ref 0–99)
M TB IFN-G BLD-IMP: NEGATIVE
M TB IFN-G CD4+ BCKGRND COR BLD-ACNC: 0.13 IU/ML
M TB IFN-G CD4+CD8+ BCKGRND COR BLD-ACNC: 0.12 IU/ML
MEV IGG SER IA-ACNC: >300 AU/ML
MITOGEN IGNF BLD-ACNC: >10 IU/ML
MUV IGG SER IA-ACNC: 13.7 AU/ML
POTASSIUM SERPL-SCNC: 3.6 MMOL/L (ref 3.5–5.2)
PROT SERPL-MCNC: 6.9 G/DL (ref 6–8.5)
QUANTIFERON INCUBATION: NORMAL
RUBV IGG SERPL IA-ACNC: 2.34 INDEX
SERVICE CMNT-IMP: NORMAL
SODIUM SERPL-SCNC: 141 MMOL/L (ref 134–144)
TRIGL SERPL-MCNC: 348 MG/DL (ref 0–149)
TSH SERPL DL<=0.005 MIU/L-ACNC: 1.27 UIU/ML (ref 0.45–4.5)
VLDLC SERPL CALC-MCNC: 63 MG/DL (ref 5–40)
VZV IGG SER IA-ACNC: 2675 INDEX

## 2021-02-25 ENCOUNTER — TELEPHONE (OUTPATIENT)
Dept: FAMILY MEDICINE CLINIC | Facility: CLINIC | Age: 56
End: 2021-02-25

## 2021-02-25 NOTE — TELEPHONE ENCOUNTER
Patient called in wanting to know if she could  a copy of her test results and vaccines?    Please call back to emery @ [phone]

## 2021-03-26 ENCOUNTER — OFFICE VISIT (OUTPATIENT)
Dept: FAMILY MEDICINE CLINIC | Facility: CLINIC | Age: 56
End: 2021-03-26

## 2021-03-26 VITALS
RESPIRATION RATE: 16 BRPM | DIASTOLIC BLOOD PRESSURE: 84 MMHG | OXYGEN SATURATION: 98 % | TEMPERATURE: 97.3 F | SYSTOLIC BLOOD PRESSURE: 120 MMHG | WEIGHT: 152 LBS | BODY MASS INDEX: 30.64 KG/M2 | HEIGHT: 59 IN | HEART RATE: 78 BPM

## 2021-03-26 DIAGNOSIS — G89.29 CHRONIC RIGHT SHOULDER PAIN: Primary | ICD-10-CM

## 2021-03-26 DIAGNOSIS — G89.29 CHRONIC NECK PAIN: ICD-10-CM

## 2021-03-26 DIAGNOSIS — M25.511 CHRONIC RIGHT SHOULDER PAIN: Primary | ICD-10-CM

## 2021-03-26 DIAGNOSIS — M54.2 CHRONIC NECK PAIN: ICD-10-CM

## 2021-03-26 PROCEDURE — 99214 OFFICE O/P EST MOD 30 MIN: CPT | Performed by: NURSE PRACTITIONER

## 2021-03-26 RX ORDER — CYCLOBENZAPRINE HCL 10 MG
10 TABLET ORAL 2 TIMES DAILY PRN
Qty: 45 TABLET | Refills: 0 | Status: SHIPPED | OUTPATIENT
Start: 2021-03-26 | End: 2021-04-10

## 2021-03-26 RX ORDER — METHYLPREDNISOLONE 4 MG/1
TABLET ORAL
Qty: 21 TABLET | Refills: 0 | Status: SHIPPED | OUTPATIENT
Start: 2021-03-26 | End: 2021-04-05

## 2021-03-26 NOTE — PROGRESS NOTES
Subjective   Mason Parada is a 55 y.o. female.     History of Present Illness   Mason Parada 55 y.o. female who presents for evaluation of chronic neck stiffness.She has had issues off and on with this for years.  She has seen a chiropractor and states it helped some but she always has soreness, especially in her right trapezius area.     She states she recently started to have right shoulder pain and aching, burning in her upper arm.  She states she is noticing she cannot hold her arm up for long periods and has some pain with shoulder ROM.  She reports pain with abduction or extension more than 90 degrees. She is unable to reach behind.  She denies known injury.       The following portions of the patient's history were reviewed and updated as appropriate: allergies, current medications, past family history, past medical history, past social history, past surgical history and problem list.    Review of Systems   Constitutional: Negative for unexpected weight change.   Respiratory: Negative for shortness of breath.    Cardiovascular: Negative for chest pain and palpitations.   Musculoskeletal: Positive for arthralgias, neck pain and neck stiffness. Negative for joint swelling.   Neurological: Positive for weakness. Negative for numbness.   Psychiatric/Behavioral: Negative for behavioral problems.       Objective   Physical Exam  Vitals and nursing note reviewed.   Constitutional:       Appearance: Normal appearance. She is well-developed.   Neck:     Pulmonary:      Effort: Pulmonary effort is normal.   Musculoskeletal:      Right shoulder: Tenderness present. No swelling, deformity, effusion, laceration or bony tenderness. Decreased range of motion. Decreased strength.      Cervical back: Full passive range of motion without pain. Muscular tenderness present. No spinous process tenderness.   Neurological:      Mental Status: She is alert and oriented to person, place, and time.   Psychiatric:         Mood  and Affect: Mood normal.         Behavior: Behavior normal.         Thought Content: Thought content normal.         Judgment: Judgment normal.         Assessment/Plan   Diagnoses and all orders for this visit:    1. Chronic right shoulder pain (Primary)  -     methylPREDNISolone (MEDROL) 4 MG dose pack; Take as directed on package instructions.  Dispense: 21 tablet; Refill: 0  -     cyclobenzaprine (FLEXERIL) 10 MG tablet; Take 1 tablet by mouth 2 (Two) Times a Day As Needed for Muscle Spasms for up to 15 days.  Dispense: 45 tablet; Refill: 0  -     Ambulatory Referral to Physical Therapy Evaluate and treat  -     Ambulatory Referral to Orthopedic Surgery    2. Chronic neck pain  -     methylPREDNISolone (MEDROL) 4 MG dose pack; Take as directed on package instructions.  Dispense: 21 tablet; Refill: 0  -     cyclobenzaprine (FLEXERIL) 10 MG tablet; Take 1 tablet by mouth 2 (Two) Times a Day As Needed for Muscle Spasms for up to 15 days.  Dispense: 45 tablet; Refill: 0  -     Ambulatory Referral to Physical Therapy Evaluate and treat

## 2021-04-05 ENCOUNTER — OFFICE VISIT (OUTPATIENT)
Dept: FAMILY MEDICINE CLINIC | Facility: CLINIC | Age: 56
End: 2021-04-05

## 2021-04-05 VITALS
DIASTOLIC BLOOD PRESSURE: 86 MMHG | SYSTOLIC BLOOD PRESSURE: 133 MMHG | RESPIRATION RATE: 16 BRPM | BODY MASS INDEX: 30.04 KG/M2 | HEART RATE: 96 BPM | OXYGEN SATURATION: 97 % | WEIGHT: 149 LBS | TEMPERATURE: 97.8 F | HEIGHT: 59 IN

## 2021-04-05 DIAGNOSIS — H10.31 ACUTE CONJUNCTIVITIS OF RIGHT EYE, UNSPECIFIED ACUTE CONJUNCTIVITIS TYPE: Primary | ICD-10-CM

## 2021-04-05 PROCEDURE — 99213 OFFICE O/P EST LOW 20 MIN: CPT | Performed by: FAMILY MEDICINE

## 2021-04-05 NOTE — PROGRESS NOTES
Subjective   Chief Complaint:   Chief Complaint   Patient presents with   • Pain and irritation in right eye       History of Present Illness This patient comes in today with a conjunctivitis of the right eye it started just this morning.  This is a severe conjunctivitis. She has redness and irritated her pupils are reactive reactive to light and accommodation she is got drainage in the eye is very injected very red.  This and its not in the left eye yet again she is got a pretty moderate to severe conjunctivitis in the right eye is very red injected and exudate in her just started this morning.  So again she is got a severe conjunctivitis very red and exudate in the right eye and I am going to cover her from her work on this week on Monday Tuesday and Wednesday.  If she needs other time off she can call me.  And I will see her back if it still not any better by Wednesday I definitely want to see her again.  Have her instructions of isolation and trying to keep her hands away from other people's eyes and to hold a warm compress to there the more the better and then I am going to give her Neosporin Ophthalmic Solution 2 drops both eyes 4 times daily.  Gets worse during she can call me here tomorrow.   Neosporin ophthalmic solution  Right eye     Plan:  I have called her neosporin ophthalmic solution in to her pharmacy. See me in two days if not improved.       Past Medical History:   Diagnosis Date   • Anxiety    • Carpal tunnel syndrome of right wrist    • Chest pain radiating to jaw     STRESS TEST OK 2018, [place] EPIC - D/T STRESS, PANIC    • GERD (gastroesophageal reflux disease)    • H/O Septicemia (CMS/[facility]) 2017    H/O Kidney stones   • Heart palpitations    • History of kidney stones 2017   • Hyperlipidemia    • Hypertension         Mason IVERSON Karma 55 y.o. female who presents today for Medical Management of the below listed issues and medication refills.    ICD-10-CM ICD-9-CM   1. Acute conjunctivitis of right  "eye, unspecified acute conjunctivitis type  H10.31 372.00        she has a problem list of   Patient Active Problem List   Diagnosis   • Essential hypertension   • Gastroesophageal reflux disease without esophagitis   • Seasonal allergies   • Ductal carcinoma in situ (DCIS) of left breast   • Status post mastectomy, left   • Family history of cervical cancer   • History of kidney stones   • Vitamin D deficiency   • Osteoarthritis of left hip   .    she has been compliant with   Current Outpatient Medications:   •  Acetaminophen (TYLENOL ARTHRITIS PAIN PO), Take 500 mg by mouth 2 (Two) Times a Day., Disp: , Rfl:   •  Cholecalciferol (VITAMIN D) 1000 units tablet, Take 1,000 Units by mouth Daily. HOLDING FOR SURGERY, Disp: , Rfl:   •  cyclobenzaprine (FLEXERIL) 10 MG tablet, Take 1 tablet by mouth 2 (Two) Times a Day As Needed for Muscle Spasms for up to 15 days., Disp: 45 tablet, Rfl: 0  •  hydrOXYzine (ATARAX) 25 MG tablet, Take 1 tablet by mouth Every 8 (Eight) Hours As Needed for Itching., Disp: 60 tablet, Rfl: 10  •  ibuprofen (ADVIL,MOTRIN) 800 MG tablet, Take 1 tablet by mouth Every 6 (Six) Hours As Needed for Mild Pain ., Disp: 90 tablet, Rfl: 1  •  ketotifen (ZADITOR) 0.025 % ophthalmic solution, Administer 1 drop to both eyes 2 (Two) Times a Day., Disp: 1 each, Rfl: 5  •  lisinopril-hydrochlorothiazide (PRINZIDE,ZESTORETIC) 20-25 MG per tablet, Take 1 tablet by mouth Daily. PLEASE MAKE APPOINTMENT, Disp: 30 tablet, Rfl: 5  •  tamoxifen (NOLVADEX) 20 MG chemo tablet, TAKE ONE TABLET BY MOUTH DAILY, Disp: 90 tablet, Rfl: 1  •  busPIRone (BUSPAR) 10 MG tablet, Take 1 tablet by mouth 3 (Three) Times a Day., Disp: 90 tablet, Rfl: 11.  she denies medication side effects.        /86   Pulse 96   Temp 97.8 °F (36.6 °C)   Resp 16   Ht 149.9 cm (59.02\")   Wt 67.6 kg (149 lb)   SpO2 97%   BMI 30.07 kg/m²     Results for orders placed or performed in visit on 01/11/21   Comprehensive metabolic panel    " Specimen: Blood   Result Value Ref Range    Glucose 107 (H) 65 - 99 mg/dL    BUN 20 6 - 24 mg/dL    Creatinine 0.76 0.57 - 1.00 mg/dL    eGFR Non African Am 90 >59 mL/min/1.73    eGFR African Am 104 >59 mL/min/1.73    BUN/Creatinine Ratio 26 (H) 9 - 23    Sodium 141 134 - 144 mmol/L    Potassium 3.6 3.5 - 5.2 mmol/L    Chloride 102 96 - 106 mmol/L    Total CO2 26 20 - 29 mmol/L    Calcium 9.6 8.7 - 10.2 mg/dL    Total Protein 6.9 6.0 - 8.5 g/dL    Albumin 4.4 3.8 - 4.9 g/dL    Globulin 2.5 1.5 - 4.5 g/dL    A/G Ratio 1.8 1.2 - 2.2    Total Bilirubin <0.2 0.0 - 1.2 mg/dL    Alkaline Phosphatase 81 39 - 117 IU/L    AST (SGOT) 23 0 - 40 IU/L    ALT (SGPT) 26 0 - 32 IU/L   Lipid panel    Specimen: Blood   Result Value Ref Range    Total Cholesterol 230 (H) 100 - 199 mg/dL    Triglycerides 348 (H) 0 - 149 mg/dL    HDL Cholesterol 34 (L) >39 mg/dL    VLDL Cholesterol Bala 63 (H) 5 - 40 mg/dL    LDL Chol Calc (NIH) 133 (H) 0 - 99 mg/dL   TSH    Specimen: Blood   Result Value Ref Range    TSH 1.270 0.450 - 4.500 uIU/mL   Vitamin D 25 Hydroxy    Specimen: Blood   Result Value Ref Range    25 Hydroxy, Vitamin D 24.8 (L) 30.0 - 100.0 ng/mL   Measles / Mumps / Rubella Immunity   Result Value Ref Range    Rubella Antibodies, IgG 2.34 Immune >0.99 index    Rubeola IgG >300.0 Immune >16.4 AU/mL    Mumps IgG 13.7 Immune >10.9 AU/mL   QuantiFERON TB Gold   Result Value Ref Range    QuantiFERON Incubation Incubation performed.     QuantiFERON Criteria Comment     QUANTIFERON TB1 AG VALUE 0.13 IU/mL    QUANTIFERON TB2 AG VALUE 0.12 IU/mL    QuantiFERON Nil Value 0.34 IU/mL    QuantiFERON Mitogen Value >10.00 IU/mL    QUANTIFERON-TB GOLD PLUS Negative Negative   Hepatitis B Surface Antibody   Result Value Ref Range    Hep B S Ab Non Reactive    Varicella Zoster Antibody, IgG   Result Value Ref Range    Varicella IgG 2,675 Immune >165 index       The following portions of the patient's history were reviewed and updated as appropriate:  allergies, current medications, past family history, past medical history, past social history, past surgical history and problem list.      she has a history of   Patient Active Problem List   Diagnosis   • Essential hypertension   • Gastroesophageal reflux disease without esophagitis   • Seasonal allergies   • Ductal carcinoma in situ (DCIS) of left breast   • Status post mastectomy, left   • Family history of cervical cancer   • History of kidney stones   • Vitamin D deficiency   • Osteoarthritis of left hip       Review of Systems   Constitutional: Negative for activity change, appetite change and unexpected weight change.   HENT: Positive for congestion.    Eyes: Positive for photophobia, pain, discharge and itching. Negative for visual disturbance.        Got conjunctivitis and exudate in the right eye.  There is redness injection and exudate.   Respiratory: Negative for chest tightness and shortness of breath.    Cardiovascular: Negative for chest pain and palpitations.   Skin: Negative for color change.   Neurological: Negative for syncope and speech difficulty.   Psychiatric/Behavioral: Negative for confusion and decreased concentration.   All other systems reviewed and are negative.      Objective   Physical Exam  Vitals and nursing note reviewed.   Constitutional:       General: She is not in acute distress.     Appearance: She is well-developed. She is obese. She is not ill-appearing, toxic-appearing or diaphoretic.   HENT:      Head: Atraumatic.      Right Ear: External ear normal.      Left Ear: External ear normal.   Eyes:      General:         Right eye: Discharge present.         Left eye: No discharge.      Extraocular Movements: Extraocular movements intact.      Pupils: Pupils are equal, round, and reactive to light.      Comments: Has conjunctivitis in the right eye and its there with redness and swelling and edema and exudate.   Cardiovascular:      Rate and Rhythm: Normal rate and regular  rhythm.   Pulmonary:      Effort: Pulmonary effort is normal.      Breath sounds: Normal breath sounds.   Abdominal:      General: Bowel sounds are normal.      Palpations: Abdomen is soft.   Musculoskeletal:         General: No signs of injury.   Neurological:      Mental Status: She is alert and oriented to person, place, and time.   Psychiatric:         Mood and Affect: Mood normal.         Behavior: Behavior normal.         Thought Content: Thought content normal.         Judgment: Judgment normal.         Assessment/Plan   Diagnoses and all orders for this visit:    1. Acute conjunctivitis of right eye, unspecified acute conjunctivitis type (Primary)        Labs results discussed in detail with the patient, if no recent labs were done, order placed today.  Plan to update vaccines if needed today.  I  reviewed health maintenance with the patient as part of my preventative care plan. I discussed preventative counseling with the patient in detail.

## 2021-04-09 ENCOUNTER — TELEPHONE (OUTPATIENT)
Dept: PHYSICAL THERAPY | Facility: CLINIC | Age: 56
End: 2021-04-09
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
## 2021-04-16 ENCOUNTER — OFFICE VISIT (OUTPATIENT)
Dept: ORTHOPEDIC SURGERY | Facility: CLINIC | Age: 56
End: 2021-04-16

## 2021-04-16 VITALS — BODY MASS INDEX: 30.64 KG/M2 | HEIGHT: 59 IN | TEMPERATURE: 96.1 F | WEIGHT: 152 LBS

## 2021-04-16 DIAGNOSIS — S46.011A TRAUMATIC TEAR OF RIGHT ROTATOR CUFF, UNSPECIFIED TEAR EXTENT, INITIAL ENCOUNTER: ICD-10-CM

## 2021-04-16 DIAGNOSIS — M25.511 RIGHT SHOULDER PAIN, UNSPECIFIED CHRONICITY: Primary | ICD-10-CM

## 2021-04-16 PROCEDURE — 99203 OFFICE O/P NEW LOW 30 MIN: CPT | Performed by: ORTHOPAEDIC SURGERY

## 2021-04-16 PROCEDURE — 73030 X-RAY EXAM OF SHOULDER: CPT | Performed by: ORTHOPAEDIC SURGERY

## 2021-04-23 ENCOUNTER — OFFICE VISIT (OUTPATIENT)
Dept: FAMILY MEDICINE CLINIC | Facility: CLINIC | Age: 56
End: 2021-04-23

## 2021-04-23 ENCOUNTER — TREATMENT (OUTPATIENT)
Dept: PHYSICAL THERAPY | Facility: CLINIC | Age: 56
End: 2021-04-23

## 2021-04-23 VITALS
OXYGEN SATURATION: 98 % | WEIGHT: 152 LBS | BODY MASS INDEX: 30.64 KG/M2 | SYSTOLIC BLOOD PRESSURE: 120 MMHG | HEIGHT: 59 IN | TEMPERATURE: 97.7 F | DIASTOLIC BLOOD PRESSURE: 78 MMHG | RESPIRATION RATE: 16 BRPM | HEART RATE: 58 BPM

## 2021-04-23 DIAGNOSIS — S16.1XXD STRAIN OF NECK MUSCLE, SUBSEQUENT ENCOUNTER: ICD-10-CM

## 2021-04-23 DIAGNOSIS — S46.011D TRAUMATIC TEAR OF RIGHT ROTATOR CUFF, UNSPECIFIED TEAR EXTENT, SUBSEQUENT ENCOUNTER: Primary | ICD-10-CM

## 2021-04-23 DIAGNOSIS — S46.911D STRAIN OF RIGHT SHOULDER, SUBSEQUENT ENCOUNTER: ICD-10-CM

## 2021-04-23 DIAGNOSIS — G89.29 CHRONIC NECK PAIN: ICD-10-CM

## 2021-04-23 DIAGNOSIS — M54.2 CHRONIC NECK PAIN: ICD-10-CM

## 2021-04-23 PROCEDURE — 97530 THERAPEUTIC ACTIVITIES: CPT | Performed by: PHYSICAL THERAPIST

## 2021-04-23 PROCEDURE — 99213 OFFICE O/P EST LOW 20 MIN: CPT | Performed by: NURSE PRACTITIONER

## 2021-04-23 PROCEDURE — 97110 THERAPEUTIC EXERCISES: CPT | Performed by: PHYSICAL THERAPIST

## 2021-04-23 PROCEDURE — 97162 PT EVAL MOD COMPLEX 30 MIN: CPT | Performed by: PHYSICAL THERAPIST

## 2021-04-23 RX ORDER — TIZANIDINE HYDROCHLORIDE 4 MG/1
4 CAPSULE, GELATIN COATED ORAL 3 TIMES DAILY
Qty: 90 CAPSULE | Refills: 1 | Status: SHIPPED | OUTPATIENT
Start: 2021-04-23 | End: 2021-07-30

## 2021-04-23 RX ORDER — TAMOXIFEN CITRATE 20 MG/1
TABLET ORAL
Qty: 90 TABLET | Refills: 0 | Status: SHIPPED | OUTPATIENT
Start: 2021-04-23 | End: 2021-05-07 | Stop reason: SDUPTHER

## 2021-04-23 RX ORDER — IBUPROFEN 800 MG/1
800 TABLET ORAL EVERY 8 HOURS PRN
Qty: 90 TABLET | Refills: 1 | Status: SHIPPED | OUTPATIENT
Start: 2021-04-23 | End: 2021-07-30 | Stop reason: SDUPTHER

## 2021-04-23 NOTE — PROGRESS NOTES
Subjective   Mason Parada is a 55 y.o. female.     History of Present Illness   Patient presents to office for f/u on chronic neck pain and right shoulder pain.  She is seeing physical therapy which has helped some but still having trouble sleeping at night.  She has seen Dr. Conde for workup and has MRI scheduled in May for right shoulder.  She states ibuprofen helps but she has only been taking it twice per day.  She does not feel that cyclobenzaprine has helped.   The following portions of the patient's history were reviewed and updated as appropriate: allergies, current medications, past family history, past medical history, past social history, past surgical history and problem list.    Review of Systems   Constitutional: Negative for unexpected weight change.   Respiratory: Negative for shortness of breath.    Cardiovascular: Negative for chest pain and palpitations.   Musculoskeletal: Positive for arthralgias, neck pain and neck stiffness.   Neurological: Positive for weakness. Negative for numbness.   Psychiatric/Behavioral: Negative for behavioral problems.       Objective   Physical Exam  Vitals and nursing note reviewed.   Constitutional:       Appearance: Normal appearance. She is well-developed.   Pulmonary:      Effort: Pulmonary effort is normal.   Musculoskeletal:      Right shoulder: Tenderness present. Decreased strength.      Cervical back: Muscular tenderness present.   Neurological:      Mental Status: She is alert and oriented to person, place, and time.   Psychiatric:         Mood and Affect: Mood normal.         Behavior: Behavior normal.         Thought Content: Thought content normal.         Judgment: Judgment normal.         Assessment/Plan   Diagnoses and all orders for this visit:    1. Chronic neck pain  -     ibuprofen (ADVIL,MOTRIN) 800 MG tablet; Take 1 tablet by mouth Every 8 (Eight) Hours As Needed for Mild Pain .  Dispense: 90 tablet; Refill: 1  -     TiZANidine (Zanaflex) 4  MG capsule; Take 1 capsule by mouth 3 (Three) Times a Day.  Dispense: 90 capsule; Refill: 1
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
## 2021-05-07 ENCOUNTER — OFFICE VISIT (OUTPATIENT)
Dept: ONCOLOGY | Facility: CLINIC | Age: 56
End: 2021-05-07

## 2021-05-07 ENCOUNTER — TREATMENT (OUTPATIENT)
Dept: PHYSICAL THERAPY | Facility: CLINIC | Age: 56
End: 2021-05-07

## 2021-05-07 ENCOUNTER — LAB (OUTPATIENT)
Dept: LAB | Facility: HOSPITAL | Age: 56
End: 2021-05-07

## 2021-05-07 VITALS
HEART RATE: 81 BPM | RESPIRATION RATE: 16 BRPM | TEMPERATURE: 97.5 F | BODY MASS INDEX: 29.84 KG/M2 | WEIGHT: 152 LBS | OXYGEN SATURATION: 97 % | DIASTOLIC BLOOD PRESSURE: 85 MMHG | SYSTOLIC BLOOD PRESSURE: 144 MMHG | HEIGHT: 60 IN

## 2021-05-07 DIAGNOSIS — S46.011D TRAUMATIC TEAR OF RIGHT ROTATOR CUFF, UNSPECIFIED TEAR EXTENT, SUBSEQUENT ENCOUNTER: Primary | ICD-10-CM

## 2021-05-07 DIAGNOSIS — Z80.49 FAMILY HISTORY OF CERVICAL CANCER: ICD-10-CM

## 2021-05-07 DIAGNOSIS — D05.12 DUCTAL CARCINOMA IN SITU (DCIS) OF LEFT BREAST: Primary | ICD-10-CM

## 2021-05-07 DIAGNOSIS — D05.12 DUCTAL CARCINOMA IN SITU (DCIS) OF LEFT BREAST: ICD-10-CM

## 2021-05-07 DIAGNOSIS — S16.1XXD STRAIN OF NECK MUSCLE, SUBSEQUENT ENCOUNTER: ICD-10-CM

## 2021-05-07 DIAGNOSIS — I15.9 SECONDARY HYPERTENSION: ICD-10-CM

## 2021-05-07 DIAGNOSIS — S46.911D STRAIN OF RIGHT SHOULDER, SUBSEQUENT ENCOUNTER: ICD-10-CM

## 2021-05-07 DIAGNOSIS — E55.9 VITAMIN D DEFICIENCY: ICD-10-CM

## 2021-05-07 LAB
ALBUMIN SERPL-MCNC: 4.4 G/DL (ref 3.5–5.2)
ALBUMIN/GLOB SERPL: 1.6 G/DL (ref 1.1–2.4)
ALP SERPL-CCNC: 82 U/L (ref 38–116)
ALT SERPL W P-5'-P-CCNC: 21 U/L (ref 0–33)
ANION GAP SERPL CALCULATED.3IONS-SCNC: 11.4 MMOL/L (ref 5–15)
AST SERPL-CCNC: 19 U/L (ref 0–32)
BASOPHILS # BLD AUTO: 0.08 10*3/MM3 (ref 0–0.2)
BASOPHILS NFR BLD AUTO: 1 % (ref 0–1.5)
BILIRUB SERPL-MCNC: 0.3 MG/DL (ref 0.2–1.2)
BUN SERPL-MCNC: 22 MG/DL (ref 6–20)
BUN/CREAT SERPL: 25 (ref 7.3–30)
CALCIUM SPEC-SCNC: 9.7 MG/DL (ref 8.5–10.2)
CHLORIDE SERPL-SCNC: 103 MMOL/L (ref 98–107)
CO2 SERPL-SCNC: 27.6 MMOL/L (ref 22–29)
CREAT SERPL-MCNC: 0.88 MG/DL (ref 0.6–1.1)
DEPRECATED RDW RBC AUTO: 37.8 FL (ref 37–54)
EOSINOPHIL # BLD AUTO: 0.37 10*3/MM3 (ref 0–0.4)
EOSINOPHIL NFR BLD AUTO: 4.8 % (ref 0.3–6.2)
ERYTHROCYTE [DISTWIDTH] IN BLOOD BY AUTOMATED COUNT: 11.7 % (ref 12.3–15.4)
GFR SERPL CREATININE-BSD FRML MDRD: 67 ML/MIN/1.73
GLOBULIN UR ELPH-MCNC: 2.7 GM/DL (ref 1.8–3.5)
GLUCOSE SERPL-MCNC: 108 MG/DL (ref 74–124)
HCT VFR BLD AUTO: 38.7 % (ref 34–46.6)
HGB BLD-MCNC: 12.9 G/DL (ref 12–15.9)
IMM GRANULOCYTES # BLD AUTO: 0.05 10*3/MM3 (ref 0–0.05)
IMM GRANULOCYTES NFR BLD AUTO: 0.6 % (ref 0–0.5)
LYMPHOCYTES # BLD AUTO: 2.02 10*3/MM3 (ref 0.7–3.1)
LYMPHOCYTES NFR BLD AUTO: 26.2 % (ref 19.6–45.3)
MCH RBC QN AUTO: 29.1 PG (ref 26.6–33)
MCHC RBC AUTO-ENTMCNC: 33.3 G/DL (ref 31.5–35.7)
MCV RBC AUTO: 87.4 FL (ref 79–97)
MONOCYTES # BLD AUTO: 0.49 10*3/MM3 (ref 0.1–0.9)
MONOCYTES NFR BLD AUTO: 6.4 % (ref 5–12)
NEUTROPHILS NFR BLD AUTO: 4.7 10*3/MM3 (ref 1.7–7)
NEUTROPHILS NFR BLD AUTO: 61 % (ref 42.7–76)
NRBC BLD AUTO-RTO: 0 /100 WBC (ref 0–0.2)
PLATELET # BLD AUTO: 350 10*3/MM3 (ref 140–450)
PMV BLD AUTO: 8.9 FL (ref 6–12)
POTASSIUM SERPL-SCNC: 3.4 MMOL/L (ref 3.5–4.7)
PROT SERPL-MCNC: 7.1 G/DL (ref 6.3–8)
RBC # BLD AUTO: 4.43 10*6/MM3 (ref 3.77–5.28)
SODIUM SERPL-SCNC: 142 MMOL/L (ref 134–145)
WBC # BLD AUTO: 7.71 10*3/MM3 (ref 3.4–10.8)

## 2021-05-07 PROCEDURE — 97530 THERAPEUTIC ACTIVITIES: CPT | Performed by: PHYSICAL THERAPIST

## 2021-05-07 PROCEDURE — 85025 COMPLETE CBC W/AUTO DIFF WBC: CPT

## 2021-05-07 PROCEDURE — 80053 COMPREHEN METABOLIC PANEL: CPT

## 2021-05-07 PROCEDURE — 97110 THERAPEUTIC EXERCISES: CPT | Performed by: PHYSICAL THERAPIST

## 2021-05-07 PROCEDURE — 99214 OFFICE O/P EST MOD 30 MIN: CPT | Performed by: INTERNAL MEDICINE

## 2021-05-07 PROCEDURE — 36415 COLL VENOUS BLD VENIPUNCTURE: CPT

## 2021-05-07 RX ORDER — TAMOXIFEN CITRATE 20 MG/1
20 TABLET ORAL DAILY
Qty: 90 TABLET | Refills: 2 | Status: SHIPPED | OUTPATIENT
Start: 2021-05-07 | End: 2022-06-21 | Stop reason: SDUPTHER

## 2021-05-08 PROBLEM — I15.9 SECONDARY HYPERTENSION: Status: ACTIVE | Noted: 2021-05-08

## 2021-05-10 ENCOUNTER — TELEPHONE (OUTPATIENT)
Dept: SURGERY | Facility: CLINIC | Age: 56
End: 2021-05-10

## 2021-05-10 ENCOUNTER — TELEPHONE (OUTPATIENT)
Dept: ONCOLOGY | Facility: CLINIC | Age: 56
End: 2021-05-10

## 2021-05-10 DIAGNOSIS — D05.12 DUCTAL CARCINOMA IN SITU (DCIS) OF LEFT BREAST: Primary | ICD-10-CM

## 2021-05-10 DIAGNOSIS — Z12.39 SCREENING BREAST EXAMINATION: ICD-10-CM

## 2021-05-10 DIAGNOSIS — Z80.49 FAMILY HISTORY OF CERVICAL CANCER: ICD-10-CM

## 2021-05-10 DIAGNOSIS — Z90.12 STATUS POST MASTECTOMY, LEFT: ICD-10-CM

## 2021-05-10 NOTE — TELEPHONE ENCOUNTER
Received call from Ms. Parada, as I had left message for her this morning.  She states that she is waiting on Dr. Cat's office to set up mammogram.  Explained to her that we had ordered right screening mammogram and she states that she would like to have it done at [facility] and not [facility]. Let scheduling know and mammogram is being set up and scheduling to call the patient with appointment time.

## 2021-05-10 NOTE — TELEPHONE ENCOUNTER
Patient called to reschedule appointment that she missed in 12/2020.    Dr. Bailey ordered mammogram and it is scheduled on 8/6/2021 @ 3:45pm.    F/u appointment with Yelena Claros NP is scheduled on 8/11/2021 @ 11:30am.    Called and left message with patient about appointment time, patient to call back and confirm.    Sent patient a reminder letter in the mail.

## 2021-05-14 ENCOUNTER — TREATMENT (OUTPATIENT)
Dept: PHYSICAL THERAPY | Facility: CLINIC | Age: 56
End: 2021-05-14

## 2021-05-14 DIAGNOSIS — S46.911D STRAIN OF RIGHT SHOULDER, SUBSEQUENT ENCOUNTER: ICD-10-CM

## 2021-05-14 DIAGNOSIS — S16.1XXD STRAIN OF NECK MUSCLE, SUBSEQUENT ENCOUNTER: ICD-10-CM

## 2021-05-14 DIAGNOSIS — S46.011D TRAUMATIC TEAR OF RIGHT ROTATOR CUFF, UNSPECIFIED TEAR EXTENT, SUBSEQUENT ENCOUNTER: Primary | ICD-10-CM

## 2021-05-14 PROCEDURE — 97140 MANUAL THERAPY 1/> REGIONS: CPT | Performed by: PHYSICAL THERAPIST

## 2021-05-14 PROCEDURE — 97110 THERAPEUTIC EXERCISES: CPT | Performed by: PHYSICAL THERAPIST

## 2021-05-14 PROCEDURE — 97530 THERAPEUTIC ACTIVITIES: CPT | Performed by: PHYSICAL THERAPIST

## 2021-05-14 NOTE — PROGRESS NOTES
Physical Therapy Daily Progress Note  Visit: 3    Mason Parada reports: My (R) shdr was hurting with the ER exercise.  I stopped doing that exercise.  My neck is feeling better.      Subjective     Objective   See Exercise, Manual, and Modality Logs for complete treatment.       Assessment & Plan     Assessment  Assessment details: Stopped the the ER JOSEFINA exercise.  Started MT today with good tolerance to the neck.      Plan  Plan details: Progress ROM / strengthening / stabilization / functional activity as tolerated          Manual Therapy:     10     mins  80507;  Therapeutic Exercise:     25     mins  88880;     Neuromuscular Alesia:         mins  38836;    Therapeutic Activity:      10     mins  47652;     Gait Training:            mins  30839;     Ultrasound:           mins  96376;    Electrical Stimulation:          mins  27054 ( );  Dry Needling           mins self-pay  Traction           mins 99554  Canalith Repositioning         mins 37964      Timed Treatment:   45   mins   Total Treatment:     45   mins    [facility]  [place] License #: [id number]    Physical Therapist

## 2021-05-17 ENCOUNTER — APPOINTMENT (OUTPATIENT)
Dept: MRI IMAGING | Facility: HOSPITAL | Age: 56
End: 2021-05-17

## 2021-05-24 ENCOUNTER — APPOINTMENT (OUTPATIENT)
Dept: MAMMOGRAPHY | Facility: HOSPITAL | Age: 56
End: 2021-05-24

## 2021-05-28 ENCOUNTER — TREATMENT (OUTPATIENT)
Dept: PHYSICAL THERAPY | Facility: CLINIC | Age: 56
End: 2021-05-28

## 2021-05-28 ENCOUNTER — HOSPITAL ENCOUNTER (OUTPATIENT)
Dept: MRI IMAGING | Facility: HOSPITAL | Age: 56
Discharge: HOME | End: 2021-05-28
Admitting: ORTHOPAEDIC SURGERY

## 2021-05-28 DIAGNOSIS — S16.1XXD STRAIN OF NECK MUSCLE, SUBSEQUENT ENCOUNTER: ICD-10-CM

## 2021-05-28 DIAGNOSIS — S46.011A TRAUMATIC TEAR OF RIGHT ROTATOR CUFF, UNSPECIFIED TEAR EXTENT, INITIAL ENCOUNTER: ICD-10-CM

## 2021-05-28 DIAGNOSIS — S46.911D STRAIN OF RIGHT SHOULDER, SUBSEQUENT ENCOUNTER: ICD-10-CM

## 2021-05-28 DIAGNOSIS — S46.011D TRAUMATIC TEAR OF RIGHT ROTATOR CUFF, UNSPECIFIED TEAR EXTENT, SUBSEQUENT ENCOUNTER: Primary | ICD-10-CM

## 2021-05-28 PROCEDURE — 97110 THERAPEUTIC EXERCISES: CPT | Performed by: PHYSICAL THERAPIST

## 2021-05-28 PROCEDURE — 97530 THERAPEUTIC ACTIVITIES: CPT | Performed by: PHYSICAL THERAPIST

## 2021-05-28 PROCEDURE — 73221 MRI JOINT UPR EXTREM W/O DYE: CPT
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
## 2021-06-03 ENCOUNTER — TELEPHONE (OUTPATIENT)
Dept: ORTHOPEDIC SURGERY | Facility: CLINIC | Age: 56
End: 2021-06-03

## 2021-06-03 NOTE — TELEPHONE ENCOUNTER
Caller: ELIZABETH FERNANDES    Relationship to patient: SELF    Best call back number: [phone]    Patient is needing: PATIENT IS AT WORK AND MISSED CALL FROM SOMEONE TO DISCUSS MRI RESULTS.  COULD NOT WT, PATIENT IS OFF WORK ON Friday 06/04/21 AND WILL BE ABLE TO ANSWER PHONE ANYTIME.  PLEASE CALL BACK TO DISCUSS MRI RESULTS.  THANK YOU

## 2021-06-03 NOTE — TELEPHONE ENCOUNTER
Nitza, I am out of office tomorrow.    Do you mind calling her back with following results:    Please tell her she has a very large rotator cuff tear that is old it is not repairable her options would be intermittent injections the surgical procedure that she would be a candidate for is a superior capsular repair she would like to consider that her discussed that I would have her see Dr. Ventura

## 2021-06-25 ENCOUNTER — OFFICE VISIT (OUTPATIENT)
Dept: ORTHOPEDIC SURGERY | Facility: CLINIC | Age: 56
End: 2021-06-25

## 2021-06-25 VITALS — HEIGHT: 59 IN | TEMPERATURE: 96.8 F | WEIGHT: 149 LBS | BODY MASS INDEX: 30.04 KG/M2

## 2021-06-25 DIAGNOSIS — S46.011A TRAUMATIC TEAR OF RIGHT ROTATOR CUFF, UNSPECIFIED TEAR EXTENT, INITIAL ENCOUNTER: Primary | ICD-10-CM

## 2021-06-25 PROBLEM — M75.100 ROTATOR CUFF TEAR: Status: ACTIVE | Noted: 2021-06-25

## 2021-06-25 PROCEDURE — 99214 OFFICE O/P EST MOD 30 MIN: CPT | Performed by: ORTHOPAEDIC SURGERY

## 2021-06-25 RX ORDER — VANCOMYCIN HYDROCHLORIDE 1 G/200ML
15 INJECTION, SOLUTION INTRAVENOUS ONCE
Status: CANCELLED | OUTPATIENT
Start: 2021-10-14 | End: 2021-06-25

## 2021-06-25 RX ORDER — ACETAMINOPHEN 325 MG/1
1000 TABLET ORAL ONCE
Status: CANCELLED | OUTPATIENT
Start: 2021-10-14 | End: 2021-06-25

## 2021-06-25 RX ORDER — CEFAZOLIN SODIUM 2 G/100ML
2 INJECTION, SOLUTION INTRAVENOUS ONCE
Status: CANCELLED | OUTPATIENT
Start: 2021-10-14 | End: 2021-06-25

## 2021-06-25 RX ORDER — CHLORHEXIDINE GLUCONATE 500 MG/1
1 CLOTH TOPICAL TAKE AS DIRECTED
Status: CANCELLED | OUTPATIENT
Start: 2021-06-25

## 2021-06-25 RX ORDER — MELOXICAM 15 MG/1
15 TABLET ORAL ONCE
Status: CANCELLED | OUTPATIENT
Start: 2021-10-14 | End: 2021-06-25

## 2021-06-25 RX ORDER — PREGABALIN 75 MG/1
150 CAPSULE ORAL ONCE
Status: CANCELLED | OUTPATIENT
Start: 2021-10-14 | End: 2021-06-25

## 2021-06-25 NOTE — PROGRESS NOTES
Patient: Mason Parada    YOB: 1965    Medical Record Number: [id number]    Chief Complaints:  Referral for right shoulder pain    History of Present Illness:     55 y.o. female patient who presents for evaluation of the right shoulder.  The patient is referred to me for further evaluation by Dr. Conde.  The patient reports worsening right shoulder pain and dysfunction over the years.  She was treated a number of years ago by a physician outside of our group.  In January, she was lifting a heavy nursing bag when she felt a sharp pain in her shoulder.  She reports that her symptoms got worse at that time.  Her current pain is described as moderate to severe, constant and aching.  She reports difficulty with overhead activities, reaching and lifting.  She has tried activity modifications, anti-inflammatories and therapy.  She has seen little improvement.  She was referred to me to discuss surgical options.    Allergies:   Allergies   Allergen Reactions   • Hydrocodone Itching   • Sulfa Antibiotics Itching     swelling       Home Medications:    Current Outpatient Medications:   •  Acetaminophen (TYLENOL ARTHRITIS PAIN PO), Take 500 mg by mouth 2 (Two) Times a Day., Disp: , Rfl:   •  Cholecalciferol (VITAMIN D) 1000 units tablet, Take 1,000 Units by mouth Daily. HOLDING FOR SURGERY, Disp: , Rfl:   •  hydrOXYzine (ATARAX) 25 MG tablet, Take 1 tablet by mouth Every 8 (Eight) Hours As Needed for Itching., Disp: 60 tablet, Rfl: 10  •  ibuprofen (ADVIL,MOTRIN) 800 MG tablet, Take 1 tablet by mouth Every 8 (Eight) Hours As Needed for Mild Pain ., Disp: 90 tablet, Rfl: 1  •  ketotifen (ZADITOR) 0.025 % ophthalmic solution, Administer 1 drop to both eyes 2 (Two) Times a Day., Disp: 1 each, Rfl: 5  •  lisinopril-hydrochlorothiazide (PRINZIDE,ZESTORETIC) 20-25 MG per tablet, Take 1 tablet by mouth Daily. PLEASE MAKE APPOINTMENT, Disp: 30 tablet, Rfl: 5  •  tamoxifen (NOLVADEX) 20 MG chemo tablet, Take 1 tablet  by mouth Daily., Disp: 90 tablet, Rfl: 2  •  TiZANidine (Zanaflex) 4 MG capsule, Take 1 capsule by mouth 3 (Three) Times a Day. (Patient taking differently: Take 4 mg by mouth 3 (Three) Times a Day As Needed.), Disp: 90 capsule, Rfl: 1    Past Medical History:   Diagnosis Date   • Allergic    • Anxiety    • Carpal tunnel syndrome of right wrist    • Chest pain radiating to jaw     STRESS TEST OK 2018, IN EPIC - D/T STRESS, PANIC    • GERD (gastroesophageal reflux disease)    • H/O Septicemia (CMS/HCC)     H/O Kidney stones   • Headache    • Heart palpitations    • History of kidney stones    • Hyperlipidemia    • Hypertension    • Injury of neck     Whiplash 25 years ago        Past Surgical History:   Procedure Laterality Date   • BREAST BIOPSY Left    • BREAST RECONSTRUCTION Left 3/5/2019    Procedure: left prepectorasl placement of tissue expander with alloderm;  Surgeon: Abdirashid Ding MD;  Location: [facility];  Service: Plastics   • BREAST TISSUE EXPANDER REMOVAL INSERTION OF IMPLANT Bilateral 2019    Procedure: LEFT REMOVAL OF TISSUE EXPANDER AND PLACEMENT OF IMPLANT RIGHT AUGMENTATION RIGHT MASTOPEXY FOR SYMMETRY;  Surgeon: Abdirashid Ding MD;  Location: [facility] OR;  Service: Plastics   •  SECTION  1994    x1   • EXTRACORPOREAL SHOCK WAVE LITHOTRIPSY (ESWL)  2017   • FAT GRAFTING Left 2019    Procedure: FAT GRAFTING;  Surgeon: Abdirashid Ding MD;  Location: [facility];  Service: Plastics   • KIDNEY STONE SURGERY     • MASTECTOMY W/ SENTINEL NODE BIOPSY Left 3/5/2019    Procedure: LEFT SKIN-SPARING MASTECTOMY WITH SENTINEL LYMPH NODE BIOPSY;  Surgeon: Vonnie Cat MD;  Location: [facility] OR;  Service: General       Social History     Occupational History   • Occupation: LPN     Employer: [employer]     Employer: [employer]   Tobacco Use   • Smoking status: Never Smoker   • Smokeless tobacco: Never Used   Vaping Use  "  • Vaping Use: Never used   Substance and Sexual Activity   • Alcohol use: Yes     Alcohol/week: 3.0 standard drinks     Types: 3 Glasses of wine per week   • Drug use: No   • Sexual activity: Defer      Social History     Social History Narrative   • Not on file       Family History   Problem Relation Age of Onset   • Hypertension Mother    • Other Mother         Blood clots   • Emphysema Father    • Lung cancer Father 62   • Bone cancer Father 62   • Lupus Sister    • Cancer Sister 28        Cervical   • Other Brother         kidney stone   • Heart disease Maternal Grandfather 55   • Diabetes Maternal Grandfather    • Hypertension Maternal Grandfather    • Pancreatic cancer Paternal Grandmother 68   • Other Paternal Grandfather         kidney stone   • Stroke Maternal Grandmother    • Breast cancer Paternal Aunt         late 60's   • Ovarian cancer Sister 34   • Cancer Other    • Malig Hyperthermia Neg Hx        Review of Systems:      Constitutional: Denies fever, shaking or chills   Eyes: Denies change in visual acuity   HEENT: Denies nasal congestion or sore throat   Respiratory: Denies cough or shortness of breath   Cardiovascular: Denies chest pain or edema  Endocrine: Denies tremors, palpitations, intolerance of heat or cold, polyuria, polydipsia.  GI: Denies abdominal pain, nausea, vomiting, bloody stools or diarrhea  : Denies frequency, urgency, incontinence, retention, or nocturia.  Musculoskeletal: Denies numbness, tingling or loss of motor function except as above  Integument: Denies rash, lesion or ulceration   Neurologic: Denies headache or focal weakness, deficits  Heme: Denies spontaneous or excessive bleeding, epistaxis, hematuria, melena, fatigue, enlarged or tender lymph nodes.      All other pertinent positives and negatives as noted above in HPI.    Physical Exam:   55 y.o. female    Vitals:    06/25/21 1124   Temp: 96.8 °F (36 °C)   Weight: 67.6 kg (149 lb)   Height: 149.9 cm (59\") "     General:  Patient is awake and alert.  Appears in no acute distress or discomfort.    Psych:  Affect and demeanor are appropriate.    Eyes:  Conjunctiva and sclera appear grossly normal.  Eyes track well and EOM seem to be intact.    Ears:  No gross abnormalities.  Hearing adequate for the exam.    Cardiovascular:  Regular rate and rhythm.    Lungs:  Good chest expansion.  Breathing unlabored.    Extremities: Right shoulder is examined. Skin is benign.  No palpable masses or adenopathy.  Moderate tenderness noted over anterior glenohumeral joint and rotator interval.  Motion is full.  No instability.  4 out of 5 strength with elevation in the scapular plane and external rotation.  Negative external rotation lag sign.  Good motor and sensory function in lower arm and hand.  Brisk capillary refill in hand.  Palpable radial pulse.  Good skin turgor.    Imaging:  Previous x-rays including AP, scapular Y and axillary views of the right shoulder are reviewed.  She has an acromiohumeral interval measuring 5.5 mm.  No other significant findings.  MRI of the right shoulder is reviewed as well.  Findings are listed below.    IMPRESSION:  Massive, chronic appearing, full-thickness right rotator  cuff tear involving the supraspinatus and infraspinatus tendons. There  is tendon retraction and muscle atrophy.    Assessment/Plan:  Right shoulder chronic, irreparable rotator cuff tear     We had a long discussion about conservative and surgical treatment options.  We talked about surgical options including an attempted repair versus capsular reconstruction versus a reverse arthroplasty.  All 3 options were thoroughly discussed including the details of the surgery itself, rehabilitation and recovery.  All risks, benefits and alternatives were thoroughly discussed.  The patient acknowledged understanding this information.  She has stated that she does not want to go through the capsule reconstruction and would prefer the  arthroplasty.  I will have my  contact her about setting this up.  I will have her return to the office for a preoperative visit with either myself or Arelis.    Hari Ventura MD    06/25/2021

## 2021-06-28 PROBLEM — S46.011A TRAUMATIC TEAR OF RIGHT ROTATOR CUFF: Status: ACTIVE | Noted: 2021-06-28

## 2021-07-06 ENCOUNTER — TELEPHONE (OUTPATIENT)
Dept: ORTHOPEDIC SURGERY | Facility: CLINIC | Age: 56
End: 2021-07-06

## 2021-07-06 RX ORDER — TRAMADOL HYDROCHLORIDE 50 MG/1
50 TABLET ORAL EVERY 4 HOURS PRN
Qty: 50 TABLET | Refills: 0 | Status: SHIPPED | OUTPATIENT
Start: 2021-07-06 | End: 2021-07-30 | Stop reason: SDUPTHER

## 2021-07-06 NOTE — TELEPHONE ENCOUNTER
"HUB ATTEMPTED CLINICAL WARM TRANSFER - NO ANSWER     Caller: YOSEF MOHAMUD - [facility] - [place], [place]     Best call back number: [phone]    Medication needed:   Requested Prescriptions     Signed Prescriptions Disp Refills   • traMADol (ULTRAM) 50 MG tablet 50 tablet 0     Sig: Take 1 tablet by mouth Every 4 (Four) Hours As Needed for Moderate Pain . Take 1 tablet by mouth every 8 hours as needed for moderate pain.     Authorizing Provider: LIZ BRICEÑO       When do you need the refill by: ASAP / NEW SCRIPT     What additional details did the patient provide when requesting the medication: QUESTION ABOUT DIRECTIONS RECEIVED - STATES BOTH \"ONE TABLET EVERY 4 HOURS\" & ALSO STATES \"ONE TABLET EVERY 8 HOURS\" -     ASKING WHAT DIRECTIONS SHOULD BE? (PER PREVIOUS DR. BRICEÑO MESSAGE, LOOKS LIKE SHOULD BE EVERY 8 HOURS)     Does the patient have less than a 3 day supply:  [x] Yes - NEW SCRIPT     What is the patient's preferred pharmacy:      [facility] - [place], [place]   Best call back number: [phone]    [phone]     THANKS    "

## 2021-07-06 NOTE — TELEPHONE ENCOUNTER
Hub staff attempted to follow warm transfer process and was unsuccessful     Caller: ELIZABETH FERNANDES    Relationship to patient: SELF    Best call back number: [phone]  Patient is needing: PT STATES SHE FEELS LIKE SHE'S HURT HER RIGHT SHOULDER, SHE'S DUE FOR SX IN October, BUT WANTS TO KNOW IF THERE'S ANYTHING SHE CAN TAKE OTHER THAN 800MG MOTRIN FOR THE PAIN.

## 2021-07-06 NOTE — TELEPHONE ENCOUNTER
We can try tramadol if she would like, okay to call in tramadol 50 mg 1 p.o. every 8 hours as needed dispense #50.

## 2021-07-22 DIAGNOSIS — H10.13 ALLERGIC CONJUNCTIVITIS OF BOTH EYES: ICD-10-CM

## 2021-07-22 RX ORDER — KETOTIFEN FUMARATE 0.35 MG/ML
SOLUTION/ DROPS OPHTHALMIC
Qty: 5 ML | Refills: 0 | OUTPATIENT
Start: 2021-07-22

## 2021-07-30 ENCOUNTER — OFFICE VISIT (OUTPATIENT)
Dept: FAMILY MEDICINE CLINIC | Facility: CLINIC | Age: 56
End: 2021-07-30

## 2021-07-30 ENCOUNTER — TELEPHONE (OUTPATIENT)
Dept: FAMILY MEDICINE CLINIC | Facility: CLINIC | Age: 56
End: 2021-07-30

## 2021-07-30 VITALS
SYSTOLIC BLOOD PRESSURE: 123 MMHG | HEART RATE: 75 BPM | DIASTOLIC BLOOD PRESSURE: 81 MMHG | WEIGHT: 146 LBS | BODY MASS INDEX: 29.43 KG/M2 | OXYGEN SATURATION: 98 % | HEIGHT: 59 IN | TEMPERATURE: 97.7 F | RESPIRATION RATE: 16 BRPM

## 2021-07-30 DIAGNOSIS — I10 ESSENTIAL HYPERTENSION: ICD-10-CM

## 2021-07-30 DIAGNOSIS — F41.9 ANXIETY: ICD-10-CM

## 2021-07-30 DIAGNOSIS — E55.9 VITAMIN D DEFICIENCY: Primary | ICD-10-CM

## 2021-07-30 DIAGNOSIS — M75.121 NONTRAUMATIC COMPLETE TEAR OF RIGHT ROTATOR CUFF: ICD-10-CM

## 2021-07-30 DIAGNOSIS — H10.13 ALLERGIC CONJUNCTIVITIS OF BOTH EYES: ICD-10-CM

## 2021-07-30 PROCEDURE — 99214 OFFICE O/P EST MOD 30 MIN: CPT | Performed by: NURSE PRACTITIONER

## 2021-07-30 RX ORDER — KETOTIFEN FUMARATE 0.35 MG/ML
1 SOLUTION/ DROPS OPHTHALMIC 2 TIMES DAILY
Qty: 1 EACH | Refills: 5 | Status: SHIPPED | OUTPATIENT
Start: 2021-07-30 | End: 2022-05-17

## 2021-07-30 RX ORDER — IBUPROFEN 800 MG/1
800 TABLET ORAL EVERY 8 HOURS PRN
Qty: 90 TABLET | Refills: 1 | Status: SHIPPED | OUTPATIENT
Start: 2021-07-30 | End: 2021-10-28 | Stop reason: SDUPTHER

## 2021-07-30 RX ORDER — LISINOPRIL AND HYDROCHLOROTHIAZIDE 25; 20 MG/1; MG/1
1 TABLET ORAL DAILY
Qty: 90 TABLET | Refills: 3 | Status: SHIPPED | OUTPATIENT
Start: 2021-07-30 | End: 2022-05-31 | Stop reason: SDUPTHER

## 2021-07-30 RX ORDER — TRAMADOL HYDROCHLORIDE 50 MG/1
50 TABLET ORAL EVERY 4 HOURS PRN
Qty: 50 TABLET | Refills: 0 | Status: SHIPPED | OUTPATIENT
Start: 2021-07-30 | End: 2021-09-17 | Stop reason: SDUPTHER

## 2021-07-30 NOTE — PROGRESS NOTES
"Subjective   Mason Parada is a 56 y.o. female.     History of Present Illness    Since the last visit, she has overall felt well.  She has Essential Hypertension and well controlled on current medication.  she has been compliant with current medications have reviewed them.  The patient denies medication side effects.  Will refill medications. /81   Pulse 75   Temp 97.7 °F (36.5 °C)   Resp 16   Ht 149.9 cm (59\")   Wt 66.2 kg (146 lb)   SpO2 98%   BMI 29.49 kg/m²     Results for orders placed or performed in visit on 05/07/21   Comprehensive Metabolic Panel    Specimen: Blood   Result Value Ref Range    Glucose 108 74 - 124 mg/dL    BUN 22 (H) 6 - 20 mg/dL    Creatinine 0.88 0.60 - 1.10 mg/dL    Sodium 142 134 - 145 mmol/L    Potassium 3.4 (L) 3.5 - 4.7 mmol/L    Chloride 103 98 - 107 mmol/L    CO2 27.6 22.0 - 29.0 mmol/L    Calcium 9.7 8.5 - 10.2 mg/dL    Total Protein 7.1 6.3 - 8.0 g/dL    Albumin 4.40 3.50 - 5.20 g/dL    ALT (SGPT) 21 0 - 33 U/L    AST (SGOT) 19 0 - 32 U/L    Alkaline Phosphatase 82 38 - 116 U/L    Total Bilirubin 0.3 0.2 - 1.2 mg/dL    eGFR Non African Amer 67 >60 mL/min/1.73    Globulin 2.7 1.8 - 3.5 gm/dL    A/G Ratio 1.6 1.1 - 2.4 g/dL    BUN/Creatinine Ratio 25.0 7.3 - 30.0    Anion Gap 11.4 5.0 - 15.0 mmol/L   CBC Auto Differential    Specimen: Blood   Result Value Ref Range    WBC 7.71 3.40 - 10.80 10*3/mm3    RBC 4.43 3.77 - 5.28 10*6/mm3    Hemoglobin 12.9 12.0 - 15.9 g/dL    Hematocrit 38.7 34.0 - 46.6 %    MCV 87.4 79.0 - 97.0 fL    MCH 29.1 26.6 - 33.0 pg    MCHC 33.3 31.5 - 35.7 g/dL    RDW 11.7 (L) 12.3 - 15.4 %    RDW-SD 37.8 37.0 - 54.0 fl    MPV 8.9 6.0 - 12.0 fL    Platelets 350 140 - 450 10*3/mm3    Neutrophil % 61.0 42.7 - 76.0 %    Lymphocyte % 26.2 19.6 - 45.3 %    Monocyte % 6.4 5.0 - 12.0 %    Eosinophil % 4.8 0.3 - 6.2 %    Basophil % 1.0 0.0 - 1.5 %    Immature Grans % 0.6 (H) 0.0 - 0.5 %    Neutrophils, Absolute 4.70 1.70 - 7.00 10*3/mm3    Lymphocytes, " Absolute 2.02 0.70 - 3.10 10*3/mm3    Monocytes, Absolute 0.49 0.10 - 0.90 10*3/mm3    Eosinophils, Absolute 0.37 0.00 - 0.40 10*3/mm3    Basophils, Absolute 0.08 0.00 - 0.20 10*3/mm3    Immature Grans, Absolute 0.05 0.00 - 0.05 10*3/mm3    nRBC 0.0 0.0 - 0.2 /100 WBC         The following portions of the patient's history were reviewed and updated as appropriate: allergies, current medications, past family history, past medical history, past social history, past surgical history and problem list.    Review of Systems   Constitutional: Negative for unexpected weight change.   Respiratory: Negative for shortness of breath.    Cardiovascular: Negative for chest pain and palpitations.   Musculoskeletal: Positive for arthralgias and joint swelling.   Neurological: Positive for weakness. Negative for numbness.   Psychiatric/Behavioral: Negative for behavioral problems.       Objective   Physical Exam  Vitals and nursing note reviewed.   Constitutional:       Appearance: Normal appearance. She is well-developed.   Neck:      Vascular: No carotid bruit.   Cardiovascular:      Rate and Rhythm: Normal rate and regular rhythm.   Pulmonary:      Effort: Pulmonary effort is normal.      Breath sounds: Normal breath sounds.   Musculoskeletal:      Right shoulder: Tenderness present. Decreased range of motion. Decreased strength.   Neurological:      Mental Status: She is alert and oriented to person, place, and time.   Psychiatric:         Mood and Affect: Mood normal.         Behavior: Behavior normal.         Thought Content: Thought content normal.         Judgment: Judgment normal.         Assessment/Plan   Diagnoses and all orders for this visit:    1. Vitamin D deficiency (Primary)  -     Vitamin D 25 Hydroxy    2. Essential hypertension  -     lisinopril-hydrochlorothiazide (PRINZIDE,ZESTORETIC) 20-25 MG per tablet; Take 1 tablet by mouth Daily.  Dispense: 90 tablet; Refill: 3  -     Comprehensive metabolic panel  -      Lipid panel  -     CBC and Differential  -     TSH  -     Vitamin D 25 Hydroxy    3. Anxiety    4. Allergic conjunctivitis of both eyes  -     ketotifen (ZADITOR) 0.025 % ophthalmic solution; Administer 1 drop to both eyes 2 (Two) Times a Day.  Dispense: 1 each; Refill: 5    5. Nontraumatic complete tear of right rotator cuff  -     traMADol (ULTRAM) 50 MG tablet; Take 1 tablet by mouth Every 4 (Four) Hours As Needed for Moderate Pain . Take 1 tablet by mouth every 8 hours as needed for moderate pain.  Dispense: 50 tablet; Refill: 0  -     ibuprofen (ADVIL,MOTRIN) 800 MG tablet; Take 1 tablet by mouth Every 8 (Eight) Hours As Needed for Mild Pain .  Dispense: 90 tablet; Refill: 1          She is out of tramadol per Dr. Ventura.  She had to reschedule her surgery until October due to insurance issues.  She has full thickness rotator cuff tear with muscle atrophy.  She uses tramadol and Motrin as needed.  IRMA reviewed.  Temp supply given.

## 2021-07-30 NOTE — TELEPHONE ENCOUNTER
Pharmacy Name: [facility] [place], [place] - [address] - [phone]  - [phone]      Pharmacy representative name: YOSEF    Pharmacy representative phone number: [phone]    What medication are you calling in regards to: TRAMADOL 50MG    What question does the pharmacy have: PLEASE VERIFY WHICH OF THE INSTRUCTIONS LISTED BELOW IS CORRECT:      Sig: Take 1 tablet by mouth Every 4 (Four) Hours As Needed for Moderate Pain . Take 1 tablet by mouth every 8 hours as needed for moderate pain.               Who is the provider that prescribed the medication: WILLIAN JOSEPH    Additional notes: CALL PHARMACY BACK

## 2021-08-09 ENCOUNTER — TELEPHONE (OUTPATIENT)
Dept: SURGERY | Facility: CLINIC | Age: 56
End: 2021-08-09

## 2021-08-10 ENCOUNTER — TELEPHONE (OUTPATIENT)
Dept: SURGERY | Facility: CLINIC | Age: 56
End: 2021-08-10

## 2021-08-10 NOTE — TELEPHONE ENCOUNTER
DIANA for call back and sent a message through [employer] regarding rescheduling her missed mammogram appt and her appt with Yelena. I canceled her appt with Yelena.

## 2021-08-13 ENCOUNTER — TELEPHONE (OUTPATIENT)
Dept: SURGERY | Facility: CLINIC | Age: 56
End: 2021-08-13

## 2021-08-13 NOTE — TELEPHONE ENCOUNTER
Pt rescheduled her Mammogram at [facility] to  12-3-21 at 8:00      She will see Yelena Claros on,  12-15-21 at 9:00    Spoke to pt jeannette Asencio

## 2021-08-16 ENCOUNTER — TELEPHONE (OUTPATIENT)
Dept: ORTHOPEDIC SURGERY | Facility: CLINIC | Age: 56
End: 2021-08-16

## 2021-08-16 NOTE — TELEPHONE ENCOUNTER
Caller: ELIZABETH FERNANDES   Relationship to Patient: SELF     Phone Number: [phone]     Reason for Call: PATIENT CALLING STATING THAT SHE WAS PUTTING HER HAIR UP AND FURTHER INJURED HER SHOULDER, PATIENT STATES THAT THE PAIN IS SO BAD SHE HAS VOMITED AND THE PAIN MEDICATION IS NOT EASING IT AT ALL. SHE WOULD LIKE ADVISE ON WHAT TO DO AND IF SHE NEEDS TO COME IN

## 2021-08-18 ENCOUNTER — OFFICE VISIT (OUTPATIENT)
Dept: ORTHOPEDIC SURGERY | Facility: CLINIC | Age: 56
End: 2021-08-18

## 2021-08-18 VITALS — TEMPERATURE: 96.8 F | BODY MASS INDEX: 28.22 KG/M2 | HEIGHT: 59 IN | WEIGHT: 140 LBS

## 2021-08-18 DIAGNOSIS — S46.011D TRAUMATIC TEAR OF RIGHT ROTATOR CUFF, UNSPECIFIED TEAR EXTENT, SUBSEQUENT ENCOUNTER: Primary | ICD-10-CM

## 2021-08-18 DIAGNOSIS — M25.511 ACUTE PAIN OF RIGHT SHOULDER: ICD-10-CM

## 2021-08-18 PROCEDURE — 99213 OFFICE O/P EST LOW 20 MIN: CPT | Performed by: NURSE PRACTITIONER

## 2021-08-18 RX ORDER — PREDNISONE 20 MG/1
20 TABLET ORAL DAILY
Qty: 5 TABLET | Refills: 0 | Status: SHIPPED | OUTPATIENT
Start: 2021-08-18 | End: 2021-09-17

## 2021-08-18 NOTE — PROGRESS NOTES
"Chief Complaint: Follow-up right shoulder pain    HPI: Ms. Parada follows up today for her right shoulder.  Reports she had significant increased pain after fixing her hair on Monday.  She tells me her pain has improved slightly since the incident, but has persisted.  Pain is significantly worse with certain reaching motions.  She has tried ibuprofen 800 mg and rest.  She denies numbness or tingling in lower arm or hand.  She has an upcoming shoulder replacement surgery with Dr. Ventura in October.    Vitals:    08/18/21 0855   Temp: 96.8 °F (36 °C)   Weight: 63.5 kg (140 lb)   Height: 149.9 cm (59\")     Exam: Right shoulder is examined.  Skin is benign.  Focal tenderness over the anterior glenohumeral joint and rotator interval.  She has full motion with forward elevation and external rotation.  Internal rotation to back pocket.  No obvious instability.  4 out of 5 strength with resisted elevation in the scapular plane and with external rotation.  Negative external rotation lag sign.  Good motor and sensory function of the lower arm and hand.  Brisk capillary refill.  Palpable radial pulse.  Good skin turgor.    Imaging: None taken    Assessment: Acute on chronic right shoulder pain, irreparable rotator cuff tear    Plan:  We discussed a number of conservative treatments to help with her acute pain.  I explained that a steroid injection is not recommended as this will push her surgery out 3 more months.  I do recommend however she consider a short course of prednisone to help with her acute pain.  She agreed.  The risks and dosing instructions for this medication were discussed.  Patient understands she is to avoid all anti-inflammatories while taking this medication.  She may return to work tomorrow.  Going forward, she will proceed with surgery as previously planned.    Arelis Mujica, APRN     08/18/2021    "

## 2021-09-17 ENCOUNTER — OFFICE VISIT (OUTPATIENT)
Dept: FAMILY MEDICINE CLINIC | Facility: CLINIC | Age: 56
End: 2021-09-17

## 2021-09-17 VITALS
TEMPERATURE: 97.2 F | HEIGHT: 55 IN | SYSTOLIC BLOOD PRESSURE: 135 MMHG | RESPIRATION RATE: 16 BRPM | DIASTOLIC BLOOD PRESSURE: 74 MMHG | WEIGHT: 148 LBS | OXYGEN SATURATION: 99 % | BODY MASS INDEX: 34.25 KG/M2 | HEART RATE: 72 BPM

## 2021-09-17 DIAGNOSIS — Z12.83 SKIN EXAM FOR MALIGNANT NEOPLASM: ICD-10-CM

## 2021-09-17 DIAGNOSIS — M75.121 NONTRAUMATIC COMPLETE TEAR OF RIGHT ROTATOR CUFF: Primary | ICD-10-CM

## 2021-09-17 PROCEDURE — 99213 OFFICE O/P EST LOW 20 MIN: CPT | Performed by: NURSE PRACTITIONER

## 2021-09-17 RX ORDER — TRAMADOL HYDROCHLORIDE 50 MG/1
50 TABLET ORAL EVERY 4 HOURS PRN
Qty: 50 TABLET | Refills: 0 | Status: SHIPPED | OUTPATIENT
Start: 2021-09-17 | End: 2021-10-14 | Stop reason: HOSPADM

## 2021-09-17 NOTE — PROGRESS NOTES
Subjective   Mason Parada is a 56 y.o. female.     History of Present Illness   Patient presents to office to request refill on tramadol for ongoing shoulder pain.  She has reverse right total shoulder arthroplasty scheduled 10/14.  She has tried prednisone and tizanidine for pain control but states it has not helped.  She has been using tramadol sparingly from last fill and has used only twice a week.      She also wants referral to a particular dermatologist for general skin exam. Denies hx of skin cancer.         The following portions of the patient's history were reviewed and updated as appropriate: allergies, current medications, past family history, past medical history, past social history, past surgical history and problem list.    Review of Systems   Constitutional: Negative for unexpected weight change.   Respiratory: Negative for shortness of breath.    Cardiovascular: Negative for chest pain and palpitations.   Musculoskeletal: Positive for arthralgias and joint swelling.   Psychiatric/Behavioral: Negative for behavioral problems.       Objective   Physical Exam  Vitals and nursing note reviewed.   Constitutional:       Appearance: Normal appearance. She is well-developed.   Cardiovascular:      Rate and Rhythm: Normal rate.   Pulmonary:      Effort: Pulmonary effort is normal.   Neurological:      Mental Status: She is alert and oriented to person, place, and time.   Psychiatric:         Mood and Affect: Mood normal.         Behavior: Behavior normal.         Thought Content: Thought content normal.         Judgment: Judgment normal.         Assessment/Plan   Diagnoses and all orders for this visit:    1. Nontraumatic complete tear of right rotator cuff (Primary)  -     traMADol (ULTRAM) 50 MG tablet; Take 1 tablet by mouth Every 4 (Four) Hours As Needed for Moderate Pain . Take 1 tablet by mouth every 8 hours as needed for moderate pain.  Dispense: 50 tablet; Refill: 0    2. Skin exam for malignant  neoplasm  -     Ambulatory Referral to Dermatology    IRMA reviewed.

## 2021-09-29 ENCOUNTER — TRANSCRIBE ORDERS (OUTPATIENT)
Dept: PREADMISSION TESTING | Facility: HOSPITAL | Age: 56
End: 2021-09-29

## 2021-10-06 ENCOUNTER — PREP FOR SURGERY (OUTPATIENT)
Dept: OTHER | Facility: HOSPITAL | Age: 56
End: 2021-10-06

## 2021-10-06 ENCOUNTER — PRE-ADMISSION TESTING (OUTPATIENT)
Dept: PREADMISSION TESTING | Facility: HOSPITAL | Age: 56
End: 2021-10-06

## 2021-10-06 VITALS
WEIGHT: 148 LBS | RESPIRATION RATE: 16 BRPM | OXYGEN SATURATION: 98 % | BODY MASS INDEX: 29.84 KG/M2 | HEART RATE: 84 BPM | TEMPERATURE: 98.9 F | SYSTOLIC BLOOD PRESSURE: 136 MMHG | HEIGHT: 59 IN | DIASTOLIC BLOOD PRESSURE: 84 MMHG

## 2021-10-06 DIAGNOSIS — S46.011A TRAUMATIC TEAR OF RIGHT ROTATOR CUFF, INITIAL ENCOUNTER: Primary | ICD-10-CM

## 2021-10-06 DIAGNOSIS — S46.011A TRAUMATIC TEAR OF RIGHT ROTATOR CUFF, UNSPECIFIED TEAR EXTENT, INITIAL ENCOUNTER: ICD-10-CM

## 2021-10-06 LAB
ANION GAP SERPL CALCULATED.3IONS-SCNC: 13.4 MMOL/L (ref 5–15)
BACTERIA UR QL AUTO: ABNORMAL /HPF
BILIRUB UR QL STRIP: NEGATIVE
BUN SERPL-MCNC: 20 MG/DL (ref 6–20)
BUN/CREAT SERPL: 27.4 (ref 7–25)
CALCIUM SPEC-SCNC: 9.9 MG/DL (ref 8.6–10.5)
CHLORIDE SERPL-SCNC: 106 MMOL/L (ref 98–107)
CLARITY UR: CLEAR
CO2 SERPL-SCNC: 24.6 MMOL/L (ref 22–29)
COLOR UR: YELLOW
CREAT SERPL-MCNC: 0.73 MG/DL (ref 0.57–1)
DEPRECATED RDW RBC AUTO: 41.6 FL (ref 37–54)
ERYTHROCYTE [DISTWIDTH] IN BLOOD BY AUTOMATED COUNT: 13 % (ref 12.3–15.4)
GFR SERPL CREATININE-BSD FRML MDRD: 83 ML/MIN/1.73
GLUCOSE SERPL-MCNC: 104 MG/DL (ref 65–99)
GLUCOSE UR STRIP-MCNC: NEGATIVE MG/DL
HCT VFR BLD AUTO: 38.1 % (ref 34–46.6)
HGB BLD-MCNC: 12.9 G/DL (ref 12–15.9)
HGB UR QL STRIP.AUTO: NEGATIVE
HYALINE CASTS UR QL AUTO: ABNORMAL /LPF
KETONES UR QL STRIP: NEGATIVE
LEUKOCYTE ESTERASE UR QL STRIP.AUTO: ABNORMAL
MCH RBC QN AUTO: 29.7 PG (ref 26.6–33)
MCHC RBC AUTO-ENTMCNC: 33.9 G/DL (ref 31.5–35.7)
MCV RBC AUTO: 87.8 FL (ref 79–97)
NITRITE UR QL STRIP: NEGATIVE
PH UR STRIP.AUTO: 5.5 [PH] (ref 5–8)
PLATELET # BLD AUTO: 326 10*3/MM3 (ref 140–450)
PMV BLD AUTO: 9.2 FL (ref 6–12)
POTASSIUM SERPL-SCNC: 3.6 MMOL/L (ref 3.5–5.2)
PROT UR QL STRIP: NEGATIVE
QT INTERVAL: 414 MS
RBC # BLD AUTO: 4.34 10*6/MM3 (ref 3.77–5.28)
RBC # UR: ABNORMAL /HPF
REF LAB TEST METHOD: ABNORMAL
SODIUM SERPL-SCNC: 144 MMOL/L (ref 136–145)
SP GR UR STRIP: 1.02 (ref 1–1.03)
SQUAMOUS #/AREA URNS HPF: ABNORMAL /HPF
UROBILINOGEN UR QL STRIP: ABNORMAL
WBC # BLD AUTO: 9.17 10*3/MM3 (ref 3.4–10.8)
WBC UR QL AUTO: ABNORMAL /HPF

## 2021-10-06 PROCEDURE — 81001 URINALYSIS AUTO W/SCOPE: CPT

## 2021-10-06 PROCEDURE — 93005 ELECTROCARDIOGRAM TRACING: CPT

## 2021-10-06 PROCEDURE — 93010 ELECTROCARDIOGRAM REPORT: CPT | Performed by: INTERNAL MEDICINE

## 2021-10-06 PROCEDURE — 80048 BASIC METABOLIC PNL TOTAL CA: CPT

## 2021-10-06 PROCEDURE — 36415 COLL VENOUS BLD VENIPUNCTURE: CPT

## 2021-10-06 PROCEDURE — 85027 COMPLETE CBC AUTOMATED: CPT

## 2021-10-06 PROCEDURE — 87086 URINE CULTURE/COLONY COUNT: CPT

## 2021-10-06 RX ORDER — FAMOTIDINE 40 MG/1
40 TABLET, FILM COATED ORAL EVERY MORNING
COMMUNITY

## 2021-10-06 RX ORDER — CHLORHEXIDINE GLUCONATE 500 MG/1
1 CLOTH TOPICAL TAKE AS DIRECTED
Status: ACTIVE | OUTPATIENT
Start: 2021-10-06

## 2021-10-06 RX ORDER — CHLORHEXIDINE GLUCONATE 500 MG/1
CLOTH TOPICAL TAKE AS DIRECTED
Status: CANCELLED | OUTPATIENT
Start: 2021-10-06

## 2021-10-06 NOTE — DISCHARGE INSTRUCTIONS
Take the following medications the morning of surgery:  PEPCID    If you are on prescription narcotic pain medication to control your pain you may also take that medication the morning of surgery.        General Instructions:  • Do not eat solid food after midnight the night before surgery.  • You may drink clear liquids day of surgery but must stop at least one hour before your hospital arrival time.  • It is beneficial for you to have a clear drink that contains carbohydrates the day of surgery.  We suggest a 12 to 20 ounce bottle of Gatorade or Powerade for non-diabetic patients or a 12 to 20 ounce bottle of G2 or Powerade Zero for diabetic patients. (Pediatric patients, are not advised to drink a 12 to 20 ounce carbohydrate drink)    Clear liquids are liquids you can see through.  Nothing red in color.     Plain water                               Sports drinks  Sodas                                   Gelatin (Jell-O)  Fruit juices without pulp such as white grape juice and apple juice  Popsicles that contain no fruit or yogurt  Tea or coffee (no cream or milk added)  Gatorade / Powerade  G2 / Powerade Zero    • Infants may have breast milk up to four hours before surgery.  • Infants drinking formula may drink formula up to six hours before surgery.   • Patients who avoid smoking, chewing tobacco and alcohol for 4 weeks prior to surgery have a reduced risk of post-operative complications.  Quit smoking as many days before surgery as you can.  • Do not smoke, use chewing tobacco or drink alcohol the day of surgery.   • If applicable bring your C-PAP/ BI-PAP machine.  • Bring any papers given to you in the doctor’s office.  • Wear clean comfortable clothes.  • Do not wear contact lenses, false eyelashes or make-up.  Bring a case for your glasses.   • Bring crutches or walker if applicable.  • Remove all piercings.  Leave jewelry and any other valuables at home.  • Hair extensions with metal clips must be removed  prior to surgery.  • The Pre-Admission Testing nurse will instruct you to bring medications if unable to obtain an accurate list in Pre-Admission Testing.            Preventing a Surgical Site Infection:  • For 2 to 3 days before surgery, avoid shaving with a razor because the razor can irritate skin and make it easier to develop an infection.    • Any areas of open skin can increase the risk of a post-operative wound infection by allowing bacteria to enter and travel throughout the body.  Notify your surgeon if you have any skin wounds / rashes even if it is not near the expected surgical site.  The area will need assessed to determine if surgery should be delayed until it is healed.  • The night prior to surgery shower using a fresh bar of anti-bacterial soap (such as Dial) and clean washcloth.  Sleep in a clean bed with clean clothing.  Do not allow pets to sleep with you.  • Shower on the morning of surgery using a fresh bar of anti-bacterial soap (such as Dial) and clean washcloth.  Dry with a clean towel and dress in clean clothing.  • Ask your surgeon if you will be receiving antibiotics prior to surgery.  • Make sure you, your family, and all healthcare providers clean their hands with soap and water or an alcohol based hand  before caring for you or your wound.    Day of surgery:  Your arrival time is approximately two hours before your scheduled surgery time.  Upon arrival, a Pre-op nurse and Anesthesiologist will review your health history, obtain vital signs, and answer questions you may have.  The only belongings needed at this time will be a list of your home medications and if applicable your C-PAP/BI-PAP machine.  A Pre-op nurse will start an IV and you may receive medication in preparation for surgery, including something to help you relax.     Please be aware that surgery does come with discomfort.  We want to make every effort to control your discomfort so please discuss any uncontrolled  symptoms with your nurse.   Your doctor will most likely have prescribed pain medications.      If you are going home after surgery you will receive individualized written care instructions before being discharged.  A responsible adult must drive you to and from the hospital on the day of your surgery and stay with you for 24 hours.  Discharge prescriptions can be filled by the hospital pharmacy during regular pharmacy hours.  If you are having surgery late in the day/evening your prescription may be e-prescribed to your pharmacy.  Please verify your pharmacy hours or chose a 24 hour pharmacy to avoid not having access to your prescription because your pharmacy has closed for the day.    If you are staying overnight following surgery, you will be transported to your hospital room following the recovery period.  [facility] has all private rooms.    If you have any questions please call Pre-Admission Testing at [phone].  Deductibles and co-payments are collected on the day of service. Please be prepared to pay the required co-pay, deductible or deposit on the day of service as defined by your plan.    Patient Education for Self-Quarantine Process    • Following your COVID testing, we strongly recommend that you wear a mask when you are with other people and practice social distancing.   • Limit your activities to only required outings.  • Wash your hands with soap and water frequently for at least 20 seconds.   • Avoid touching your eyes, nose and mouth with unwashed hands.  • Do not share anything - utensils, drinking glasses, food from the same bowl.   • Sanitize household surfaces daily. Include all high touch areas (door handles, light switches, phones, countertops, etc.)    Call your surgeon immediately if you experience any of the following symptoms:  • Sore Throat  • Shortness of Breath or difficulty breathing  • Cough  • Chills  • Body soreness or muscle pain  • Headache  • Fever  • New  loss of taste or smell  • Do not arrive for your surgery ill.  Your procedure will need to be rescheduled to another time.  You will need to call your physician before the day of surgery to avoid any unnecessary exposure to hospital staff as well as other patients.      CHLORHEXIDINE CLOTH INSTRUCTIONS  The morning of surgery follow these instructions using the Chlorhexidine cloths you've been given.  These steps reduce bacteria on the body.  Do not use the cloths near your eyes, ears mouth, genitalia or on open wounds.  Throw the cloths away after use but do not try to flush them down a toilet.      • Open and remove one cloth at a time from the package.    • Leave the cloth unfolded and begin the bathing.  • Massage the skin with the cloths using gentle pressure to remove bacteria.  Do not scrub harshly.   • Follow the steps below with one 2% CHG cloth per area (6 total cloths).  • One cloth for neck, shoulders and chest.  • One cloth for both arms, hands, fingers and underarms (do underarms last).  • One cloth for the abdomen followed by groin.  • One cloth for right leg and foot including between the toes.  • One cloth for left leg and foot including between the toes.  • The last cloth is to be used for the back of the neck, back and buttocks.    Allow the CHG to air dry 3 minutes on the skin which will give it time to work and decrease the chance of irritation.  The skin may feel sticky until it is dry.  Do not rinse with water or any other liquid or you will lose the beneficial effects of the CHG.  If mild skin irritation occurs, do rinse the skin to remove the CHG.  Report this to the nurse at time of admission.  Do not apply lotions, creams, ointments, deodorants or perfumes after using the clothes. Dress in clean clothes before coming to the hospital.    BACTROBAN NASAL OINTMENT  There are many germs normally in your nose. Bactroban is an ointment that will help reduce these germs. Please follow these  instructions for Bactroban use:      _1ST___The day before surgery in the morning  Date__10/13______    _2ND___The day before surgery in the evening              Date___10/13_____    _3RD___The day of surgery in the morning    Date____10/14____    **Squirt ½ package of Bactroban Ointment onto a cotton applicator and apply to inside of 1st nostril.  Squirt the remaining Bactroban and apply to the inside of the other nostril.

## 2021-10-07 LAB — BACTERIA SPEC AEROBE CULT: NO GROWTH

## 2021-10-08 ENCOUNTER — OFFICE VISIT (OUTPATIENT)
Dept: ORTHOPEDIC SURGERY | Facility: CLINIC | Age: 56
End: 2021-10-08

## 2021-10-08 VITALS — WEIGHT: 148 LBS | TEMPERATURE: 97.8 F | HEIGHT: 59 IN | BODY MASS INDEX: 29.84 KG/M2

## 2021-10-08 DIAGNOSIS — Z01.818 PREOP EXAMINATION: Primary | ICD-10-CM

## 2021-10-08 PROCEDURE — S0260 H&P FOR SURGERY: HCPCS | Performed by: NURSE PRACTITIONER

## 2021-10-08 NOTE — PROGRESS NOTES
History & Physical         Patient: Mason Parada    YOB: 1965    Medical Record Number: [id number]    Chief Complaints:   Chief Complaint   Patient presents with   • Right Shoulder - Pre-op Exam, Pain       History of Present Illness: 56 y.o. female comes in today of upcoming shoulder replacement surgery. Reports a long history of progressively worsening pain, motion loss, and dysfunction.  Describes current pain as severe.  Has failed prolonged conservative treatment.  Denies any new complaints or issues.    Allergies:   Allergies   Allergen Reactions   • Hydrocodone Itching   • Sulfa Antibiotics Itching     LIP swelling       Medications:   Home Medications:    Current Outpatient Medications:   •  Acetaminophen (TYLENOL ARTHRITIS PAIN PO), Take 500 mg by mouth 2 (Two) Times a Day., Disp: , Rfl:   •  Chlorhexidine Gluconate 2 % pads, Apply  topically. AS DIRECTED FOR PREOP, Disp: , Rfl:   •  Cholecalciferol (VITAMIN D) 1000 units tablet, Take 1,000 Units by mouth Daily. HOLDING FOR SURGERY, Disp: , Rfl:   •  famotidine (PEPCID) 40 MG tablet, Take 40 mg by mouth Every Morning., Disp: , Rfl:   •  hydrOXYzine (ATARAX) 25 MG tablet, Take 1 tablet by mouth Every 8 (Eight) Hours As Needed for Itching., Disp: 60 tablet, Rfl: 10  •  ketotifen (ZADITOR) 0.025 % ophthalmic solution, Administer 1 drop to both eyes 2 (Two) Times a Day., Disp: 1 each, Rfl: 5  •  lisinopril-hydrochlorothiazide (PRINZIDE,ZESTORETIC) 20-25 MG per tablet, Take 1 tablet by mouth Daily., Disp: 90 tablet, Rfl: 3  •  mupirocin (BACTROBAN) 2 % ointment, Apply 1 application topically to the appropriate area as directed. AS DIRECTED FOR PREOP, Disp: , Rfl:   •  tamoxifen (NOLVADEX) 20 MG chemo tablet, Take 1 tablet by mouth Daily., Disp: 90 tablet, Rfl: 2  •  traMADol (ULTRAM) 50 MG tablet, Take 1 tablet by mouth Every 4 (Four) Hours As Needed for Moderate Pain . Take 1 tablet by mouth every 8 hours as needed for moderate pain., Disp:  50 tablet, Rfl: 0  •  ibuprofen (ADVIL,MOTRIN) 800 MG tablet, Take 1 tablet by mouth Every 8 (Eight) Hours As Needed for Mild Pain ., Disp: 90 tablet, Rfl: 1  No current facility-administered medications for this visit.    Facility-Administered Medications Ordered in Other Visits:   •  Chlorhexidine Gluconate Cloth 2 % pads 1 each, 1 each, Apply externally, Take As Directed, Hari Ventura MD    Past Medical History:   Diagnosis Date   • Allergic    • Anxiety    • Carpal tunnel syndrome of right wrist    • Chest pain radiating to jaw     STRESS TEST OK 2018, IN EPIC - D/T STRESS, PANIC    • GERD (gastroesophageal reflux disease)    • H/O Septicemia (CMS/HCC) 2017    H/O Kidney stones   • Headache    • Heart palpitations    • History of breast cancer 2019    LEFT   • History of kidney stones    • History of panic attacks    • Hyperlipidemia    • Hypertension    • Injury of neck     Whiplash 25 years ago           Past Surgical History:   Procedure Laterality Date   • BREAST BIOPSY Left    • BREAST RECONSTRUCTION Left 3/5/2019    Procedure: left prepectorasl placement of tissue expander with alloderm;  Surgeon: Abdirashid Ding MD;  Location: [facility];  Service: Plastics   • BREAST TISSUE EXPANDER REMOVAL INSERTION OF IMPLANT Bilateral 2019    Procedure: LEFT REMOVAL OF TISSUE EXPANDER AND PLACEMENT OF IMPLANT RIGHT AUGMENTATION RIGHT MASTOPEXY FOR SYMMETRY;  Surgeon: Abdirashid Ding MD;  Location: [facility];  Service: Plastics   •  SECTION  1994    x1   • EXTRACORPOREAL SHOCK WAVE LITHOTRIPSY (ESWL)     • FAT GRAFTING Left 2019    Procedure: FAT GRAFTING;  Surgeon: Abdirashid Ding MD;  Location: [facility] OR;  Service: Plastics   • KIDNEY STONE SURGERY     • MASTECTOMY W/ SENTINEL NODE BIOPSY Left 3/5/2019    Procedure: LEFT SKIN-SPARING MASTECTOMY WITH SENTINEL LYMPH NODE BIOPSY;  Surgeon: Vonnie Cat MD;  Location: [facility] OR;  Service:  General          Social History     Occupational History   • Occupation: LPN     Employer: [employer]     Employer: [employer]   Tobacco Use   • Smoking status: Never Smoker   • Smokeless tobacco: Never Used   Vaping Use   • Vaping Use: Never used   Substance and Sexual Activity   • Alcohol use: Yes     Alcohol/week: 3.0 standard drinks     Types: 3 Glasses of wine per week   • Drug use: No   • Sexual activity: Defer      Social History     Social History Narrative   • Not on file          Family History   Problem Relation Age of Onset   • Hypertension Mother    • Other Mother         Blood clots   • Emphysema Father    • Lung cancer Father 62   • Bone cancer Father 62   • Lupus Sister    • Cancer Sister 28        Cervical   • Other Brother         kidney stone   • Heart disease Maternal Grandfather 55   • Diabetes Maternal Grandfather    • Hypertension Maternal Grandfather    • Pancreatic cancer Paternal Grandmother 68   • Other Paternal Grandfather         kidney stone   • Stroke Maternal Grandmother    • Breast cancer Paternal Aunt         late 60's   • Ovarian cancer Sister 34   • Cancer Other    • Malig Hyperthermia Neg Hx        Review of Systems:     Constitutional:  Denies fever, shaking or chills   Eyes:  Denies change in visual acuity   HEENT:  Denies nasal congestion or sore throat   Respiratory:  Denies cough or shortness of breath   Cardiovascular:  Denies chest pain or edema   GI:  Denies abdominal pain, nausea, vomiting, bloody stools or diarrhea   Musculoskeletal:  Denies numbness tingling or loss of motor function except as outlined above in history of present illness.  Integument:  Denies rash, lesion or ulceration   Neurologic:  Denies headache or focal weakness, deficits      All other pertinent positives and negatives as noted above in HPI.    Physical Exam: 56 y.o. female    Vitals:    10/08/21 1612   Temp: 97.8 °F (36.6 °C)   Weight: 67.1 kg (148 lb)   Height: 149.9 cm  "(59\")     General:  Patient is awake and alert.  Appears in no acute distress or discomfort.    Psych:  Affect and demeanor are appropriate.    Eyes:  Conjunctiva and sclera appear grossly normal.  Eyes track well and EOM seem to be intact.    Ears:  No gross abnormalities.  Hearing adequate for the exam.    Cardiovascular:  Regular rate and rhythm.    Lungs:  Good chest expansion.  Breathing unlabored.    Lymph:  No palpable adenopathy about neck or axilla.    Neck:  Supple.  Normal ROM.  Negative Spurling's for shoulder or arm pain.    Right upper extremity:  Skin benign and intact without evidence for swelling, masses or atrophy.  No palpable masses.  Moderate tenderness noted over anterior glenohumeral joint and rotator interval.  Shoulder motion is full, but uncomfortable.  No evident instability or apprehension.  Weakness with elevation in the scapular plane and external rotation.  Deltoid fires on exam.  Good strength in the lower arm and hand.  Intact sensation throughout the arm and hand palpable radial pulse with brisk cap refill.    Diagnostic Tests:  Lab Results   Component Value Date    GLUCOSE 104 (H) 10/06/2021    CALCIUM 9.9 10/06/2021     10/06/2021    K 3.6 10/06/2021    CO2 24.6 10/06/2021     10/06/2021    BUN 20 10/06/2021    CREATININE 0.73 10/06/2021    EGFRIFAFRI 104 02/19/2021    EGFRIFNONA 83 10/06/2021    BCR 27.4 (H) 10/06/2021    ANIONGAP 13.4 10/06/2021     Lab Results   Component Value Date    WBC 9.17 10/06/2021    HGB 12.9 10/06/2021    HCT 38.1 10/06/2021    MCV 87.8 10/06/2021     10/06/2021     No results found for: INR, PROTIME    Imaging:  Previous x-rays of the shoulder are reviewed.  She has an acromiohumeral interval measuring 5.5 mm.  No other significant findings.     The right shoulder MRI report from 5/28/2021 is reviewed.  The findings are as follows:    IMPRESSION:  Massive, chronic appearing, full-thickness right rotator cuff tear involving the " supraspinatus and infraspinatus tendons. There is tendon retraction and muscle atrophy.     Assessment:  Right shoulder chronic, irreparable rotator cuff tear    Plan: We had a thorough discussion regarding the risks, benefits and alternatives to an arthroplasty and non-surgical management versus surgery.  I explained that surgical risks include infection, hematoma, hardware related complications including failure of fixation, loosening, fracture, persistent pain and/or loss of motion, iatrogenic nerve and/or blood vessel injury resulting in permanent weakness, numbness or dysfunction, DVT, PE, positioning related neuropraxia, and anesthesia related complications resulting in death.  We discussed the indication for a reverse as opposed to a standard total shoulder and the risks inherent to the reverse including notching, glenoid loosening, instability, and traction related neuropraxia, any of which could result in persistent pain or problems requiring further surgery.  Lastly, we discussed the rehab and all that will be expected of the patient post operatively to ensure an optimal outcome.  The patient voiced understanding of the risks, benefits, and alternative forms of treatment that were discussed and the patient consents to proceed with the reverse arthroplasty.  Patient is planning for hospital dismissal same day of surgery.      She has an allergy to hydrocodone and will need Percocet postoperatively.      Arelis Mujica, APRN  10/08/21

## 2021-10-08 NOTE — H&P (VIEW-ONLY)
History & Physical         Patient: Mason Parada    YOB: 1965    Medical Record Number: [id number]    Chief Complaints:   Chief Complaint   Patient presents with   • Right Shoulder - Pre-op Exam, Pain       History of Present Illness: 56 y.o. female comes in today of upcoming shoulder replacement surgery. Reports a long history of progressively worsening pain, motion loss, and dysfunction.  Describes current pain as severe.  Has failed prolonged conservative treatment.  Denies any new complaints or issues.    Allergies:   Allergies   Allergen Reactions   • Hydrocodone Itching   • Sulfa Antibiotics Itching     LIP swelling       Medications:   Home Medications:    Current Outpatient Medications:   •  Acetaminophen (TYLENOL ARTHRITIS PAIN PO), Take 500 mg by mouth 2 (Two) Times a Day., Disp: , Rfl:   •  Chlorhexidine Gluconate 2 % pads, Apply  topically. AS DIRECTED FOR PREOP, Disp: , Rfl:   •  Cholecalciferol (VITAMIN D) 1000 units tablet, Take 1,000 Units by mouth Daily. HOLDING FOR SURGERY, Disp: , Rfl:   •  famotidine (PEPCID) 40 MG tablet, Take 40 mg by mouth Every Morning., Disp: , Rfl:   •  hydrOXYzine (ATARAX) 25 MG tablet, Take 1 tablet by mouth Every 8 (Eight) Hours As Needed for Itching., Disp: 60 tablet, Rfl: 10  •  ketotifen (ZADITOR) 0.025 % ophthalmic solution, Administer 1 drop to both eyes 2 (Two) Times a Day., Disp: 1 each, Rfl: 5  •  lisinopril-hydrochlorothiazide (PRINZIDE,ZESTORETIC) 20-25 MG per tablet, Take 1 tablet by mouth Daily., Disp: 90 tablet, Rfl: 3  •  mupirocin (BACTROBAN) 2 % ointment, Apply 1 application topically to the appropriate area as directed. AS DIRECTED FOR PREOP, Disp: , Rfl:   •  tamoxifen (NOLVADEX) 20 MG chemo tablet, Take 1 tablet by mouth Daily., Disp: 90 tablet, Rfl: 2  •  traMADol (ULTRAM) 50 MG tablet, Take 1 tablet by mouth Every 4 (Four) Hours As Needed for Moderate Pain . Take 1 tablet by mouth every 8 hours as needed for moderate pain., Disp:  50 tablet, Rfl: 0  •  ibuprofen (ADVIL,MOTRIN) 800 MG tablet, Take 1 tablet by mouth Every 8 (Eight) Hours As Needed for Mild Pain ., Disp: 90 tablet, Rfl: 1  No current facility-administered medications for this visit.    Facility-Administered Medications Ordered in Other Visits:   •  Chlorhexidine Gluconate Cloth 2 % pads 1 each, 1 each, Apply externally, Take As Directed, Hari Ventura MD    Past Medical History:   Diagnosis Date   • Allergic    • Anxiety    • Carpal tunnel syndrome of right wrist    • Chest pain radiating to jaw     STRESS TEST OK 2018, IN EPIC - D/T STRESS, PANIC    • GERD (gastroesophageal reflux disease)    • H/O Septicemia (CMS/HCC) 2017    H/O Kidney stones   • Headache    • Heart palpitations    • History of breast cancer 2019    LEFT   • History of kidney stones    • History of panic attacks    • Hyperlipidemia    • Hypertension    • Injury of neck     Whiplash 25 years ago           Past Surgical History:   Procedure Laterality Date   • BREAST BIOPSY Left    • BREAST RECONSTRUCTION Left 3/5/2019    Procedure: left prepectorasl placement of tissue expander with alloderm;  Surgeon: Abdirashid Ding MD;  Location: [facility];  Service: Plastics   • BREAST TISSUE EXPANDER REMOVAL INSERTION OF IMPLANT Bilateral 2019    Procedure: LEFT REMOVAL OF TISSUE EXPANDER AND PLACEMENT OF IMPLANT RIGHT AUGMENTATION RIGHT MASTOPEXY FOR SYMMETRY;  Surgeon: Abdirashid Ding MD;  Location: [facility];  Service: Plastics   •  SECTION  1994    x1   • EXTRACORPOREAL SHOCK WAVE LITHOTRIPSY (ESWL)     • FAT GRAFTING Left 2019    Procedure: FAT GRAFTING;  Surgeon: Abdirashid Ding MD;  Location: [facility];  Service: Plastics   • KIDNEY STONE SURGERY     • MASTECTOMY W/ SENTINEL NODE BIOPSY Left 3/5/2019    Procedure: LEFT SKIN-SPARING MASTECTOMY WITH SENTINEL LYMPH NODE BIOPSY;  Surgeon: Vonnie Cat MD;  Location: [facility];  Service:  General          Social History     Occupational History   • Occupation: LPN     Employer: [employer]     Employer: [employer]   Tobacco Use   • Smoking status: Never Smoker   • Smokeless tobacco: Never Used   Vaping Use   • Vaping Use: Never used   Substance and Sexual Activity   • Alcohol use: Yes     Alcohol/week: 3.0 standard drinks     Types: 3 Glasses of wine per week   • Drug use: No   • Sexual activity: Defer      Social History     Social History Narrative   • Not on file          Family History   Problem Relation Age of Onset   • Hypertension Mother    • Other Mother         Blood clots   • Emphysema Father    • Lung cancer Father 62   • Bone cancer Father 62   • Lupus Sister    • Cancer Sister 28        Cervical   • Other Brother         kidney stone   • Heart disease Maternal Grandfather 55   • Diabetes Maternal Grandfather    • Hypertension Maternal Grandfather    • Pancreatic cancer Paternal Grandmother 68   • Other Paternal Grandfather         kidney stone   • Stroke Maternal Grandmother    • Breast cancer Paternal Aunt         late 60's   • Ovarian cancer Sister 34   • Cancer Other    • Malig Hyperthermia Neg Hx        Review of Systems:     Constitutional:  Denies fever, shaking or chills   Eyes:  Denies change in visual acuity   HEENT:  Denies nasal congestion or sore throat   Respiratory:  Denies cough or shortness of breath   Cardiovascular:  Denies chest pain or edema   GI:  Denies abdominal pain, nausea, vomiting, bloody stools or diarrhea   Musculoskeletal:  Denies numbness tingling or loss of motor function except as outlined above in history of present illness.  Integument:  Denies rash, lesion or ulceration   Neurologic:  Denies headache or focal weakness, deficits      All other pertinent positives and negatives as noted above in HPI.    Physical Exam: 56 y.o. female    Vitals:    10/08/21 1612   Temp: 97.8 °F (36.6 °C)   Weight: 67.1 kg (148 lb)   Height: 149.9 cm  "(59\")     General:  Patient is awake and alert.  Appears in no acute distress or discomfort.    Psych:  Affect and demeanor are appropriate.    Eyes:  Conjunctiva and sclera appear grossly normal.  Eyes track well and EOM seem to be intact.    Ears:  No gross abnormalities.  Hearing adequate for the exam.    Cardiovascular:  Regular rate and rhythm.    Lungs:  Good chest expansion.  Breathing unlabored.    Lymph:  No palpable adenopathy about neck or axilla.    Neck:  Supple.  Normal ROM.  Negative Spurling's for shoulder or arm pain.    Right upper extremity:  Skin benign and intact without evidence for swelling, masses or atrophy.  No palpable masses.  Moderate tenderness noted over anterior glenohumeral joint and rotator interval.  Shoulder motion is full, but uncomfortable.  No evident instability or apprehension.  Weakness with elevation in the scapular plane and external rotation.  Deltoid fires on exam.  Good strength in the lower arm and hand.  Intact sensation throughout the arm and hand palpable radial pulse with brisk cap refill.    Diagnostic Tests:  Lab Results   Component Value Date    GLUCOSE 104 (H) 10/06/2021    CALCIUM 9.9 10/06/2021     10/06/2021    K 3.6 10/06/2021    CO2 24.6 10/06/2021     10/06/2021    BUN 20 10/06/2021    CREATININE 0.73 10/06/2021    EGFRIFAFRI 104 02/19/2021    EGFRIFNONA 83 10/06/2021    BCR 27.4 (H) 10/06/2021    ANIONGAP 13.4 10/06/2021     Lab Results   Component Value Date    WBC 9.17 10/06/2021    HGB 12.9 10/06/2021    HCT 38.1 10/06/2021    MCV 87.8 10/06/2021     10/06/2021     No results found for: INR, PROTIME    Imaging:  Previous x-rays of the shoulder are reviewed.  She has an acromiohumeral interval measuring 5.5 mm.  No other significant findings.     The right shoulder MRI report from 5/28/2021 is reviewed.  The findings are as follows:    IMPRESSION:  Massive, chronic appearing, full-thickness right rotator cuff tear involving the " supraspinatus and infraspinatus tendons. There is tendon retraction and muscle atrophy.     Assessment:  Right shoulder chronic, irreparable rotator cuff tear    Plan: We had a thorough discussion regarding the risks, benefits and alternatives to an arthroplasty and non-surgical management versus surgery.  I explained that surgical risks include infection, hematoma, hardware related complications including failure of fixation, loosening, fracture, persistent pain and/or loss of motion, iatrogenic nerve and/or blood vessel injury resulting in permanent weakness, numbness or dysfunction, DVT, PE, positioning related neuropraxia, and anesthesia related complications resulting in death.  We discussed the indication for a reverse as opposed to a standard total shoulder and the risks inherent to the reverse including notching, glenoid loosening, instability, and traction related neuropraxia, any of which could result in persistent pain or problems requiring further surgery.  Lastly, we discussed the rehab and all that will be expected of the patient post operatively to ensure an optimal outcome.  The patient voiced understanding of the risks, benefits, and alternative forms of treatment that were discussed and the patient consents to proceed with the reverse arthroplasty.  Patient is planning for hospital dismissal same day of surgery.      She has an allergy to hydrocodone and will need Percocet postoperatively.      Arelis Mujica, APRN  10/08/21

## 2021-10-12 ENCOUNTER — LAB (OUTPATIENT)
Dept: LAB | Facility: HOSPITAL | Age: 56
End: 2021-10-12

## 2021-10-12 LAB — SARS-COV-2 ORF1AB RESP QL NAA+PROBE: NOT DETECTED

## 2021-10-12 PROCEDURE — U0004 COV-19 TEST NON-CDC HGH THRU: HCPCS

## 2021-10-12 PROCEDURE — C9803 HOPD COVID-19 SPEC COLLECT: HCPCS

## 2021-10-13 ENCOUNTER — TELEPHONE (OUTPATIENT)
Dept: ONCOLOGY | Facility: CLINIC | Age: 56
End: 2021-10-13

## 2021-10-13 ENCOUNTER — TELEPHONE (OUTPATIENT)
Dept: ORTHOPEDIC SURGERY | Facility: HOSPITAL | Age: 56
End: 2021-10-13

## 2021-10-13 NOTE — TELEPHONE ENCOUNTER
Caller: Mason Parada    Relationship to patient: Self    Best call back number: [phone]    Chief complaint: HAVING SURGERY 10/14    Type of visit: LAB AND FOLLOW UP    Requested date: 12/17    If rescheduling, when is the original appointment: 10/15    Additional notes: PLEASE CALL ONCE R/S. HUB UNABLE TO R/S WITHIN TIMEFRAME.

## 2021-10-13 NOTE — TELEPHONE ENCOUNTER
Pain Risk:  ? Currently on narcotics  ? ETOH > 3 drinks/day  ? Pain management patient  ? Current cannaboid use  No Issues   Cardiac-Neuro Risk:  ? Arrhythmias  ? Stent/MI  ? Pacer  ? Heart Failure  ? Stroke with residual Choose an item.  HTN  Respiratory Risk:  ? Sleep apnea  ? CPAP machine use  ? Nightly snoring  ? Asthma/COPD  No Issues   Diabetic Risk:  HgA1C (within 90 days prior to PAT):  Click or tap here to enter text.  ? Insulin use  ? More than 1 diabetic medication  No Issues   Urinary retention:  No    Caregiver 24-48hrs post-discharge: Home With Family     Home needs:  ? None/will need before discharge  ? Have walker and/or cane  ? Steps Click or tap here to enter text.  Total Shoulder   Discharge Plan:  Total Shoulder no needs at this time    Prescriptions: Meds to bed    Home medications:   ? Blood thinner/anti-coag therapy  ? BPH or diuretic- Flomax  ? BP meds    Educate patient on spinal anesthesia/pain control:  ? patient verbalize understanding    Educate patient on hospital course/timeline:  ?  patient verbalize understanding    Joint Care Class:  ?  yes ? no

## 2021-10-14 ENCOUNTER — ANESTHESIA EVENT (OUTPATIENT)
Dept: PERIOP | Facility: HOSPITAL | Age: 56
End: 2021-10-14

## 2021-10-14 ENCOUNTER — READMISSION MANAGEMENT (OUTPATIENT)
Dept: CALL CENTER | Facility: HOSPITAL | Age: 56
End: 2021-10-14

## 2021-10-14 ENCOUNTER — ANESTHESIA (OUTPATIENT)
Dept: PERIOP | Facility: HOSPITAL | Age: 56
End: 2021-10-14

## 2021-10-14 ENCOUNTER — APPOINTMENT (OUTPATIENT)
Dept: GENERAL RADIOLOGY | Facility: HOSPITAL | Age: 56
End: 2021-10-14

## 2021-10-14 ENCOUNTER — HOSPITAL ENCOUNTER (OUTPATIENT)
Facility: HOSPITAL | Age: 56
Discharge: HOME | End: 2021-10-14
Attending: ORTHOPAEDIC SURGERY | Admitting: ORTHOPAEDIC SURGERY

## 2021-10-14 VITALS
BODY MASS INDEX: 29.43 KG/M2 | HEIGHT: 59 IN | RESPIRATION RATE: 16 BRPM | TEMPERATURE: 97.5 F | OXYGEN SATURATION: 95 % | DIASTOLIC BLOOD PRESSURE: 69 MMHG | SYSTOLIC BLOOD PRESSURE: 142 MMHG | HEART RATE: 81 BPM | WEIGHT: 146 LBS

## 2021-10-14 DIAGNOSIS — S46.011A TRAUMATIC TEAR OF RIGHT ROTATOR CUFF, INITIAL ENCOUNTER: ICD-10-CM

## 2021-10-14 DIAGNOSIS — S46.011A TRAUMATIC TEAR OF RIGHT ROTATOR CUFF, UNSPECIFIED TEAR EXTENT, INITIAL ENCOUNTER: ICD-10-CM

## 2021-10-14 PROCEDURE — 25010000002 VANCOMYCIN PER 500 MG: Performed by: ORTHOPAEDIC SURGERY

## 2021-10-14 PROCEDURE — 76942 ECHO GUIDE FOR BIOPSY: CPT | Performed by: ORTHOPAEDIC SURGERY

## 2021-10-14 PROCEDURE — 73030 X-RAY EXAM OF SHOULDER: CPT

## 2021-10-14 PROCEDURE — C9290 INJ, BUPIVACAINE LIPOSOME: HCPCS | Performed by: ANESTHESIOLOGY

## 2021-10-14 PROCEDURE — 25010000002 PHENYLEPHRINE 10 MG/ML SOLUTION 5 ML VIAL: Performed by: NURSE ANESTHETIST, CERTIFIED REGISTERED

## 2021-10-14 PROCEDURE — 25010000002 DEXAMETHASONE PER 1 MG: Performed by: NURSE ANESTHETIST, CERTIFIED REGISTERED

## 2021-10-14 PROCEDURE — 25010000003 CEFAZOLIN IN DEXTROSE 2-4 GM/100ML-% SOLUTION: Performed by: ORTHOPAEDIC SURGERY

## 2021-10-14 PROCEDURE — 25010000002 PROPOFOL 10 MG/ML EMULSION: Performed by: NURSE ANESTHETIST, CERTIFIED REGISTERED

## 2021-10-14 PROCEDURE — 23472 RECONSTRUCT SHOULDER JOINT: CPT | Performed by: ORTHOPAEDIC SURGERY

## 2021-10-14 PROCEDURE — 25010000003 MEPIVACAINE PER 10 ML: Performed by: ANESTHESIOLOGY

## 2021-10-14 PROCEDURE — G0378 HOSPITAL OBSERVATION PER HR: HCPCS

## 2021-10-14 PROCEDURE — 97165 OT EVAL LOW COMPLEX 30 MIN: CPT | Performed by: OCCUPATIONAL THERAPIST

## 2021-10-14 PROCEDURE — 25010000002 ONDANSETRON PER 1 MG: Performed by: NURSE ANESTHETIST, CERTIFIED REGISTERED

## 2021-10-14 PROCEDURE — 25010000002 NEOSTIGMINE 5 MG/10ML SOLUTION: Performed by: NURSE ANESTHETIST, CERTIFIED REGISTERED

## 2021-10-14 PROCEDURE — 23472 RECONSTRUCT SHOULDER JOINT: CPT | Performed by: NURSE PRACTITIONER

## 2021-10-14 PROCEDURE — C1713 ANCHOR/SCREW BN/BN,TIS/BN: HCPCS | Performed by: ORTHOPAEDIC SURGERY

## 2021-10-14 PROCEDURE — 25010000002 MIDAZOLAM PER 1 MG: Performed by: ANESTHESIOLOGY

## 2021-10-14 PROCEDURE — C1889 IMPLANT/INSERT DEVICE, NOC: HCPCS | Performed by: ORTHOPAEDIC SURGERY

## 2021-10-14 PROCEDURE — 25010000002 FENTANYL CITRATE (PF) 50 MCG/ML SOLUTION: Performed by: ANESTHESIOLOGY

## 2021-10-14 PROCEDURE — 25010000003 BUPIVACAINE LIPOSOME 1.3 % SUSPENSION: Performed by: ANESTHESIOLOGY

## 2021-10-14 PROCEDURE — C1776 JOINT DEVICE (IMPLANTABLE): HCPCS | Performed by: ORTHOPAEDIC SURGERY

## 2021-10-14 DEVICE — IMPLANTABLE DEVICE
Type: IMPLANTABLE DEVICE | Site: SHOULDER | Status: FUNCTIONAL
Brand: COMPREHENSIVE® REVERSE SHOULDER

## 2021-10-14 DEVICE — TRY HUM/SHLDR COMPREHENSIVE/REVERSE MINI COCR STD 40MM: Type: IMPLANTABLE DEVICE | Site: SHOULDER | Status: FUNCTIONAL

## 2021-10-14 DEVICE — IMPLANTABLE DEVICE
Type: IMPLANTABLE DEVICE | Site: SHOULDER | Status: FUNCTIONAL
Brand: COMPREHENSIVE REVERSE SHOULDER

## 2021-10-14 DEVICE — TOTL SHLDER REV: Type: IMPLANTABLE DEVICE | Site: SHOULDER | Status: FUNCTIONAL

## 2021-10-14 DEVICE — SUT NONABS MAXBRAID NMBR5 K60 38IN WHT/BLU: Type: IMPLANTABLE DEVICE | Site: SHOULDER | Status: FUNCTIONAL

## 2021-10-14 DEVICE — DEV CONTRL TISS STRATAFIXSPIRALMNCRYL PLSPS2 REV3/0 45CM: Type: IMPLANTABLE DEVICE | Site: SHOULDER | Status: FUNCTIONAL

## 2021-10-14 DEVICE — BEAR HUM PROLNG STD 36MM: Type: IMPLANTABLE DEVICE | Site: SHOULDER | Status: FUNCTIONAL

## 2021-10-14 RX ORDER — CHLORHEXIDINE GLUCONATE 500 MG/1
CLOTH TOPICAL TAKE AS DIRECTED
Status: DISCONTINUED | OUTPATIENT
Start: 2021-10-14 | End: 2021-10-14 | Stop reason: HOSPADM

## 2021-10-14 RX ORDER — PROMETHAZINE HYDROCHLORIDE 25 MG/1
25 TABLET ORAL ONCE AS NEEDED
Status: DISCONTINUED | OUTPATIENT
Start: 2021-10-14 | End: 2021-10-14 | Stop reason: HOSPADM

## 2021-10-14 RX ORDER — PROPOFOL 10 MG/ML
VIAL (ML) INTRAVENOUS AS NEEDED
Status: DISCONTINUED | OUTPATIENT
Start: 2021-10-14 | End: 2021-10-14 | Stop reason: SURG

## 2021-10-14 RX ORDER — BUPIVACAINE HYDROCHLORIDE 2.5 MG/ML
INJECTION, SOLUTION EPIDURAL; INFILTRATION; INTRACAUDAL
Status: COMPLETED | OUTPATIENT
Start: 2021-10-14 | End: 2021-10-14

## 2021-10-14 RX ORDER — LIDOCAINE HYDROCHLORIDE 10 MG/ML
0.5 INJECTION, SOLUTION EPIDURAL; INFILTRATION; INTRACAUDAL; PERINEURAL ONCE AS NEEDED
Status: DISCONTINUED | OUTPATIENT
Start: 2021-10-14 | End: 2021-10-14 | Stop reason: HOSPADM

## 2021-10-14 RX ORDER — SODIUM CHLORIDE, SODIUM LACTATE, POTASSIUM CHLORIDE, CALCIUM CHLORIDE 600; 310; 30; 20 MG/100ML; MG/100ML; MG/100ML; MG/100ML
9 INJECTION, SOLUTION INTRAVENOUS CONTINUOUS
Status: DISCONTINUED | OUTPATIENT
Start: 2021-10-14 | End: 2021-10-14 | Stop reason: HOSPADM

## 2021-10-14 RX ORDER — ONDANSETRON 2 MG/ML
INJECTION INTRAMUSCULAR; INTRAVENOUS AS NEEDED
Status: DISCONTINUED | OUTPATIENT
Start: 2021-10-14 | End: 2021-10-14 | Stop reason: SURG

## 2021-10-14 RX ORDER — DIPHENHYDRAMINE HCL 25 MG
25 CAPSULE ORAL
Status: DISCONTINUED | OUTPATIENT
Start: 2021-10-14 | End: 2021-10-14 | Stop reason: HOSPADM

## 2021-10-14 RX ORDER — HYDRALAZINE HYDROCHLORIDE 20 MG/ML
5 INJECTION INTRAMUSCULAR; INTRAVENOUS
Status: DISCONTINUED | OUTPATIENT
Start: 2021-10-14 | End: 2021-10-14 | Stop reason: HOSPADM

## 2021-10-14 RX ORDER — FENTANYL CITRATE 50 UG/ML
50 INJECTION, SOLUTION INTRAMUSCULAR; INTRAVENOUS
Status: DISCONTINUED | OUTPATIENT
Start: 2021-10-14 | End: 2021-10-14 | Stop reason: HOSPADM

## 2021-10-14 RX ORDER — IBUPROFEN 600 MG/1
600 TABLET ORAL ONCE AS NEEDED
Status: DISCONTINUED | OUTPATIENT
Start: 2021-10-14 | End: 2021-10-14 | Stop reason: HOSPADM

## 2021-10-14 RX ORDER — LIDOCAINE HYDROCHLORIDE 20 MG/ML
INJECTION, SOLUTION INFILTRATION; PERINEURAL AS NEEDED
Status: DISCONTINUED | OUTPATIENT
Start: 2021-10-14 | End: 2021-10-14 | Stop reason: SURG

## 2021-10-14 RX ORDER — SODIUM CHLORIDE 0.9 % (FLUSH) 0.9 %
3-10 SYRINGE (ML) INJECTION AS NEEDED
Status: DISCONTINUED | OUTPATIENT
Start: 2021-10-14 | End: 2021-10-14 | Stop reason: HOSPADM

## 2021-10-14 RX ORDER — FENTANYL CITRATE 50 UG/ML
INJECTION, SOLUTION INTRAMUSCULAR; INTRAVENOUS
Status: COMPLETED | OUTPATIENT
Start: 2021-10-14 | End: 2021-10-14

## 2021-10-14 RX ORDER — FLUMAZENIL 0.1 MG/ML
0.2 INJECTION INTRAVENOUS AS NEEDED
Status: DISCONTINUED | OUTPATIENT
Start: 2021-10-14 | End: 2021-10-14 | Stop reason: HOSPADM

## 2021-10-14 RX ORDER — MIDAZOLAM HYDROCHLORIDE 1 MG/ML
1 INJECTION INTRAMUSCULAR; INTRAVENOUS
Status: DISCONTINUED | OUTPATIENT
Start: 2021-10-14 | End: 2021-10-14 | Stop reason: HOSPADM

## 2021-10-14 RX ORDER — SODIUM CHLORIDE 0.9 % (FLUSH) 0.9 %
3 SYRINGE (ML) INJECTION EVERY 12 HOURS SCHEDULED
Status: DISCONTINUED | OUTPATIENT
Start: 2021-10-14 | End: 2021-10-14 | Stop reason: HOSPADM

## 2021-10-14 RX ORDER — VANCOMYCIN HYDROCHLORIDE 1 G/200ML
15 INJECTION, SOLUTION INTRAVENOUS ONCE
Status: COMPLETED | OUTPATIENT
Start: 2021-10-14 | End: 2021-10-14

## 2021-10-14 RX ORDER — OXYCODONE AND ACETAMINOPHEN 10; 325 MG/1; MG/1
1 TABLET ORAL EVERY 4 HOURS PRN
Status: DISCONTINUED | OUTPATIENT
Start: 2021-10-14 | End: 2021-10-14 | Stop reason: HOSPADM

## 2021-10-14 RX ORDER — ROCURONIUM BROMIDE 10 MG/ML
INJECTION, SOLUTION INTRAVENOUS AS NEEDED
Status: DISCONTINUED | OUTPATIENT
Start: 2021-10-14 | End: 2021-10-14 | Stop reason: SURG

## 2021-10-14 RX ORDER — MELOXICAM 15 MG/1
15 TABLET ORAL ONCE
Status: COMPLETED | OUTPATIENT
Start: 2021-10-14 | End: 2021-10-14

## 2021-10-14 RX ORDER — EPHEDRINE SULFATE 50 MG/ML
5 INJECTION, SOLUTION INTRAVENOUS ONCE AS NEEDED
Status: DISCONTINUED | OUTPATIENT
Start: 2021-10-14 | End: 2021-10-14 | Stop reason: HOSPADM

## 2021-10-14 RX ORDER — PREGABALIN 75 MG/1
150 CAPSULE ORAL ONCE
Status: COMPLETED | OUTPATIENT
Start: 2021-10-14 | End: 2021-10-14

## 2021-10-14 RX ORDER — ONDANSETRON 4 MG/1
4 TABLET, FILM COATED ORAL EVERY 8 HOURS PRN
Qty: 30 TABLET | Refills: 0 | Status: SHIPPED | OUTPATIENT
Start: 2021-10-14 | End: 2021-11-22

## 2021-10-14 RX ORDER — GLYCOPYRROLATE 0.2 MG/ML
INJECTION INTRAMUSCULAR; INTRAVENOUS AS NEEDED
Status: DISCONTINUED | OUTPATIENT
Start: 2021-10-14 | End: 2021-10-14 | Stop reason: SURG

## 2021-10-14 RX ORDER — HYDROCODONE BITARTRATE AND ACETAMINOPHEN 7.5; 325 MG/1; MG/1
1 TABLET ORAL ONCE AS NEEDED
Status: DISCONTINUED | OUTPATIENT
Start: 2021-10-14 | End: 2021-10-14 | Stop reason: HOSPADM

## 2021-10-14 RX ORDER — MAGNESIUM HYDROXIDE 1200 MG/15ML
LIQUID ORAL AS NEEDED
Status: DISCONTINUED | OUTPATIENT
Start: 2021-10-14 | End: 2021-10-14 | Stop reason: HOSPADM

## 2021-10-14 RX ORDER — FAMOTIDINE 10 MG/ML
20 INJECTION, SOLUTION INTRAVENOUS ONCE
Status: COMPLETED | OUTPATIENT
Start: 2021-10-14 | End: 2021-10-14

## 2021-10-14 RX ORDER — LABETALOL HYDROCHLORIDE 5 MG/ML
5 INJECTION, SOLUTION INTRAVENOUS
Status: DISCONTINUED | OUTPATIENT
Start: 2021-10-14 | End: 2021-10-14 | Stop reason: HOSPADM

## 2021-10-14 RX ORDER — DOCUSATE SODIUM 100 MG/1
100 CAPSULE, LIQUID FILLED ORAL 2 TIMES DAILY
Qty: 60 CAPSULE | Refills: 0 | Status: SHIPPED | OUTPATIENT
Start: 2021-10-14 | End: 2021-11-22

## 2021-10-14 RX ORDER — MIDAZOLAM HYDROCHLORIDE 1 MG/ML
INJECTION INTRAMUSCULAR; INTRAVENOUS
Status: COMPLETED | OUTPATIENT
Start: 2021-10-14 | End: 2021-10-14

## 2021-10-14 RX ORDER — ACETAMINOPHEN 325 MG/1
1000 TABLET ORAL ONCE
Status: COMPLETED | OUTPATIENT
Start: 2021-10-14 | End: 2021-10-14

## 2021-10-14 RX ORDER — PROMETHAZINE HYDROCHLORIDE 25 MG/1
25 SUPPOSITORY RECTAL ONCE AS NEEDED
Status: DISCONTINUED | OUTPATIENT
Start: 2021-10-14 | End: 2021-10-14 | Stop reason: HOSPADM

## 2021-10-14 RX ORDER — HYDROMORPHONE HYDROCHLORIDE 1 MG/ML
0.5 INJECTION, SOLUTION INTRAMUSCULAR; INTRAVENOUS; SUBCUTANEOUS
Status: DISCONTINUED | OUTPATIENT
Start: 2021-10-14 | End: 2021-10-14 | Stop reason: HOSPADM

## 2021-10-14 RX ORDER — TRANEXAMIC ACID 100 MG/ML
INJECTION, SOLUTION INTRAVENOUS AS NEEDED
Status: DISCONTINUED | OUTPATIENT
Start: 2021-10-14 | End: 2021-10-14 | Stop reason: SURG

## 2021-10-14 RX ORDER — ONDANSETRON 2 MG/ML
4 INJECTION INTRAMUSCULAR; INTRAVENOUS ONCE AS NEEDED
Status: COMPLETED | OUTPATIENT
Start: 2021-10-14 | End: 2021-10-14

## 2021-10-14 RX ORDER — OXYCODONE AND ACETAMINOPHEN 7.5; 325 MG/1; MG/1
1-2 TABLET ORAL EVERY 4 HOURS PRN
Qty: 50 TABLET | Refills: 0 | Status: SHIPPED | OUTPATIENT
Start: 2021-10-14 | End: 2021-10-19 | Stop reason: SDUPTHER

## 2021-10-14 RX ORDER — DIPHENHYDRAMINE HYDROCHLORIDE 50 MG/ML
12.5 INJECTION INTRAMUSCULAR; INTRAVENOUS
Status: DISCONTINUED | OUTPATIENT
Start: 2021-10-14 | End: 2021-10-14 | Stop reason: HOSPADM

## 2021-10-14 RX ORDER — NEOSTIGMINE METHYLSULFATE 0.5 MG/ML
INJECTION, SOLUTION INTRAVENOUS AS NEEDED
Status: DISCONTINUED | OUTPATIENT
Start: 2021-10-14 | End: 2021-10-14 | Stop reason: SURG

## 2021-10-14 RX ORDER — CEFAZOLIN SODIUM 2 G/100ML
2 INJECTION, SOLUTION INTRAVENOUS ONCE
Status: COMPLETED | OUTPATIENT
Start: 2021-10-14 | End: 2021-10-14

## 2021-10-14 RX ORDER — DEXAMETHASONE SODIUM PHOSPHATE 10 MG/ML
INJECTION INTRAMUSCULAR; INTRAVENOUS AS NEEDED
Status: DISCONTINUED | OUTPATIENT
Start: 2021-10-14 | End: 2021-10-14 | Stop reason: SURG

## 2021-10-14 RX ORDER — NALOXONE HCL 0.4 MG/ML
0.2 VIAL (ML) INJECTION AS NEEDED
Status: DISCONTINUED | OUTPATIENT
Start: 2021-10-14 | End: 2021-10-14 | Stop reason: HOSPADM

## 2021-10-14 RX ADMIN — TRANEXAMIC ACID 1000 MG: 1 INJECTION, SOLUTION INTRAVENOUS at 11:12

## 2021-10-14 RX ADMIN — DEXAMETHASONE SODIUM PHOSPHATE 8 MG: 10 INJECTION INTRAMUSCULAR; INTRAVENOUS at 10:56

## 2021-10-14 RX ADMIN — PHENYLEPHRINE HYDROCHLORIDE 0.5 MCG/KG/MIN: 10 INJECTION INTRAVENOUS at 10:47

## 2021-10-14 RX ADMIN — BUPIVACAINE 10 ML: 13.3 INJECTION, SUSPENSION, LIPOSOMAL INFILTRATION at 09:27

## 2021-10-14 RX ADMIN — NEOSTIGMINE METHYLSULFATE 2 MG: 0.5 INJECTION INTRAVENOUS at 11:59

## 2021-10-14 RX ADMIN — PROPOFOL 200 MG: 10 INJECTION, EMULSION INTRAVENOUS at 10:33

## 2021-10-14 RX ADMIN — MEPIVACAINE HYDROCHLORIDE 5 ML: 15 INJECTION, SOLUTION EPIDURAL; INFILTRATION at 09:27

## 2021-10-14 RX ADMIN — BUPIVACAINE HYDROCHLORIDE 30 ML: 2.5 INJECTION, SOLUTION EPIDURAL; INFILTRATION; INTRACAUDAL; PERINEURAL at 09:27

## 2021-10-14 RX ADMIN — MELOXICAM 15 MG: 15 TABLET ORAL at 09:06

## 2021-10-14 RX ADMIN — OXYCODONE HYDROCHLORIDE AND ACETAMINOPHEN 1 TABLET: 10; 325 TABLET ORAL at 13:23

## 2021-10-14 RX ADMIN — MIDAZOLAM 2 MG: 1 INJECTION INTRAMUSCULAR; INTRAVENOUS at 09:39

## 2021-10-14 RX ADMIN — ONDANSETRON 4 MG: 2 INJECTION INTRAMUSCULAR; INTRAVENOUS at 14:32

## 2021-10-14 RX ADMIN — VANCOMYCIN HYDROCHLORIDE 1000 MG: 1 INJECTION, SOLUTION INTRAVENOUS at 09:10

## 2021-10-14 RX ADMIN — GLYCOPYRROLATE 0.4 MG: 0.2 INJECTION INTRAMUSCULAR; INTRAVENOUS at 11:59

## 2021-10-14 RX ADMIN — FENTANYL CITRATE 50 MCG: 0.05 INJECTION, SOLUTION INTRAMUSCULAR; INTRAVENOUS at 09:39

## 2021-10-14 RX ADMIN — LIDOCAINE HYDROCHLORIDE 60 MG: 20 INJECTION, SOLUTION INFILTRATION; PERINEURAL at 10:33

## 2021-10-14 RX ADMIN — ONDANSETRON 4 MG: 2 INJECTION INTRAMUSCULAR; INTRAVENOUS at 11:59

## 2021-10-14 RX ADMIN — ACETAMINOPHEN 975 MG: 325 TABLET, FILM COATED ORAL at 09:06

## 2021-10-14 RX ADMIN — SODIUM CHLORIDE, POTASSIUM CHLORIDE, SODIUM LACTATE AND CALCIUM CHLORIDE 9 ML/HR: 600; 310; 30; 20 INJECTION, SOLUTION INTRAVENOUS at 08:50

## 2021-10-14 RX ADMIN — PREGABALIN 150 MG: 75 CAPSULE ORAL at 09:06

## 2021-10-14 RX ADMIN — SODIUM CHLORIDE, POTASSIUM CHLORIDE, SODIUM LACTATE AND CALCIUM CHLORIDE: 600; 310; 30; 20 INJECTION, SOLUTION INTRAVENOUS at 12:19

## 2021-10-14 RX ADMIN — FAMOTIDINE 20 MG: 10 INJECTION INTRAVENOUS at 09:07

## 2021-10-14 RX ADMIN — ROCURONIUM BROMIDE 40 MG: 50 INJECTION INTRAVENOUS at 10:33

## 2021-10-14 RX ADMIN — CEFAZOLIN SODIUM 2 G: 2 INJECTION, SOLUTION INTRAVENOUS at 10:29

## 2021-10-14 NOTE — PLAN OF CARE
Goal Outcome Evaluation:  Plan of Care Reviewed With: patient, daughter        Progress: no change  Outcome Summary: pt admitted and is s/p pod 0 and is same day dc on OSC. pt was nauseated and could not complete tsf or UBD yet. Ed pt and her dght on UBD technique, shower technique, HEP w hand out, ed on precautions post sugery. Pt ed on NWB and sling use. Pt will dc w HEP and out patient in a couple weeks.  OT wore all PPE, washed hands before/after.

## 2021-10-14 NOTE — ANESTHESIA PROCEDURE NOTES
Airway  Urgency: elective    Date/Time: 10/14/2021 10:37 AM  Airway not difficult    General Information and Staff    Patient location during procedure: OR  Anesthesiologist: Kasi Kim MD  CRNA: Jaylen Patrick CRNA    Indications and Patient Condition  Indications for airway management: airway protection    Preoxygenated: yes  MILS maintained throughout  Mask difficulty assessment: 1 - vent by mask    Final Airway Details  Final airway type: endotracheal airway      Successful airway: ETT  Cuffed: yes   Successful intubation technique: direct laryngoscopy  Facilitating devices/methods: intubating stylet  Endotracheal tube insertion site: oral  Blade: Ayala  Blade size: 2  ETT size (mm): 6.5  Cormack-Lehane Classification: grade I - full view of glottis  Placement verified by: chest auscultation and capnometry   Cuff volume (mL): 6  Measured from: lips  ETT/EBT  to lips (cm): 21  Number of attempts at approach: 1  Assessment: lips, teeth, and gum same as pre-op and atraumatic intubation

## 2021-10-14 NOTE — OP NOTE
Orthopaedic Operative Note    Facility: [facility]    Patient: Mason Parada    Medical Record Number: [id number]    YOB: 1965    Dictating Surgeon: Hari Ventura M.D.*    Primary Care Physician: Ashlee Howe APRN    Date of Operation: 10/14/2021    Pre-Operative Diagnosis:  Right shoulder chronic, irreparable rotator cuff tear    Post-Operative Diagnosis: Right shoulder chronic, irreparable rotator cuff tear with associated biceps tendinopathy    Procedure Performed:    1.  Right reverse total shoulder arthroplasty    2.  Tenodesis of long head of biceps tendon    Surgeon: Hari Ventura MD     Assistant: LANE Khan whose assistance was critical for help with patient positioning, suctioning and irrigation, retraction, manipulation of the extremity for insertion of the implants, wound closure and application of the bandages.  Her assistance was critical to the success of this case.    Anesthesia: Regional followed by Gen.    Complications: None.     Estimated Blood Loss: Less than 50 mL.     Implants: 1.  Biomet size 7 mm micro comprehensive stem  2.  Small augmented mini glenoid baseplate with one 6.5 mm central compression screw and 4 peripheral 4.75 mm locking screws  3.  Size 36 standard glenosphere  4.  Standard thickness, standard offset humeral tray with 36 mm standard thickness bearing    Specimens: * No orders in the log *    Brief Operative Indication:  Ms. Parada had a history of worsening right shoulder pain and dysfunction which had been nonresponsive to conservative treatment.  We had a thorough discussion regarding the risks, benefits and alternatives to an arthroplasty and non-surgical management versus surgery.  I explained that surgical risks include infection, hematoma, hardware related complications including failure of fixation, loosening, fracture, persistent pain and/or loss of motion, iatrogenic nerve and/or blood vessel injury  resulting in permanent weakness, numbness or dysfunction, DVT, PE, positioning related neuropraxia, and anesthesia related complications resulting in death.  We discussed the indication for a reverse as opposed to a standard total shoulder and the risks inherent to the reverse including notching, glenoid loosening, instability, and traction related neuropraxia, any of which could result in persistent pain or problems requiring further surgery.  Last, we discussed the rehab and all that will be expected of the patient post operatively to ensure an optimal outcome.  The patient voiced understanding of the risks, benefits, and alternative forms of treatment that were discussed and the patient consented to proceed.    Description of the procedure in detail:  The patient and operative site were identified in the preoperative holding area.  The surgical site was marked.  Adequate regional anesthesia was administered.  The patient was then taken to the operating room.  The patient was placed on the operating table where adequate general anesthesia was administered.  The patient was then repositioned into the modified beachchair position with the head and neck in neutral alignment.  All bony prominences were carefully padded and protected.    The right upper extremity was then prepped and draped in the standard, sterile fashion.  The extremity was cleaned with an alcohol solution.  A Hibiclens scrub was performed and then the extremity was prepped with 2 ChloraPrep preps.  I allowed this to dry for 3 minutes before the draping procedure was carried out.    A timeout was taken and preoperative antibiotics administered.  Transexamic acid was administered at this time as well.  Following this, I began by fashioning an approximately 6 cm incision over the anterior aspect of the shoulder.  This was carried down through the skin and subcutaneous tissues.  Full-thickness medial and lateral skin flaps were developed.  The interval  between the deltoid and pectoralis was carefully identified and developed.  The underlying cephalic vein was dissected out and retracted laterally.  This structure was kept protected throughout the case.    The clavipectoral fascia was divided.  The strap muscles were retracted medially.  The biceps groove was identified.  The biceps tendon was carefully dissected out.  She had significant tendinopathy of the upper and intra-articular portions of the biceps.  The biceps was released from its attachment to the supraglenoid tubercle using curved Morillo scissors.  The diseased upper portion of the biceps was removed.  The remaining intact tendon was tenodesed to the conjoined tendon using multiple max braid sutures which were tied using 6 throw surgeon's knots.    I then directed my attention to the shoulder.  As expected, there was a large, retracted tear of the rotator cuff involving the entirety of the supraspinatus and infraspinatus.  There was significant arthrosis of the visible portion of the humeral head.  The subscapularis was carefully tagged and released.  I left a small cuff of tissue on the lesser tuberosity for a later anatomic repair of this structure.   The humeral head was then completely exposed.  The periarticular osteophytes were carefully removed with a rongeur.    The cutting guide for the head cut was inserted.  This was set to 20° of retroversion which I judged off of the forearm.  With the guide in position, I demarcated the cut and then carried out the cut using an oscillating saw.  The cut portion of the bone was removed and taken to the back table for possible bone grafting later in the case, if needed.    Having completed the humeral sided preparations, I then directed my attention to the glenoid.  The axillary nerve was carefully dissected out and identified.  This structure was protected.  Retractors were carefully positioned along the anterior, posterior and inferior glenoid rim.  I  carefully exposed the caudal rim of the glenoid using the electrocautery.  The anterior, inferior and posterior glenoid labrum were then carefully debrided and removed along with the remaining biceps stump superiorly.  The centering guide for the baseplate was inserted.  The center pin for the baseplate was drilled in the center of the glenoid vault.  I then carefully reamed and prepared the glenoid in typical fashion, taking care to maintain appropriate inferior tilt for proper positioning of the baseplate.  I determined that a small augment baseplate was necessary in order to allow for adequate inferior tilt.    Once I had completed the reaming process and made sure that the reaming was adequate, the small augment baseplate was impacted into position, carefully positioned to allow for superior placement of the augment.  This was secured with a single screw central compression screw which got great purchase.  The 4 peripheral locking screws were then placed without complication.  All 4 screws were confirmed to lock into the baseplate.  With the baseplate secured, I examined the remaining glenoid.  I did determine that a 36 standard glenosphere would fit best in this case.  The appropriate size implant was selected for use and then impacted.  I took care to make sure that the Soto taper was fully engaged and that the implant was well-seated.  I used a right angle clamp to pass this beneath the implant and pull up.  When doing so, the entire scapula moved.  Once I was satisfied that this was well seated, I then directed my attention back to the humerus.    The humeral sided preparations were carried out in the typical fashion.  I reamed and broached the humerus up to a size 7 micro which seemed to fit well.  The appropriate size implant was impacted into position, taking care to maintain appropriate retroversion as judged off of the forearm.  The implants seated well.  I then trialed off of the stem.  The standard  thickness, standard offset trial tray with a standard thickness 36 mm trial bearing seemed to fit best.  This allowed for excellent motion and stability.  The trial component was removed and then the final component impacted.  Again, I took care to make sure that the Soto taper was fully engaged before reducing it and carrying the shoulder through range of motion.    Again, the shoulder demonstrated excellent motion and stability.  I could fully elevate, abduct and externally rotate the shoulder without any impingement or limitation.  The shoulder demonstrated excellent motion and absolutely no instability.  There was good tension in the periarticular soft tissue structures.  At this point, I directed my attention to the subscapularis repair.  The arm was placed in approximately 30 degrees of external rotation.  The subscapularis was then anatomically repaired using multiple #2 max braid sutures.  I then irrigated the wound with 500 cc of a Betadine-containing saline solution.  I then irrigated with 3 L of sterile saline via pulsatile lavage.  I made sure that we had good hemostasis.  The wound was sequentially closed in a layered fashion.  Vicryl was used to repair the subcutaneous tissues.  A running subcuticular Monocryl stitch was used to close the skin followed by Steri-Strips.  Sterile dressings were applied.  The drapes were withdrawn.  The arm was placed in a sling.  The patient was awakened and taken to the recovery room in good condition.    Hari Ventura MD  10/14/21

## 2021-10-14 NOTE — THERAPY DISCHARGE NOTE
Acute Care - Occupational Therapy Discharge  [facility]    Patient Name: Mason Parada  : 1965    MRN: [id number]                              Today's Date: 10/14/2021       Admit Date: 10/14/2021    Visit Dx:     ICD-10-CM ICD-9-CM   1. Traumatic tear of right rotator cuff, unspecified tear extent, initial encounter  S46.011A 840.4   2. Traumatic tear of right rotator cuff, initial encounter  S46.011A 840.4     Patient Active Problem List   Diagnosis   • Essential hypertension   • Gastroesophageal reflux disease without esophagitis   • Seasonal allergies   • Ductal carcinoma in situ (DCIS) of left breast   • Status post mastectomy, left   • Family history of cervical cancer   • History of kidney stones   • Vitamin D deficiency   • Osteoarthritis of left hip   • Secondary hypertension   • Rotator cuff tear   • Traumatic tear of right rotator cuff     Past Medical History:   Diagnosis Date   • Allergic    • Anxiety    • Carpal tunnel syndrome of right wrist    • Chest pain radiating to jaw     STRESS TEST OK 2018, IN EPIC - D/T STRESS, PANIC    • GERD (gastroesophageal reflux disease)    • H/O Septicemia (CMS/HCC) 2017    H/O Kidney stones   • Headache    • Heart palpitations    • History of breast cancer 2019    LEFT   • History of kidney stones 2017   • History of panic attacks    • Hyperlipidemia    • Hypertension    • Injury of neck     Whiplash 25 years ago      Past Surgical History:   Procedure Laterality Date   • BREAST BIOPSY Left    • BREAST RECONSTRUCTION Left 3/5/2019    Procedure: left prepectorasl placement of tissue expander with alloderm;  Surgeon: Abdirashid Ding MD;  Location: [facility];  Service: Plastics   • BREAST TISSUE EXPANDER REMOVAL INSERTION OF IMPLANT Bilateral 2019    Procedure: LEFT REMOVAL OF TISSUE EXPANDER AND PLACEMENT OF IMPLANT RIGHT AUGMENTATION RIGHT MASTOPEXY FOR SYMMETRY;  Surgeon: Abdirashid Ding MD;  Location: [facility];  Service:  Plastics   •  SECTION  1994    x1   • EXTRACORPOREAL SHOCK WAVE LITHOTRIPSY (ESWL)  2017   • FAT GRAFTING Left 2019    Procedure: FAT GRAFTING;  Surgeon: Abdirashid Ding MD;  Location: [facility];  Service: Plastics   • KIDNEY STONE SURGERY     • MASTECTOMY      left   • MASTECTOMY W/ SENTINEL NODE BIOPSY Left 3/5/2019    Procedure: LEFT SKIN-SPARING MASTECTOMY WITH SENTINEL LYMPH NODE BIOPSY;  Surgeon: Vonnie Cat MD;  Location: [facility];  Service: General      General Information     Row Name 10/14/21 1505          OT Time and Intention    Document Type discharge evaluation/summary  -     Mode of Treatment individual therapy; occupational therapy  -     Row Name 10/14/21 1505          General Information    Patient Profile Reviewed yes  -     Prior Level of Function independent:; community mobility; gait; all household mobility; transfer; bed mobility; ADL's  -     Existing Precautions/Restrictions non-weight bearing; shoulder; right  -     Barriers to Rehab none identified  -     Row Name 10/14/21 1505          Living Environment    Lives With spouse; child(derrick), dependent  -     Row Name 10/14/21 1505          Cognition    Orientation Status (Cognition) oriented x 4  -           User Key  (r) = Recorded By, (t) = Taken By, (c) = Cosigned By    Initials Name Provider Type     Deloris Collins, OTR Occupational Therapist               Mobility/ADL's     Row Name 10/14/21 1505          Bed Mobility    Comment (Bed Mobility) NT pt nauseated, nsg aware and providing pt w zofran IV  -     Row Name 10/14/21 1505          Transfers    Comment (Transfers) NT pt nauseated  -     Row Name 10/14/21 1505          Activities of Daily Living    BADL Assessment/Intervention upper body dressing  -     Row Name 10/14/21 1505          Upper Body Dressing Assessment/Training    Comment (Upper Body Dressing) ed on UBD technique to dght and pt. pt nauseated so UBD  not completed yet. will get dressed before dc  -KP           User Key  (r) = Recorded By, (t) = Taken By, (c) = Cosigned By    Initials Name Provider Type    Deloris Ibarra OTR Occupational Therapist               Obj/Interventions     Row Name 10/14/21 1509          Sensory Interventions    Comment, Sensory Intervention still has the block in place. cannot feel entire arm yet  -KP     Row Name 10/14/21 1506          Balance    Comment, Balance NT pt nauseated  -     Row Name 10/14/21 1506          Therapeutic Exercise    Therapeutic Exercise --  ed on UE HEP  -KP           User Key  (r) = Recorded By, (t) = Taken By, (c) = Cosigned By    Initials Name Provider Type    Deloris Ibarra OTR Occupational Therapist               Goals/Plan     Row Name 10/14/21 1508          Problem Specific Goal 1 (OT)    Problem Specific Goal 1 (OT) ed on HEP and total shoulder protocol and precautions  -KP     Time Frame (Problem Specific Goal 1, OT) short term goal (STG); 1 day  -     Progress/Outcome (Problem Specific Goal 1, OT) goal met  -           User Key  (r) = Recorded By, (t) = Taken By, (c) = Cosigned By    Initials Name Provider Type    Deloris Ibarra OTR Occupational Therapist               Clinical Impression     Row Name 10/14/21 1501          Pain Assessment    Additional Documentation Pain Scale: Numbers Pre/Post-Treatment (Group)  -     Row Name 10/14/21 1502          Pain Scale: Numbers Pre/Post-Treatment    Pretreatment Pain Rating 0/10 - no pain  -     Posttreatment Pain Rating 0/10 - no pain  -     Row Name 10/14/21 1505          Plan of Care Review    Plan of Care Reviewed With patient; daughter  -     Progress no change  -     Outcome Summary pt admitted and is s/p pod 0 and is same day dc on OSC. pt was nauseated and could not complete tsf or UBD yet. Ed pt and her dght on UBD technique, shower technique, HEP w hand out, ed on precautions post sugery. Pt ed  on NWB and sling use. Pt will dc w HEP and out patient in a couple weeks.  -     Row Name 10/14/21 1507          Therapy Assessment/Plan (OT)    Therapy Frequency (OT) evaluation only  -     Row Name 10/14/21 1507          Therapy Plan Review/Discharge Plan (OT)    Anticipated Discharge Disposition (OT) home with assist; home with outpatient therapy services  -     Row Name 10/14/21 1503          Positioning and Restraints    Pre-Treatment Position in bed  -KP     Post Treatment Position bed  -KP     In Bed supine; call light within reach; encouraged to call for assist; with family/caregiver; with nsg  -           User Key  (r) = Recorded By, (t) = Taken By, (c) = Cosigned By    Initials Name Provider Type    Deloris Ibarra OTR Occupational Therapist               Outcome Measures     Row Name 10/14/21 1503          How much help from another is currently needed...    Putting on and taking off regular lower body clothing? 2  -KP     Bathing (including washing, rinsing, and drying) 2  -KP     Toileting (which includes using toilet bed pan or urinal) 3  -KP     Putting on and taking off regular upper body clothing 2  -KP     Taking care of personal grooming (such as brushing teeth) 3  -KP     Eating meals 3  -KP     AM-PAC 6 Clicks Score (OT) 15  -     Row Name 10/14/21 1502          Functional Assessment    Outcome Measure Options AM-PAC 6 Clicks Daily Activity (OT)  -           User Key  (r) = Recorded By, (t) = Taken By, (c) = Cosigned By    Initials Name Provider Type    Deloris Ibarra OTR Occupational Therapist              Occupational Therapy Education                 Title: PT OT SLP Therapies (Resolved)     Topic: Occupational Therapy (Resolved)     Point: ADL training (Resolved)     Description:   Instruct learner(s) on proper safety adaptation and remediation techniques during self care or transfers.   Instruct in proper use of assistive devices.              Learning  Progress Summary           Patient Acceptance, E,TB,D,H, DU,VU by  at 10/14/2021 1509    Comment: ed pt and family on role of OT. ed on HEP and protocol w NWB status. pt and dght understands protocol and HEP. pt very nauseated and unable to perform UBD now and block still in place. dc w HEP   Family Acceptance, E,TB,D,H, DU,VU by  at 10/14/2021 1509    Comment: ed pt and family on role of OT. ed on HEP and protocol w NWB status. pt and dght understands protocol and HEP. pt very nauseated and unable to perform UBD now and block still in place. dc w HEP                   Point: Home exercise program (Resolved)     Description:   Instruct learner(s) on appropriate technique for monitoring, assisting and/or progressing therapeutic exercises/activities.              Learning Progress Summary           Patient Acceptance, E,TB,D,H, DU,VU by  at 10/14/2021 1509    Comment: ed pt and family on role of OT. ed on HEP and protocol w NWB status. pt and dght understands protocol and HEP. pt very nauseated and unable to perform UBD now and block still in place. dc w HEP   Family Acceptance, E,TB,D,H, DU,VU by  at 10/14/2021 1509    Comment: ed pt and family on role of OT. ed on HEP and protocol w NWB status. pt and dght understands protocol and HEP. pt very nauseated and unable to perform UBD now and block still in place. dc w HEP                   Point: Precautions (Resolved)     Description:   Instruct learner(s) on prescribed precautions during self-care and functional transfers.              Learning Progress Summary           Patient Acceptance, E,TB,D,H, DU,VU by  at 10/14/2021 1509    Comment: ed pt and family on role of OT. ed on HEP and protocol w NWB status. pt and dght understands protocol and HEP. pt very nauseated and unable to perform UBD now and block still in place. dc w HEP   Family Acceptance, E,TB,D,H, DU,VU by  at 10/14/2021 1509    Comment: ed pt and family on role of OT. ed on HEP and protocol w  NWB status. pt and dght understands protocol and HEP. pt very nauseated and unable to perform UBD now and block still in place. dc w HEP                   Point: Body mechanics (Resolved)     Description:   Instruct learner(s) on proper positioning and spine alignment during self-care, functional mobility activities and/or exercises.              Learning Progress Summary           Patient Acceptance, E,TB,D,H, DU,VU by  at 10/14/2021 1509    Comment: ed pt and family on role of OT. ed on HEP and protocol w NWB status. pt and dght understands protocol and HEP. pt very nauseated and unable to perform UBD now and block still in place. dc w HEP   Family Acceptance, E,TB,D,H, DU,VU by  at 10/14/2021 1509    Comment: ed pt and family on role of OT. ed on HEP and protocol w NWB status. pt and dght understands protocol and HEP. pt very nauseated and unable to perform UBD now and block still in place. dc w HEP                               User Key     Initials Effective Dates Name Provider Type Discipline     06/16/21 -  Deloris Collins, OTR Occupational Therapist OT              OT Recommendation and Plan  Retired Outcome Summary/Treatment Plan (OT)  Anticipated Discharge Disposition (OT): home with assist, home with outpatient therapy services  Therapy Frequency (OT): evaluation only  Plan of Care Review  Plan of Care Reviewed With: patient, daughter  Progress: no change  Outcome Summary: pt admitted and is s/p pod 0 and is same day dc on OSC. pt was nauseated and could not complete tsf or UBD yet. Ed pt and her dght on UBD technique, shower technique, HEP w hand out, ed on precautions post sugery. Pt ed on NWB and sling use. Pt will dc w HEP and out patient in a couple weeks.  Plan of Care Reviewed With: patient, daughter  Outcome Summary: pt admitted and is s/p pod 0 and is same day dc on OSC. pt was nauseated and could not complete tsf or UBD yet. Ed pt and her dght on UBD technique, shower technique, HEP  w hand out, ed on precautions post sugery. Pt ed on NWB and sling use. Pt will dc w HEP and out patient in a couple weeks.     Time Calculation:    Time Calculation- OT     Row Name 10/14/21 1511             Time Calculation- OT    OT Start Time 1424  -KP      OT Stop Time 1432  -KP      OT Time Calculation (min) 8 min  -KP      OT Received On 10/14/21  -KP              Untimed Charges    OT Eval/Re-eval Minutes 8  -KP              Total Minutes    Untimed Charges Total Minutes 8  -KP       Total Minutes 8  -KP            User Key  (r) = Recorded By, (t) = Taken By, (c) = Cosigned By    Initials Name Provider Type    Deloris Ibarra OTR Occupational Therapist              Therapy Charges for Today     Code Description Service Date Service Provider Modifiers Qty    [id number] HC OT EVAL LOW COMPLEXITY 2 10/14/2021 Deloris Collins OTR GO 1               ZAFAR Duran  10/14/2021

## 2021-10-14 NOTE — OUTREACH NOTE
Prep Survey      Responses   [facility] facility patient discharged from? [place]   Is LACE score < 7 ? Yes   Emergency Room discharge w/ pulse ox? No   Eligibility [facility]   Date of Admission 10/14/21   Date of Discharge 10/14/21   Discharge Disposition Home or Self Care   Discharge diagnosis Reverse total shoulder arthro   Does the patient have one of the following disease processes/diagnoses(primary or secondary)? Total Joint Replacement   Does the patient have Home health ordered? No   Is there a DME ordered? No   Prep survey completed? Yes          Olivia Acosta RN

## 2021-10-14 NOTE — ANESTHESIA PREPROCEDURE EVALUATION
Anesthesia Evaluation     Patient summary reviewed and Nursing notes reviewed                Airway   Mallampati: II  TM distance: >3 FB  Neck ROM: full  Dental      Pulmonary - negative pulmonary ROS   Cardiovascular     ECG reviewed  Rhythm: regular  Rate: normal    (+) hypertension, hyperlipidemia,       Neuro/Psych  (+) headaches, numbness, psychiatric history Anxiety and Depression,     GI/Hepatic/Renal/Endo    (+)  GERD,  renal disease stones,     Musculoskeletal     Abdominal    Substance History - negative use     OB/GYN negative ob/gyn ROS         Other   arthritis,    history of cancer remission                    Anesthesia Plan    ASA 3     general with block   (Patient requests left arm to be used for IV today if accessible despite previous sentinel node removal previously. She was offered a central line or foot IV but prefers the left arm agreeing to the risk of lymphedema . Careful attention to sterile prep prior to IV attempts on the left arm.     Use of a 6.5 ETT by history    Right ISB with exparel PSR for POPC    I have reviewed the patient's history with the patient and the chart, including all pertinent laboratory results and imaging. I have explained the risks of anesthesia including but not limited to dental damage, corneal abrasion, nerve injury, MI, stroke, and death. Questions asked and answered. Anesthetic plan discussed with patient and team as indicated. Patient expressed understanding of the above.  )  intravenous induction     Anesthetic plan, all risks, benefits, and alternatives have been provided, discussed and informed consent has been obtained with: patient.

## 2021-10-14 NOTE — ANESTHESIA PROCEDURE NOTES
Peripheral Block    Pre-sedation assessment completed: 10/14/2021 9:27 AM    Patient reassessed immediately prior to procedure    Patient location during procedure: holding area  Start time: 10/14/2021 9:27 AM  Stop time: 10/14/2021 9:37 AM  Reason for block: at surgeon's request and post-op pain management  Performed by  Anesthesiologist: Kasi Kim MD  Preanesthetic Checklist  Completed: patient identified, IV checked, site marked, risks and benefits discussed, surgical consent, monitors and equipment checked, pre-op evaluation and timeout performed  Prep:  Pt Position: sitting  Sterile barriers:cap, gloves, gown, mask and sterile barriers  Prep: ChloraPrep  Patient monitoring: blood pressure monitoring, continuous pulse oximetry and EKG  Procedure  Sedation:yes  Performed under: local infiltration  Guidance:ultrasound guided  ULTRASOUND INTERPRETATION.  Using ultrasound guidance a 21 G gauge needle was placed in close proximity to the brachial plexus nerve, at which point, under ultrasound guidance anesthetic was injected in the area of the nerve and spread of the anesthesia was seen on ultrasound in close proximity thereto.  There were no abnormalities seen on ultrasound; a digital image was taken; and the patient tolerated the procedure with no complications. Images:still images obtained    Laterality:right  Block Type:interscalene  Injection Technique:single-shot  Needle Type:echogenic  Needle Gauge:21 G      Medications Used: bupivacaine liposome (EXPAREL) 1.3 % injection, 10 mL  bupivacaine PF (MARCAINE) 0.25 % injection, 30 mL  mepivacaine (CARBOCAINE) 1.5 % injection, 5 mL      Medications  Comment:Ultrasound Interpretation: Using ultrasound guidance, the needle was placed in close proximity to the target nerve and anesthetic was injected in the area of the target nerve and/or bundles, and spread of the anesthetic was seen on ultrasound in close proximity thereto.  There were no abnormalities  seen on ultrasound; a digital / physical image was taken; and the patient tolerated the procedure with no complications.   Block placed for postoperative pain control per surgeon request.    Post Assessment  Injection Assessment: negative aspiration for heme, no paresthesia on injection and incremental injection  Patient Tolerance:comfortable throughout block  Complications:no

## 2021-10-14 NOTE — ANESTHESIA POSTPROCEDURE EVALUATION
Patient: Mason Parada    Procedure Summary     Date: 10/14/21 Room / Location: [facility] OR  / [facility] MAIN OR    Anesthesia Start: 1029 Anesthesia Stop: 1222    Procedure: Reverse Total Shoulder Arthroplasty (Right Shoulder) Diagnosis:       Traumatic tear of right rotator cuff, unspecified tear extent, initial encounter      (Traumatic tear of right rotator cuff, unspecified tear extent, initial encounter [S46.011A])    Surgeons: Hari Ventura MD Provider: Kasi Kim MD    Anesthesia Type: general with block ASA Status: 3          Anesthesia Type: general with block    Vitals  Vitals Value Taken Time   /81 10/14/21 1301   Temp 36.4 °C (97.5 °F) 10/14/21 1220   Pulse 82 10/14/21 1312   Resp 16 10/14/21 1300   SpO2 98 % 10/14/21 1312   Vitals shown include unvalidated device data.        Post Anesthesia Care and Evaluation    Patient location during evaluation: PACU  Patient participation: complete - patient participated  Level of consciousness: awake and alert  Pain management: adequate  Airway patency: patent  Anesthetic complications: No anesthetic complications    Cardiovascular status: acceptable  Respiratory status: acceptable  Hydration status: acceptable    Comments: --------------------            10/14/21               1300     --------------------   BP:       141/81     Pulse:      68       Resp:       16       Temp:                SpO2:      99%      --------------------

## 2021-10-14 NOTE — PERIOPERATIVE NURSING NOTE
Dr Hinton & Dr Ventura ok'ed IV to be placed in left arm for surgery.  Dr Ventura aware of UA & culture on 10/6- ok'ed for OR.

## 2021-10-15 ENCOUNTER — TRANSITIONAL CARE MANAGEMENT TELEPHONE ENCOUNTER (OUTPATIENT)
Dept: CALL CENTER | Facility: HOSPITAL | Age: 56
End: 2021-10-15

## 2021-10-15 ENCOUNTER — TELEPHONE (OUTPATIENT)
Dept: ORTHOPEDIC SURGERY | Facility: CLINIC | Age: 56
End: 2021-10-15

## 2021-10-15 ENCOUNTER — APPOINTMENT (OUTPATIENT)
Dept: LAB | Facility: HOSPITAL | Age: 56
End: 2021-10-15

## 2021-10-15 NOTE — TELEPHONE ENCOUNTER
Postop follow-up call.  I spoke to Ms. Karma.  Reports the nerve block is still working.  I instructed her to postpone pendulum exercises until she has full sensation return to her arm.  We discussed other postop care instructions.  She verbalized understanding of all we discussed and appreciated the call.

## 2021-10-15 NOTE — OUTREACH NOTE
Call Center TCM Note      Responses   [facility] patient discharged from? [place]   Does the patient have one of the following disease processes/diagnoses(primary or secondary)? Total Joint Replacement   Joint surgery performed? Shoulder   TCM attempt successful? Yes   Call start time 1520   Call end time 1532   Discharge diagnosis Reverse total shoulder arthro   Person spoke with today (if not patient) and relationship Patient   Does the patient have all medications related to this admission filled (includes all antibiotics, pain medications, etc.) Yes   Is the patient taking all medications as directed (includes completed medication regime)? Yes   Is the patient able to teach back alternate methods of pain control? Ice,  Correct alignment,  Short, frequent activity   Does the patient have a follow up appointment with their surgeon? Yes   Has the patient kept scheduled appointments due by today? N/A   Comments [facility] hospital on 10.28/21 at 1:30 PM   Psychosocial issues? No   Has the patient began therapy sessions (either in the home or as an out patient)? No   Does the patient have a wound vac in place? N/A   Has the patient fallen since discharge? No   Did the patient receive a copy of their discharge instructions? Yes   Nursing interventions Reviewed instructions with patient   What is the patient's perception of their functional status since discharge? Improving   Is the patient able to teach back signs and symptoms of infection? Shortness of breath or chest pain,  Temp >100.4 for 24h or longer,  Incisional drainage,  Blisters around incision,  Increased swelling or redness around incision (not associated with surgical edema),  Severe discomfort or pain   Is the patient able to teach back how to prevent infection? Wash hands before and after touching incision,  Check incision daily,  No lotion or creams,  No tub baths, hot tub or swimming,  Shower only as directed by surgeon   If the patient is a current  smoker, are they able to teach back resources for cessation? Not a smoker   Is the patient/caregiver able to teach back the hierarchy of who to call/visit for symptoms/problems? PCP, Specialist, Home health nurse, Urgent Care, ED, 911 Yes   TCM call completed? Yes   Wrap up additional comments Pt states she is doing better from rotator cuff surgery. States she is having redness/itching all over, and feels it is a reaction from vancomycin, or percocoet. Pt inquired about taking benadryl,  RN called PCP office and spoke with DANIEL Nunez, and was given orders to have pt take benadryl for redness/itching. RN called pt back to inform pt that DANIEL Nunez gave orders for her to take benadryl, and if redness/itchiness progresses go to ED. Pt verbalized understanding. Pt verified PCP hospital fu appt on 10/28/21.          Melanie Spaulding RN    10/15/2021, 15:49 EDT

## 2021-10-15 NOTE — TELEPHONE ENCOUNTER
I spoke to Ms. Parada.  We discussed ways to manage her symptom of itching.  We discussed possibly changing her pain medication, but she does not want to do that at this time.  She will try to space out the pain medication and see if that helps.  She will touch base with me next week if she needs further assistance.

## 2021-10-15 NOTE — OUTREACH NOTE
Call Center TCM Note      Responses   [facility] patient discharged from? [place]   Does the patient have one of the following disease processes/diagnoses(primary or secondary)? Total Joint Replacement   Joint surgery performed? Shoulder  [Right]   TCM attempt successful? No   Unsuccessful attempts Attempt 1          Melanie Spaulding RN    10/15/2021, 12:51 EDT

## 2021-10-15 NOTE — TELEPHONE ENCOUNTER
Provider: AMELIA GEE  Caller: ELIZABETH FERNANDES  Relationship to Patient: SELF  Pharmacy:   Phone Number: [phone]    Reason for Call: PATIENT HAD SHOULDER SURGERY ON 10/14/2021- STATES SHE HAS SEVERE ITCHING- FLUSHED FEELING ON SKIN- ITCHING HAS INCREASED- WANTS TO KNOW WHAT SHE CAN DO OR WHAT IS AVAILABLE TO RELIEVE ITCHING- SPOKE WITH AMELIA GEE A SHORT WHILE AGO TODAY10/15/2021  When was the patient last seen: 10/14/2021

## 2021-10-18 ENCOUNTER — TELEPHONE (OUTPATIENT)
Dept: ORTHOPEDIC SURGERY | Facility: HOSPITAL | Age: 56
End: 2021-10-18

## 2021-10-18 NOTE — TELEPHONE ENCOUNTER
Post op day 4  Discharge Instructions:  Ask patient about his or her discharge instructions  ?  Patient confirmed understanding   ?  Further instruction needed   What, if any, recommendations, teaching, or interventions did you provide? Click or tap here to enter text.  Health status:  Pain controlled Yes   She is taking Motrin for the most part and taking the Percocet when she is having severe pain.    Recommended interventions:  No  Did tell her to be careful with the Motrin as it is an anti-inflammatory and can increase bleeding risk.   Incision/dressing status   ?  Clean without redness, drainage, odor  ?  Redness    ?  Drainage - color Click or tap here to enter text.  ?  Odor  DELFINO - Green light blinking Choose an item.  Difficulties urination No  Last BM 10/17/2021 (if no BM by day 3-recommend OTC suppository or fleets enema)  Still taking the Colace, but has had 1 good BM since surgery.   Medications:  ?Medications reviewed with patient/family/caregiver  Patient taking medications as prescribed?   Yes  If not taking medications as prescribed, note specific medicine(s) and reason for each:  Click or tap here to enter text.  Hospital Follow Up Plan:  Follow up Appointment with Orthopedic surgeon:  ?Has f/u appointment                ?Scheduled f/u appointment  Home Care ordered at discharge?    No        Home Care started, or contact made?    No   If no, action taken: She is a total Shoulder. MD to order PT.   DME obtained/used in home?         No   Other information:  She states she is doing ok. The block wore off on Saturday, and she has had some increased pain. The pain medication is making her red and itchy. She is taking Benadryl with the pain pill. She took a Shower yesterday. Some of the swelling has started to subside. The sling remains in place. She has no questions for me at this time. My Contact information was given should she need anything. She voiced understanding.

## 2021-10-19 ENCOUNTER — TELEPHONE (OUTPATIENT)
Dept: FAMILY MEDICINE CLINIC | Facility: CLINIC | Age: 56
End: 2021-10-19

## 2021-10-19 RX ORDER — OXYCODONE AND ACETAMINOPHEN 7.5; 325 MG/1; MG/1
1 TABLET ORAL EVERY 4 HOURS PRN
Qty: 50 TABLET | Refills: 0 | Status: SHIPPED | OUTPATIENT
Start: 2021-10-19 | End: 2021-10-20

## 2021-10-19 NOTE — TELEPHONE ENCOUNTER
She needs to call Ortho that followed up yesterday. There are 2 notes that they offered to change prescription.

## 2021-10-19 NOTE — TELEPHONE ENCOUNTER
Caller: Mason Parada    Relationship: Self    Best call back number: [phone]    What medication are you requesting: PATIENT WOULD LIKE TO HAVE SOMETHING TO RELIEVE SYMPTOMS, PAIN MEDICATION POST SURGERY.    What are your current symptoms: ALLERGIC REACTION TO PAIN MEDICATION (OXYCODONE), MOUTH IS DRY AND BURNING AND COATED IN WHITE, SKIN HOT AND FLUSHED, SKIN ITCHING, NO VISIBLE RASH    How long have you been experiencing symptoms: SINCE FRIDAY    Have you had these symptoms before:    [] Yes  [x] No    Have you been treated for these symptoms before:   [] Yes  [x] No    If a prescription is needed, what is your preferred pharmacy and phone number: [facility] [address] - [phone] [facility] [phone]      Additional notes:  PATIENT HAS BEEN TAKING 800 MG OF IBUPROFEN AND BENADRYL BUT HER SYMPTOMS ARE NOT IMPROVING SINCE STOPPING THE OXYCODONE.  PATIENT STOPPED THE OXYCODONE YESTERDAY MORNING.    PATIENTS NERVE BLOCK WORE OFF AND SHE NOTICED THE SYMPTOMS MORE SINCE Saturday AFTER THE BLOCK WORE COMPLETELY  OFF.      PATIENT STATED THAT THE IBUPROFEN IS SLIGHTLY HELPING BUT NOT ENOUGH FOR THE PAIN.   PATIENT ICING AROUND THE CLOCK.    PATIENT IS ALLERGIC TO SOMETHING IN BOTH OXYCODONE AND HYDROCODONE.

## 2021-10-19 NOTE — TELEPHONE ENCOUNTER
Patient states she is having a reaction to the hydrocodone.  Patient states her face is red and flushed, she has burning and itching.  She has been taking benadryl since Friday but not helping.  She stopped taking her hydrocodone about 9:00 yesterday.  Patient is tearful.  She tried to call her PCP but she is on vacation this week.  Patient is only on ibuprofen at this time.  [facility] [place] if you want to call something in.   Please call patient.

## 2021-10-20 ENCOUNTER — OFFICE VISIT (OUTPATIENT)
Dept: FAMILY MEDICINE CLINIC | Facility: CLINIC | Age: 56
End: 2021-10-20

## 2021-10-20 ENCOUNTER — PATIENT MESSAGE (OUTPATIENT)
Dept: ORTHOPEDIC SURGERY | Facility: CLINIC | Age: 56
End: 2021-10-20

## 2021-10-20 VITALS
SYSTOLIC BLOOD PRESSURE: 173 MMHG | TEMPERATURE: 98.8 F | HEART RATE: 99 BPM | OXYGEN SATURATION: 98 % | HEIGHT: 59 IN | DIASTOLIC BLOOD PRESSURE: 72 MMHG | BODY MASS INDEX: 29.43 KG/M2 | RESPIRATION RATE: 16 BRPM | WEIGHT: 146 LBS

## 2021-10-20 DIAGNOSIS — B37.2 CANDIDIASIS OF SKIN: ICD-10-CM

## 2021-10-20 DIAGNOSIS — S46.011D TRAUMATIC TEAR OF RIGHT ROTATOR CUFF, UNSPECIFIED TEAR EXTENT, SUBSEQUENT ENCOUNTER: Primary | ICD-10-CM

## 2021-10-20 DIAGNOSIS — B37.0 CANDIDIASIS OF MOUTH: Primary | ICD-10-CM

## 2021-10-20 PROCEDURE — 99213 OFFICE O/P EST LOW 20 MIN: CPT | Performed by: NURSE PRACTITIONER

## 2021-10-20 RX ORDER — NYSTATIN 100000 [USP'U]/G
POWDER TOPICAL 2 TIMES DAILY
Qty: 1 EACH | Refills: 0 | Status: SHIPPED | OUTPATIENT
Start: 2021-10-20 | End: 2021-10-25

## 2021-10-20 RX ORDER — TRAMADOL HYDROCHLORIDE 50 MG/1
TABLET ORAL
Qty: 42 TABLET | Refills: 0 | Status: SHIPPED | OUTPATIENT
Start: 2021-10-20 | End: 2021-11-22

## 2021-10-20 NOTE — TELEPHONE ENCOUNTER
Spoke with patient's daughter about her pain.  She reports that she has a known allergy to hydrocodone so has been unable to take that.  She was given Percocet but she reports that it is causing her to itch in her mouth to burn.  She has been taking ibuprofen 800 mg every 8 hours with minimal relief.  She was taken some Tylenol but she thought it made her feel funny so she discontinued it.  We will try some tramadol and see if that helps, she has tolerated it well in the past.  Encouraged her to continue ice, rest, elevation, etc.  She will call back with any further concerns.

## 2021-10-20 NOTE — PROGRESS NOTES
Chief Complaint  Oral Pain and Allergic reaction to Pain med    Subjective          Mason presents to [facility]    Review of Systems   Constitutional: Negative.    HENT: Positive for mouth sores.    Eyes: Negative.    Respiratory: Negative.    Cardiovascular: Negative.    Gastrointestinal: Negative.    Endocrine: Negative.    Genitourinary: Negative.    Musculoskeletal: Negative.    Skin: Positive for rash.   Allergic/Immunologic: Negative.    Neurological: Negative.    Hematological: Negative.    Psychiatric/Behavioral: Negative.    56-year-old female presents to the office today post shoulder surgery.  She states she was having a reaction to the oxycodone that she was prescribed and was having some itching and burning in her mouth.. States she has DC'd taking that medication for the past week and has only been taking 800 mg ibuprofen every 8 hours with minimal pain relief.  After speaking to Ortho again today they did order her some tramadol to take nightly for sleep.  Her main complaint is not being able to rest at all.      She has continued to wear her brace and attempt to do her exercises daily as much as the pain will let her complete the task.  She is continuing to use ice regularly.      She is having a white coating on her mouth and underneath her right arm where she has been wearing the brace that is itchy and irritated.  She has not tried any cream or medication to the area.    She has an active problem list of the following  Patient Active Problem List   Diagnosis   • Essential hypertension   • Gastroesophageal reflux disease without esophagitis   • Seasonal allergies   • Ductal carcinoma in situ (DCIS) of left breast   • Status post mastectomy, left   • Family history of cervical cancer   • History of kidney stones   • Vitamin D deficiency   • Osteoarthritis of left hip   • Secondary hypertension   • Rotator cuff tear   • Traumatic tear of right rotator cuff       She  has been compliant on the following medications  Current Outpatient Medications on File Prior to Visit   Medication Sig Dispense Refill   • Cholecalciferol (VITAMIN D) 1000 units tablet Take 1,000 Units by mouth Daily. HOLDING FOR SURGERY     • docusate sodium (COLACE) 100 MG capsule Take 1 capsule by mouth 2 (Two) Times a Day. 60 capsule 0   • famotidine (PEPCID) 40 MG tablet Take 40 mg by mouth Every Morning.     • hydrOXYzine (ATARAX) 25 MG tablet Take 1 tablet by mouth Every 8 (Eight) Hours As Needed for Itching. 60 tablet 10   • ibuprofen (ADVIL,MOTRIN) 800 MG tablet Take 1 tablet by mouth Every 8 (Eight) Hours As Needed for Mild Pain . 90 tablet 1   • ketotifen (ZADITOR) 0.025 % ophthalmic solution Administer 1 drop to both eyes 2 (Two) Times a Day. 1 each 5   • lisinopril-hydrochlorothiazide (PRINZIDE,ZESTORETIC) 20-25 MG per tablet Take 1 tablet by mouth Daily. 90 tablet 3   • mupirocin (BACTROBAN) 2 % ointment Apply 1 application topically to the appropriate area as directed. AS DIRECTED FOR PREOP     • ondansetron (Zofran) 4 MG tablet Take 1 tablet by mouth Every 8 (Eight) Hours As Needed for Nausea or Vomiting. 30 tablet 0   • tamoxifen (NOLVADEX) 20 MG chemo tablet Take 1 tablet by mouth Daily. 90 tablet 2   • oxyCODONE-acetaminophen (PERCOCET) 7.5-325 MG per tablet Take 1 tablet by mouth Every 4 (Four) Hours As Needed for Moderate Pain . 50 tablet 0     Current Facility-Administered Medications on File Prior to Visit   Medication Dose Route Frequency Provider Last Rate Last Admin   • Chlorhexidine Gluconate Cloth 2 % pads 1 each  1 each Apply externally Take As Directed Hari Ventura MD           She denies any medication side effects other than the previous dated Percocet reaction    All chronic conditions are stable without issues    The following portions of the patient's history were reviewed and updated as appropriate: allergies, current medications, past family history, past medical history,  "past social history, past surgical history, and problem list         Objective   Vital Signs:   Vitals:    10/20/21 1342   BP: 173/72   Pulse: 99   Resp: 16   Temp: 98.8 °F (37.1 °C)   SpO2: 98%   Weight: 66.2 kg (146 lb)   Height: 149.9 cm (59\")        Physical Exam  Constitutional:       General: She is not in acute distress.     Appearance: Normal appearance. She is normal weight. She is not ill-appearing.   HENT:      Head: Normocephalic.      Nose: Nose normal. No congestion.      Mouth/Throat:      Lips: Pink.      Mouth: Mucous membranes are moist. Oral lesions present.        Comments: Some redness noted to the tongue and pharynx.  Slight white coating  Eyes:      Pupils: Pupils are equal, round, and reactive to light.   Cardiovascular:      Rate and Rhythm: Normal rate and regular rhythm.      Pulses: Normal pulses.      Heart sounds: Normal heart sounds. No murmur heard.      Pulmonary:      Effort: Pulmonary effort is normal.      Breath sounds: Normal breath sounds.   Abdominal:      General: Abdomen is flat.      Palpations: Abdomen is soft.   Musculoskeletal:      Cervical back: Normal range of motion and neck supple.   Skin:     General: Skin is warm and dry.      Capillary Refill: Capillary refill takes less than 2 seconds.      Findings: No rash.   Neurological:      Mental Status: She is alert and oriented to person, place, and time.   Psychiatric:         Mood and Affect: Mood normal.         Behavior: Behavior normal.         Thought Content: Thought content normal.         Judgment: Judgment normal.          Result Review :     {The following data was reviewed by       COVID PRE-OP / PRE-PROCEDURE SCREENING ORDER (NO ISOLATION) - Swab, Nasopharynx (10/12/2021 13:36)  Urine Culture - Urine, Urine, Random Void (10/06/2021 11:16)  Urinalysis, Microscopic Only - Urine, Random Void (10/06/2021 11:16)  Urinalysis With Culture If Indicated - Urine, Random Void (10/06/2021 11:16)  CBC (No Diff) " (10/06/2021 11:16)  Basic Metabolic Panel (10/06/2021 11:15)      XR Shoulder 2+ View Right (10/14/2021 12:53)  Assessment and Plan    Diagnoses and all orders for this visit:    1. Candidiasis of mouth (Primary)  -     nystatin (MYCOSTATIN) 100,000 unit/mL suspension; Swish and spit 5 mL 4 (Four) Times a Day for 3 days.  Dispense: 60 mL; Refill: 0    2. Candidiasis of skin  -     nystatin (MYCOSTATIN) 260425 UNIT/GM powder; Apply  topically to the appropriate area as directed 2 (Two) Times a Day for 5 days.  Dispense: 1 each; Refill: 0    Plan  Medication ordered take as prescribed monitor for side effects  Keep axillary dry  Continue RICE  Continue exercises prescribed as tolerated  Keep follow-up scheduled with Ashlee next week        Follow Up   Return in about 1 month (around 11/20/2021).  Patient was given instructions and counseling regarding her condition or for health maintenance advice. Please see specific information pulled into the AVS if appropriate.

## 2021-10-20 NOTE — TELEPHONE ENCOUNTER
Is allergic to Hydrocodone, Oxycodone is causing itching, Trush.  [facility] is out of the office this afternoon.

## 2021-10-27 ENCOUNTER — OFFICE VISIT (OUTPATIENT)
Dept: ORTHOPEDIC SURGERY | Facility: CLINIC | Age: 56
End: 2021-10-27

## 2021-10-27 VITALS — BODY MASS INDEX: 30.24 KG/M2 | HEIGHT: 59 IN | WEIGHT: 150 LBS | TEMPERATURE: 98.4 F

## 2021-10-27 DIAGNOSIS — Z96.611 STATUS POST REPLACEMENT OF RIGHT SHOULDER JOINT: ICD-10-CM

## 2021-10-27 DIAGNOSIS — S46.011D TRAUMATIC TEAR OF RIGHT ROTATOR CUFF, UNSPECIFIED TEAR EXTENT, SUBSEQUENT ENCOUNTER: Primary | ICD-10-CM

## 2021-10-27 PROCEDURE — 99024 POSTOP FOLLOW-UP VISIT: CPT | Performed by: ORTHOPAEDIC SURGERY

## 2021-10-27 PROCEDURE — 73030 X-RAY EXAM OF SHOULDER: CPT | Performed by: ORTHOPAEDIC SURGERY

## 2021-10-27 RX ORDER — TRAMADOL HYDROCHLORIDE 50 MG/1
50 TABLET ORAL EVERY 4 HOURS PRN
Qty: 60 TABLET | Refills: 0 | Status: SHIPPED | OUTPATIENT
Start: 2021-10-27 | End: 2021-11-10 | Stop reason: SDUPTHER

## 2021-10-27 NOTE — PROGRESS NOTES
"Mason Parada : 1965 MRN: [id number] DATE: 10/27/2021    DIAGNOSIS:  2 week follow up right shoulder arthroplasty      SUBJECTIVE:  Patient returns today for 2 week follow up of right shoulder replacement. Patient reports doing well with no unusual complaints.      OBJECTIVE:    Temp 98.4 °F (36.9 °C)   Ht 149.9 cm (59.02\")   Wt 68 kg (150 lb)   LMP  (LMP Unknown)   BMI 30.28 kg/m²     Exam:  The incision is well approximated.  No erythema or drainage. Shoulder moves fluidly with pendulums.  The arm is soft and nontender.  Intact motor and sensory function in the lower arm and hand.  Palpable radial pulse.    DIAGNOSTIC STUDIES    Xrays: AP and scapular Y views of the right shoulder are ordered and reviewed for evaluation of the recent shoulder replacement.  The x-rays demonstrate a well positioned, well aligned replacement without complicating factors noted.  In comparison with previous films, there has been no change.    ASSESSMENT: 2 week follow up right shoulder replacement.    PLAN:   1.  Begin PT per protocol--prescription given along with 2 copies of my protocol.  2.  Continue sling until next visit.  3.  Counseled patient about appropriate activity modifications and restrictions, including no driving at this point.  4.  Rx for Ultram given.  Risks were discussed    Hari Ventura MD    "

## 2021-10-28 ENCOUNTER — OFFICE VISIT (OUTPATIENT)
Dept: FAMILY MEDICINE CLINIC | Facility: CLINIC | Age: 56
End: 2021-10-28

## 2021-10-28 VITALS
WEIGHT: 150 LBS | DIASTOLIC BLOOD PRESSURE: 74 MMHG | BODY MASS INDEX: 30.24 KG/M2 | HEIGHT: 59 IN | SYSTOLIC BLOOD PRESSURE: 120 MMHG | TEMPERATURE: 97.2 F | RESPIRATION RATE: 16 BRPM | HEART RATE: 84 BPM | OXYGEN SATURATION: 97 %

## 2021-10-28 DIAGNOSIS — M75.121 NONTRAUMATIC COMPLETE TEAR OF RIGHT ROTATOR CUFF: ICD-10-CM

## 2021-10-28 DIAGNOSIS — Z98.890 S/P ROTATOR CUFF REPAIR: Primary | ICD-10-CM

## 2021-10-28 DIAGNOSIS — Z09 HOSPITAL DISCHARGE FOLLOW-UP: ICD-10-CM

## 2021-10-28 PROCEDURE — 99214 OFFICE O/P EST MOD 30 MIN: CPT | Performed by: NURSE PRACTITIONER

## 2021-10-28 RX ORDER — IBUPROFEN 800 MG/1
800 TABLET ORAL EVERY 8 HOURS PRN
Qty: 90 TABLET | Refills: 1 | Status: SHIPPED | OUTPATIENT
Start: 2021-10-28 | End: 2022-02-07

## 2021-10-28 NOTE — PROGRESS NOTES
Subjective   Mason Parada is a 56 y.o. female.     History of Present Illness   Mason Parada 56 y.o. female presents today for hosptial follow up.  she was treated [facility] for right .  I reviewed all of the labs and diagnostic testing.  The patient's medications were not changed:  Current outpatient and discharge medications have been reconciled for the patient.  Reviewed by: LANE Ho    she does have a follow up appointment with a specialist:     She reports pain was not well controlled the week after her surgery due to reaction to Percocet.  She has since been changed to tramadol and is still using ibuprofen which seems to be helping to control pain.  She will be starting physical therapy tomorrow.    She is wearing sling as instructed and has already had her f/u appt with Dr. Ventura.    The following portions of the patient's history were reviewed and updated as appropriate: allergies, current medications, past family history, past medical history, past social history, past surgical history and problem list.    Review of Systems   Constitutional: Negative for unexpected weight change.   Respiratory: Negative for shortness of breath.    Cardiovascular: Negative for chest pain and palpitations.   Musculoskeletal: Positive for arthralgias and joint swelling.   Neurological: Negative for numbness.   Psychiatric/Behavioral: Negative for behavioral problems.       Objective   Physical Exam  Vitals and nursing note reviewed.   Constitutional:       Appearance: Normal appearance. She is well-developed.   Cardiovascular:      Rate and Rhythm: Normal rate.   Pulmonary:      Effort: Pulmonary effort is normal.   Neurological:      Mental Status: She is alert and oriented to person, place, and time.   Psychiatric:         Mood and Affect: Mood normal.         Behavior: Behavior normal.         Thought Content: Thought content normal.         Judgment: Judgment normal.         Assessment/Plan    Diagnoses and all orders for this visit:    1. S/P rotator cuff repair (Primary)    2. Nontraumatic complete tear of right rotator cuff  -     ibuprofen (ADVIL,MOTRIN) 800 MG tablet; Take 1 tablet by mouth Every 8 (Eight) Hours As Needed for Mild Pain .  Dispense: 90 tablet; Refill: 1    3. Hospital discharge follow-up      Hospital records reviewed with pt confirming HPI.

## 2021-10-29 ENCOUNTER — TREATMENT (OUTPATIENT)
Dept: PHYSICAL THERAPY | Facility: CLINIC | Age: 56
End: 2021-10-29

## 2021-10-29 DIAGNOSIS — M25.511 ACUTE PAIN OF RIGHT SHOULDER: ICD-10-CM

## 2021-10-29 DIAGNOSIS — M25.611 STIFFNESS OF RIGHT SHOULDER JOINT: ICD-10-CM

## 2021-10-29 DIAGNOSIS — Z47.89 ORTHOPEDIC AFTERCARE: ICD-10-CM

## 2021-10-29 DIAGNOSIS — Z96.611 STATUS POST REVERSE TOTAL REPLACEMENT OF RIGHT SHOULDER: Primary | ICD-10-CM

## 2021-10-29 PROCEDURE — 97161 PT EVAL LOW COMPLEX 20 MIN: CPT | Performed by: PHYSICAL THERAPIST

## 2021-10-29 PROCEDURE — 97140 MANUAL THERAPY 1/> REGIONS: CPT | Performed by: PHYSICAL THERAPIST

## 2021-10-29 PROCEDURE — 97110 THERAPEUTIC EXERCISES: CPT | Performed by: PHYSICAL THERAPIST

## 2021-10-29 PROCEDURE — 97530 THERAPEUTIC ACTIVITIES: CPT | Performed by: PHYSICAL THERAPIST

## 2021-10-29 NOTE — PROGRESS NOTES
Physical Therapy Initial Evaluation and Plan of Care      Patient: Mason Parada   : 1965  Diagnosis/ICD-10 Code:  Status post reverse total replacement of right shoulder [Z96.611]  Referring practitioner: Hari Ventura MD  Date of Initial Visit: 10/29/2021  Today's Date: 10/29/2021  Patient seen for 1 sessions           Subjective Evaluation    History of Present Illness  Onset date: 2021.  Date of surgery: 10/14/2021  Mechanism of injury: Pt known to this clinic for cuff tear rehab after lifting strain in January. States she was lifting her nursing bag and felt a pop with intense pain. Able to work but could never lift her arm well. 4 sessions of therapy with no progress in April-May then with 21 MRI showing significant cuff tear. Delayed surgery due to new insurance plan but eventually able to have reverse TSA vs. Cuff repair. Prior to surgery, continued to work and use her L UE for any lifting. Now off for 6 weeks and hopes to RTW if released by ortho. 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  Out pt surgery went well but unable to take meds with allergic reaction. Ice machine used at home. Standard sling with no abd pillow.   TSA done with subscap repair and bicep tenodesis. Pt able to do pendulum ex and elbow, wrist, hand ROM although found pendulums to be very painful the first week.   PMH L breast cancer 2019 with mastectomy and is doing well with no lymphedema, pain, or weakness of L UE.     Subjective comment: moderate pain and difficulty as she couldn't take oxycodone but okay with tramadol and ibuprofen  Patient Occupation: RN   Precautions and Work Restrictions: reverse TSA protocol the next 3 months. RTW?Quality of life: excellent    Pain  Current pain ratin  At best pain rating: 3  At worst pain rating: 10  Location: deep in shoulder  Quality: dull ache, sharp, radiating, throbbing, pressure and discomfort  Relieving factors: change in  position, ice, medications and support  Aggravating factors: sleeping  Progression: improved    Social Support  Lives in: multiple-level home  Lives with: spouse    Hand dominance: right    Diagnostic Tests  Abnormal x-ray: good post op x-rays     Treatments  Current treatment: immobilization  Patient Goals  Patient goals for therapy: decreased pain, decreased edema, increased strength, independence with ADLs/IADLs, return to sport/leisure activities and return to work             Objective          Static Posture     Shoulders  Rounded.    Observations     Right Shoulder  Positive for edema and incision.     Additional Shoulder Observation Details  Normal post op swelling. Mild bruising. Steri-strips in place    Neurological Testing     Sensation     Shoulder   Left Shoulder   Intact: light touch    Right Shoulder   Intact: light touch    Active Range of Motion   Left Shoulder   Normal active range of motion    Passive Range of Motion   Left Shoulder   Normal passive range of motion  Flexion: 180 degrees   Abduction: 165 degrees   External rotation 90°: 90 degrees   Internal rotation 90°: 85 degrees     Right Shoulder   Flexion: 90 degrees   External rotation 0°: -30 (-30 from neutral) degrees   Internal rotation 0°: 70 (to stomach) degrees     Scapular Mobility   Left Shoulder   Scapular mobility: WFL    Right Shoulder   Scapular mobility: fair    Strength/Myotome Testing     Left Shoulder   Normal muscle strength    Right Shoulder     Isolated Muscles   Biceps: 3+   Upper trapezius: 3+         See Exercise, Manual, and Modality Logs for complete treatment.     Functional outcome score: Q DASH 72%      Assessment & Plan     Assessment  Impairments: abnormal or restricted ROM, activity intolerance, impaired physical strength, lacks appropriate home exercise program, pain with function, safety issue and weight-bearing intolerance  Assessment details: Pt with stable yet evolving condition just 2 weeks post reverse  TSA. Normal restricted ROM at this point. Good flexion to 90 degrees meeting first goal of protocol. Pt given protocol for her own records and spent time discussing precautions and especially meaning of passive vs. Active ROM.   No other significant co-morbidities discovered today. Personal factor of having time to recover and can return to a job with minimal lifting requirements when ready.   Prognosis: good  Prognosis details: Access Code: [email or web address]  URL: [email or web address]  Date: 10/29/2021  Prepared by: Zorre Kimura    Exercises  Seated Shoulder External Rotation AAROM with Cane and Hand in Neutral - 3 x daily - 7 x weekly - 10 reps - arm on pillow fully support. only go to straight 12 o'clock for now. hold 10 sec. breathe hold  Seated Shoulder Shrug Circles AROM Backward - 3 x daily - 7 x weekly - 10 reps - keep hands on lap hold  Seated Shoulder Cradle Shrug - 3 x daily - 7 x weekly - 10 reps  Gentle Levator Scapulae Stretch - 3 x daily - 7 x weekly - 3 reps - 15s hold  Circular Shoulder Pendulum with Table Support - 5 x daily - 7 x weekly - 10 reps  Seated Shoulder Pendulum Exercise - 5 x daily - 7 x weekly - 10 reps    Functional Limitations: carrying objects, lifting, sleeping, walking, pulling, pushing, uncomfortable because of pain, moving in bed, reaching behind back, reaching overhead and unable to perform repetitive tasks  Goals  Plan Goals: STGs 6 weeks:  1. Gain PROM to 140 scaption and 45 ER and 45 IR per protocol  2. Tolerate AAROM per protocol  3. Sleep with minimal pain lying on shoulder if tolerated  4. Return to light duty work per MD  5. Q DASH to improve from 72 to < 45%  6. Follow precautions regarding lifting 1# and no excessive ER, IR or sh extension  LTGs 12 weeks:  1. PROM to 90% with gentle end range stretching   2. Lifting 3# OH with no issue  3. AROM to 140 flexion with no scapular hiking  4. Follow precautions and proceed to indep [facility] for more advance strength work indep  5.  Q DASH score < 25%    Plan  Therapy options: will be seen for skilled physical therapy services  Planned modality interventions: cryotherapy, electrical stimulation/Russian stimulation and thermotherapy (hydrocollator packs)  Planned therapy interventions: manual therapy, soft tissue mobilization, stretching, strengthening, therapeutic activities, home exercise program, functional ROM exercises and joint mobilization  Frequency: 2x week  Duration in visits: 20  Duration in weeks: 12  Treatment plan discussed with: patient         Timed:  Manual Therapy:    8     mins  14747;  Therapeutic Exercise:    15     mins  96941;     Neuromuscular Alesia:        mins  64660;    Therapeutic Activity:     15     mins  87733;     Gait Training:           mins  91972;     Ultrasound:          mins  85172;    Electrical Stimulation:         mins  73284 ( );  Iontophoresis         mins 03555;  Orthotic Mgmt/training       mins 30273;  Orthotic check out       mins 28833;  Canalith Repositioning       mins 93225;    Untimed:  Electrical Stimulation:         mins  38949 ( );  Mechanical Traction:         mins  45188;     Timed Treatment:   38   mins   Total Treatment:     60   mins    PT SIGNATURE: Zorre Zeno Kimura, ARI   DATE TREATMENT INITIATED: 10/29/2021    Initial Certification  Certification Period: 1/27/2022  I certify that the therapy services are furnished while this patient is under my care.  The services outlined above are required by this patient, and will be reviewed every 90 days.     PHYSICIAN: Hari Ventura MD      DATE:     Please sign and return via fax to [phone] vs.  [phone].. Thank you, [facility].

## 2021-11-03 ENCOUNTER — TREATMENT (OUTPATIENT)
Dept: PHYSICAL THERAPY | Facility: CLINIC | Age: 56
End: 2021-11-03

## 2021-11-03 DIAGNOSIS — M25.611 STIFFNESS OF RIGHT SHOULDER JOINT: ICD-10-CM

## 2021-11-03 DIAGNOSIS — Z47.89 ORTHOPEDIC AFTERCARE: ICD-10-CM

## 2021-11-03 DIAGNOSIS — M25.511 ACUTE PAIN OF RIGHT SHOULDER: ICD-10-CM

## 2021-11-03 DIAGNOSIS — Z96.611 STATUS POST REVERSE TOTAL REPLACEMENT OF RIGHT SHOULDER: Primary | ICD-10-CM

## 2021-11-03 PROCEDURE — 97110 THERAPEUTIC EXERCISES: CPT | Performed by: PHYSICAL THERAPIST

## 2021-11-03 PROCEDURE — 97140 MANUAL THERAPY 1/> REGIONS: CPT | Performed by: PHYSICAL THERAPIST

## 2021-11-05 ENCOUNTER — TELEPHONE (OUTPATIENT)
Dept: PHYSICAL THERAPY | Facility: CLINIC | Age: 56
End: 2021-11-05

## 2021-11-10 ENCOUNTER — TREATMENT (OUTPATIENT)
Dept: PHYSICAL THERAPY | Facility: CLINIC | Age: 56
End: 2021-11-10

## 2021-11-10 DIAGNOSIS — Z96.611 STATUS POST REVERSE TOTAL REPLACEMENT OF RIGHT SHOULDER: Primary | ICD-10-CM

## 2021-11-10 DIAGNOSIS — M25.511 ACUTE PAIN OF RIGHT SHOULDER: ICD-10-CM

## 2021-11-10 DIAGNOSIS — M25.611 STIFFNESS OF RIGHT SHOULDER JOINT: ICD-10-CM

## 2021-11-10 PROCEDURE — 97140 MANUAL THERAPY 1/> REGIONS: CPT | Performed by: PHYSICAL THERAPIST

## 2021-11-10 PROCEDURE — 97110 THERAPEUTIC EXERCISES: CPT | Performed by: PHYSICAL THERAPIST

## 2021-11-10 RX ORDER — TRAMADOL HYDROCHLORIDE 50 MG/1
50 TABLET ORAL EVERY 4 HOURS PRN
Qty: 60 TABLET | Refills: 0 | Status: SHIPPED | OUTPATIENT
Start: 2021-11-10 | End: 2021-11-22 | Stop reason: SDUPTHER

## 2021-11-10 NOTE — PROGRESS NOTES
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      Objective   See Exercise, Manual, and Modality Logs for complete treatment.   Concluded session with CP to (R) shoulder x10 min as noted.     Assessment/Plan  Tolerated continued manual therapy and progression of therapeutic exercise well today, no increased pain reported during or after exercises.     Progress per Plan of Care and Progress strengthening /stabilization /functional activity           Timed:         Manual Therapy:     20    mins  35368     Therapeutic Exercise:     12    mins  52809     Neuromuscular Alesia:        mins  98934    Therapeutic Activity:          mins  86815     Gait Training:           mins  81639     Ultrasound:          mins  30099    Ionto                                   mins  86070  Self Care                            mins  38533    Un-Timed:  Electrical Stimulation:         mins 21397 ( )  Traction          mins 47824    Timed Treatment:   32   mins   Total Treatment:     50   mins    SANDY Lomeli License #[id number]  Physical Therapist Assistant       "

## 2021-11-12 ENCOUNTER — TREATMENT (OUTPATIENT)
Dept: PHYSICAL THERAPY | Facility: CLINIC | Age: 56
End: 2021-11-12

## 2021-11-12 DIAGNOSIS — Z47.89 ORTHOPEDIC AFTERCARE: ICD-10-CM

## 2021-11-12 DIAGNOSIS — M25.511 ACUTE PAIN OF RIGHT SHOULDER: ICD-10-CM

## 2021-11-12 DIAGNOSIS — M25.611 STIFFNESS OF RIGHT SHOULDER JOINT: ICD-10-CM

## 2021-11-12 DIAGNOSIS — Z96.611 STATUS POST REVERSE TOTAL REPLACEMENT OF RIGHT SHOULDER: Primary | ICD-10-CM

## 2021-11-12 PROCEDURE — 97140 MANUAL THERAPY 1/> REGIONS: CPT | Performed by: PHYSICAL THERAPIST

## 2021-11-12 PROCEDURE — 97110 THERAPEUTIC EXERCISES: CPT | Performed by: PHYSICAL THERAPIST

## 2021-11-12 PROCEDURE — 97530 THERAPEUTIC ACTIVITIES: CPT | Performed by: PHYSICAL THERAPIST

## 2021-11-12 NOTE — PROGRESS NOTES
Physical Therapy Daily Progress Note  Visit: 4    Mason Parada reports: My (R) shdr still hurts, I am frustrated that it is taking this long to recover.      Subjective     Objective   See Exercise, Manual, and Modality Logs for complete treatment.       Assessment & Plan     Assessment    Assessment details: The pt is doing well with her (R) shdr.  Reviewed with the pt that she is tracking along the expected improvement and she should expect this, because it is a process.  Reviewed the pt that she needs to cont her HEP, meds and icing as appropriate.  Pt verbalized understanding.      Plan  Plan details: Progress ROM / strengthening / stabilization / functional activity as tolerated          Manual Therapy:     15     mins  13175;  Therapeutic Exercise:     25     mins  16250;     Neuromuscular Alesia:         mins  24068;    Therapeutic Activity:      10     mins  48402;     Gait Training:            mins  49768;     Ultrasound:           mins  92408;    Electrical Stimulation:          mins  88010 ( );  Dry Needling           mins self-pay  Traction           mins 52570  Canalith Repositioning         mins 48699      Timed Treatment:   50   mins   Total Treatment:     62   mins    Moses Phan PT  [place] License #: [id number]    Physical Therapist

## 2021-11-17 ENCOUNTER — TREATMENT (OUTPATIENT)
Dept: PHYSICAL THERAPY | Facility: CLINIC | Age: 56
End: 2021-11-17

## 2021-11-17 DIAGNOSIS — M25.611 STIFFNESS OF RIGHT SHOULDER JOINT: ICD-10-CM

## 2021-11-17 DIAGNOSIS — Z47.89 ORTHOPEDIC AFTERCARE: ICD-10-CM

## 2021-11-17 DIAGNOSIS — M25.511 ACUTE PAIN OF RIGHT SHOULDER: ICD-10-CM

## 2021-11-17 DIAGNOSIS — Z96.611 STATUS POST REVERSE TOTAL REPLACEMENT OF RIGHT SHOULDER: Primary | ICD-10-CM

## 2021-11-17 PROCEDURE — 97140 MANUAL THERAPY 1/> REGIONS: CPT | Performed by: PHYSICAL THERAPIST

## 2021-11-17 PROCEDURE — 97110 THERAPEUTIC EXERCISES: CPT | Performed by: PHYSICAL THERAPIST

## 2021-11-17 NOTE — PROGRESS NOTES
Physical Therapy Daily Progress Note  Visit: 5    Mason Parada reports: I am hurting today in my (R) shdr because I pushed myself up from bed this morning.  Overall I am seeing the improvement.        Subjective     Objective   See Exercise, Manual, and Modality Logs for complete treatment.       Assessment & Plan     Assessment  Assessment details: The pt is doing well.  She continues to have restricted (R) shdr ROM and pain, but as expected sp Sx.      Plan  Plan details: Progress ROM / strengthening / stabilization / functional activity as tolerated          Manual Therapy:     15     mins  50598;  Therapeutic Exercise:     25     mins  31182;     Neuromuscular Alesia:         mins  04339;    Therapeutic Activity:           mins  95454;     Gait Training:            mins  20656;     Ultrasound:           mins  19686;    Electrical Stimulation:          mins  73720 ( );  Dry Needling           mins self-pay  Traction           mins 47130  Canalith Repositioning         mins 36430      Timed Treatment:   40   mins   Total Treatment:     40   mins    Moses Phan PT  [place] License #: [id number]    Physical Therapist

## 2021-11-19 ENCOUNTER — TREATMENT (OUTPATIENT)
Dept: PHYSICAL THERAPY | Facility: CLINIC | Age: 56
End: 2021-11-19

## 2021-11-19 DIAGNOSIS — M25.611 STIFFNESS OF RIGHT SHOULDER JOINT: ICD-10-CM

## 2021-11-19 DIAGNOSIS — Z96.611 STATUS POST REVERSE TOTAL REPLACEMENT OF RIGHT SHOULDER: Primary | ICD-10-CM

## 2021-11-19 DIAGNOSIS — M25.511 ACUTE PAIN OF RIGHT SHOULDER: ICD-10-CM

## 2021-11-19 PROCEDURE — 97140 MANUAL THERAPY 1/> REGIONS: CPT | Performed by: PHYSICAL THERAPIST

## 2021-11-19 PROCEDURE — 97110 THERAPEUTIC EXERCISES: CPT | Performed by: PHYSICAL THERAPIST

## 2021-11-19 NOTE — PROGRESS NOTES
Physical Therapy Daily Progress Note  Visit: 6    Mason Parada reports:  I'm feeling sore and tight all over.      Subjective     Objective   See Exercise, Manual, and Modality Logs for complete treatment.   Concluded with CP to shoulder x10 min.     Assessment & Plan     Assessment  Assessment details: Making steady progress.  Tolerated addition of gentle AAROM shoulder flexion with pulleys well.  Remains limited and sore but demos consistent improvements with range throughout today's Rx.     Plan  Plan details: Progress ROM / strengthening / stabilization / functional activity as tolerated          Manual Therapy:     15     mins  13703;  Therapeutic Exercise:     25     mins  70677;     Neuromuscular Alesia:         mins  00588;    Therapeutic Activity:           mins  14760;     Gait Training:            mins  91544;     Ultrasound:           mins  29855;    Electrical Stimulation:          mins  40670 ( );  Dry Needling           mins self-pay  Traction           mins 93936  Canalith Repositioning         mins 72566      Timed Treatment:   40   mins   Total Treatment:     55   mins    SANDY Lomeli License #[id number]  Physical Therapist Assistant

## 2021-11-22 ENCOUNTER — OFFICE VISIT (OUTPATIENT)
Dept: ORTHOPEDIC SURGERY | Facility: CLINIC | Age: 56
End: 2021-11-22

## 2021-11-22 VITALS — BODY MASS INDEX: 28.22 KG/M2 | HEIGHT: 59 IN | WEIGHT: 140 LBS | TEMPERATURE: 97.7 F

## 2021-11-22 DIAGNOSIS — Z09 SURGERY FOLLOW-UP: Primary | ICD-10-CM

## 2021-11-22 PROCEDURE — 73030 X-RAY EXAM OF SHOULDER: CPT | Performed by: NURSE PRACTITIONER

## 2021-11-22 PROCEDURE — 99024 POSTOP FOLLOW-UP VISIT: CPT | Performed by: NURSE PRACTITIONER

## 2021-11-22 NOTE — PROGRESS NOTES
"Mason Parada : 1965 MRN: [id number] DATE: 2021    DIAGNOSIS: 6 week follow up right shoulder arthroplasty      SUBJECTIVE:  Patient returns today for 6 week follow up of right shoulder replacement. Patient reports doing well with no unusual complaints.     OBJECTIVE:    Temp 97.7 °F (36.5 °C)   Ht 149.9 cm (59\")   Wt 63.5 kg (140 lb)   LMP  (LMP Unknown)   BMI 28.28 kg/m²     Exam: The incision is well healed. No erythema or drainage. Shoulder moves fluidly with pendulums.  Motion is on track per protocol.  The arm is soft and nontender.  Good motor and sensory function.  Palpable distal pulses.     DIAGNOSTIC STUDIES    Xrays: AP and scapular Y views of the right shoulder are ordered and reviewed for evaluation of shoulder replacement.  They demonstrate a well positioned, well aligned replacement without complicating factors noted.  In comparison with previous films there has been no change.    ASSESSMENT: 6 week follow up right shoulder replacement    PLAN:   1.  Continue PT per protocol.  2.  Discontinue sling and begin working on progressing ROM as tolerated.  3.  We discussed the need for prophylactic antibiotics prior to dental appointments.  4.  Counseled patient about appropriate activity modifications and restrictions.  Released to drive at this point.  5.  Patient to remain off work until next clinic appointment.   6.  Follow-up in 6 weeks with Dr. Ventura.    Arelis Mujica, APRN    2021     "

## 2021-11-23 RX ORDER — TRAMADOL HYDROCHLORIDE 50 MG/1
50 TABLET ORAL EVERY 4 HOURS PRN
Qty: 60 TABLET | Refills: 0 | Status: SHIPPED | OUTPATIENT
Start: 2021-11-23 | End: 2021-12-06 | Stop reason: SDUPTHER

## 2021-11-24 ENCOUNTER — TREATMENT (OUTPATIENT)
Dept: PHYSICAL THERAPY | Facility: CLINIC | Age: 56
End: 2021-11-24

## 2021-11-24 DIAGNOSIS — M25.611 STIFFNESS OF RIGHT SHOULDER JOINT: ICD-10-CM

## 2021-11-24 DIAGNOSIS — Z96.611 STATUS POST REVERSE TOTAL REPLACEMENT OF RIGHT SHOULDER: Primary | ICD-10-CM

## 2021-11-24 DIAGNOSIS — M25.511 ACUTE PAIN OF RIGHT SHOULDER: ICD-10-CM

## 2021-11-24 PROCEDURE — 97110 THERAPEUTIC EXERCISES: CPT | Performed by: PHYSICAL THERAPIST

## 2021-11-24 PROCEDURE — 97140 MANUAL THERAPY 1/> REGIONS: CPT | Performed by: PHYSICAL THERAPIST

## 2021-11-24 NOTE — PROGRESS NOTES
Physical Therapy Daily Progress Note  Visit: 7    Mason Parada reports:  The MD took me out of the sling 11/22.  I have had a little more trouble sleeping since then.        Subjective     Objective   See Exercise, Manual, and Modality Logs for complete treatment.  Progressed with AAROM exercises with cane in clinic.    Concluded with CP to shoulder x10 min.     Assessment & Plan     Assessment    Assessment details: Making steady progress, feeling some effects of having arm out of sling and more unsupported over the past two days.  Tolerated progression of program well today in clinic.     Plan  Plan details: Progress ROM / strengthening / stabilization / functional activity as tolerated - progress HEP with AAROM exercises as able          Manual Therapy:     15     mins  94569;  Therapeutic Exercise:     25     mins  45969;     Neuromuscular Alesia:         mins  63352;    Therapeutic Activity:           mins  27757;     Gait Training:            mins  60464;     Ultrasound:           mins  01841;    Electrical Stimulation:          mins  06155 ( );  Dry Needling           mins self-pay  Traction           mins 55345  Canalith Repositioning         mins 66844      Timed Treatment:   40   mins   Total Treatment:     55   mins    SANDY Lomeli License #[id number]  Physical Therapist Assistant

## 2021-12-01 ENCOUNTER — TELEPHONE (OUTPATIENT)
Dept: PHYSICAL THERAPY | Facility: CLINIC | Age: 56
End: 2021-12-01

## 2021-12-01 ENCOUNTER — TELEPHONE (OUTPATIENT)
Dept: SURGERY | Facility: CLINIC | Age: 56
End: 2021-12-01

## 2021-12-01 NOTE — TELEPHONE ENCOUNTER
DELETE AFTER REVIEWING: Telephone encounter to be sent to the clinical pool   Hub staff attempted to follow warm transfer process and was unsuccessful     Caller: Mason Parada    Relationship to patient: Self    Best call back number: [phone]    Patient is needing: PT RECENTLY HAD SHOULDER SURGERY AND IS UNABLE TO LIFT ARM, NEEDS TO RESCHEDULE HER MAMMOGRAM, PLEASE GIVE PT A CALL  HUB WAS UNABLE TO WARM TRANSFER PT

## 2021-12-03 ENCOUNTER — TREATMENT (OUTPATIENT)
Dept: PHYSICAL THERAPY | Facility: CLINIC | Age: 56
End: 2021-12-03

## 2021-12-03 DIAGNOSIS — M25.511 ACUTE PAIN OF RIGHT SHOULDER: ICD-10-CM

## 2021-12-03 DIAGNOSIS — Z96.611 STATUS POST REVERSE TOTAL REPLACEMENT OF RIGHT SHOULDER: Primary | ICD-10-CM

## 2021-12-03 DIAGNOSIS — M25.611 STIFFNESS OF RIGHT SHOULDER JOINT: ICD-10-CM

## 2021-12-03 PROCEDURE — 97110 THERAPEUTIC EXERCISES: CPT | Performed by: PHYSICAL THERAPIST

## 2021-12-03 PROCEDURE — 97140 MANUAL THERAPY 1/> REGIONS: CPT | Performed by: PHYSICAL THERAPIST

## 2021-12-03 NOTE — PROGRESS NOTES
Physical Therapy Daily Progress Note  Visit: 8    Masoncheryl Parada reports:  The MD took me out of the sling 11/22.  I have had a little more trouble sleeping since then.        Subjective     Objective   See Exercise, Manual, and Modality Logs for complete treatment.  Progressed HEP with shoulder isometrics, pt familiar from pre-surgical rehab.     Concluded with CP to shoulder x10 min.     Assessment & Plan     Assessment    Assessment details: Making steady progress, noting positive effect of having arm out of sling and walking more normally with arm swing.  Tolerated progression of TE program and HEP well today in clinic.     Plan  Plan details: Progress ROM / strengthening / stabilization / functional activity as tolerated - progress HEP with AAROM exercises as able          Manual Therapy:     10     mins  65058;  Therapeutic Exercise:     30     mins  91765;     Neuromuscular Alesia:         mins  76748;    Therapeutic Activity:           mins  53063;     Gait Training:            mins  65104;     Ultrasound:           mins  09618;    Electrical Stimulation:          mins  70983 ( );  Dry Needling           mins self-pay  Traction           mins 22825  Canalith Repositioning         mins 73360      Timed Treatment:   40   mins   Total Treatment:     55   mins    SANDY Lomeli License #[id number]  Physical Therapist Assistant

## 2021-12-06 DIAGNOSIS — Z09 SURGERY FOLLOW-UP: Primary | ICD-10-CM

## 2021-12-07 ENCOUNTER — TREATMENT (OUTPATIENT)
Dept: PHYSICAL THERAPY | Facility: CLINIC | Age: 56
End: 2021-12-07

## 2021-12-07 ENCOUNTER — TELEPHONE (OUTPATIENT)
Dept: SURGERY | Facility: CLINIC | Age: 56
End: 2021-12-07

## 2021-12-07 DIAGNOSIS — M25.511 ACUTE PAIN OF RIGHT SHOULDER: ICD-10-CM

## 2021-12-07 DIAGNOSIS — Z96.611 STATUS POST REVERSE TOTAL REPLACEMENT OF RIGHT SHOULDER: Primary | ICD-10-CM

## 2021-12-07 DIAGNOSIS — M25.611 STIFFNESS OF RIGHT SHOULDER JOINT: ICD-10-CM

## 2021-12-07 PROCEDURE — 97110 THERAPEUTIC EXERCISES: CPT | Performed by: PHYSICAL THERAPIST

## 2021-12-07 PROCEDURE — 97530 THERAPEUTIC ACTIVITIES: CPT | Performed by: PHYSICAL THERAPIST

## 2021-12-07 NOTE — PROGRESS NOTES
Physical Therapy Daily Progress Note  Visit: 9    Mason Parada reports:  I had a fair amount of pain after the last PT session.  It did get better the next day and the following day throughout the weekend.      Subjective     Objective   See Exercise, Manual, and Modality Logs for complete treatment.  Concluded with CP to shoulder x10 min.     Assessment & Plan     Assessment    Assessment details:   Tolerated continued therapeutic exercise well today, no increased pain reported during or after exercises.  Did note pain after initiating isometric exercises last visit for HEP, did not progress exercises/HEP today d/t these reports.     Plan  Plan details: Progress ROM / strengthening / stabilization / functional activity as tolerated - progress HEP with AAROM exercises as able          Manual Therapy:          mins  39067;  Therapeutic Exercise:     30     mins  29490;     Neuromuscular Alesia:         mins  04908;    Therapeutic Activity:      18     mins  96802;     Gait Training:            mins  65318;     Ultrasound:           mins  16091;    Electrical Stimulation:          mins  34654 ( );  Dry Needling           mins self-pay  Traction           mins 94599  Canalith Repositioning         mins 79884      Timed Treatment:   48   mins   Total Treatment:     62   mins    SANDY Lomeli License #[id number]  Physical Therapist Assistant

## 2021-12-08 ENCOUNTER — TELEPHONE (OUTPATIENT)
Dept: SURGERY | Facility: CLINIC | Age: 56
End: 2021-12-08

## 2021-12-08 RX ORDER — TRAMADOL HYDROCHLORIDE 50 MG/1
50 TABLET ORAL EVERY 4 HOURS PRN
Qty: 60 TABLET | Refills: 0 | Status: SHIPPED | OUTPATIENT
Start: 2021-12-08 | End: 2021-12-20 | Stop reason: SDUPTHER

## 2021-12-08 NOTE — TELEPHONE ENCOUNTER
Patient calling back regarding missed MGM appt. Patient recently had shoulder surgery and PT wants her to wait at least until Feb to do MGM. I will call her back then to see if she can schedule.

## 2021-12-10 ENCOUNTER — TREATMENT (OUTPATIENT)
Dept: PHYSICAL THERAPY | Facility: CLINIC | Age: 56
End: 2021-12-10

## 2021-12-10 DIAGNOSIS — Z96.611 STATUS POST REVERSE TOTAL REPLACEMENT OF RIGHT SHOULDER: Primary | ICD-10-CM

## 2021-12-10 DIAGNOSIS — M25.511 ACUTE PAIN OF RIGHT SHOULDER: ICD-10-CM

## 2021-12-10 DIAGNOSIS — M25.611 STIFFNESS OF RIGHT SHOULDER JOINT: ICD-10-CM

## 2021-12-10 DIAGNOSIS — Z47.89 ORTHOPEDIC AFTERCARE: ICD-10-CM

## 2021-12-10 PROCEDURE — 97110 THERAPEUTIC EXERCISES: CPT | Performed by: PHYSICAL THERAPIST

## 2021-12-10 PROCEDURE — 97530 THERAPEUTIC ACTIVITIES: CPT | Performed by: PHYSICAL THERAPIST

## 2021-12-10 NOTE — PROGRESS NOTES
Physical Therapy Daily Progress Note  Visit: 10    Mason Parada reports: I have been really sore in my (R) shdr since my last visit.  I have done my HEP and used ice.    Subjective     Objective   See Exercise, Manual, and Modality Logs for complete treatment.       Assessment & Plan     Assessment    Assessment details: Progressed exercise today with good tolerance.  Instructed about relaxing her (R) shdr with exercises and pt did better.  The pt continues to be restricted with (R) shdr ROM.  Progressed in a more strengthening direction today.      Sold pulleys for home use.     Plan  Plan details: Progress ROM / strengthening / stabilization / functional activity as tolerated          Sold Pulleys:  $20         Manual Therapy:          mins  21435;  Therapeutic Exercise:     40     mins  28149;     Neuromuscular Alesia:         mins  80330;    Therapeutic Activity:      15     mins  63440;     Gait Training:            mins  75612;     Ultrasound:           mins  12750;    Electrical Stimulation:          mins  82566 ( );  Dry Needling           mins self-pay  Traction           mins 40609  Canalith Repositioning         mins 19424      Timed Treatment:   55   mins   Total Treatment:     55   mins    ARI Prado License #: [id number]    Physical Therapist

## 2021-12-17 ENCOUNTER — TREATMENT (OUTPATIENT)
Dept: PHYSICAL THERAPY | Facility: CLINIC | Age: 56
End: 2021-12-17

## 2021-12-17 DIAGNOSIS — M25.611 STIFFNESS OF RIGHT SHOULDER JOINT: ICD-10-CM

## 2021-12-17 DIAGNOSIS — M25.511 ACUTE PAIN OF RIGHT SHOULDER: ICD-10-CM

## 2021-12-17 DIAGNOSIS — Z96.611 STATUS POST REVERSE TOTAL REPLACEMENT OF RIGHT SHOULDER: Primary | ICD-10-CM

## 2021-12-17 PROCEDURE — 97530 THERAPEUTIC ACTIVITIES: CPT | Performed by: PHYSICAL THERAPIST

## 2021-12-17 PROCEDURE — 97110 THERAPEUTIC EXERCISES: CPT | Performed by: PHYSICAL THERAPIST

## 2021-12-17 NOTE — PROGRESS NOTES
Physical Therapy Daily Progress Note  Visit: 11    Mason Parada reports:  I can tell things are getting better.  Some of the exercises are getting easier.  The pulleys are helping at home.     Subjective     Objective   See Exercise, Manual, and Modality Logs for complete treatment.     Assessment & Plan     Assessment    Assessment details: Progressed exercise today with good tolerance.  Instructed about relaxing her (R) shdr with exercises and pt did better.     Goals  Plan Goals: STGs 6 weeks:  1. Gain PROM to 140 scaption and 45 ER and 45 IR per protocol - PROGRESSING  2. Tolerate AAROM per protocol - MET  3. Sleep with minimal pain lying on shoulder if tolerated - PROGRESSING  4. Return to light duty work per MD -   5. Q DASH to improve from 72 to < 45%  6. Follow precautions regarding lifting 1# and no excessive ER, IR or sh extension    LTGs 12 weeks:  1. PROM to 90% with gentle end range stretching   2. Lifting 3# OH with no issue  3. AROM to 140 flexion with no scapular hiking  4. Follow precautions and proceed to [facility] for more advance strength work indep  5. Q DASH score < 25%    Plan  Plan details: Progress ROM / strengthening / stabilization / functional activity as tolerated              Manual Therapy:          mins  40260;  Therapeutic Exercise:     32     mins  29733;     Neuromuscular Alesia:         mins  77198;    Therapeutic Activity:      12     mins  24593;     Gait Training:            mins  53340;     Ultrasound:           mins  80779;    Electrical Stimulation:          mins  36006 ( );  Dry Needling           mins self-pay  Traction           mins 82342  Canalith Repositioning         mins 28176      Timed Treatment:   44   mins   Total Treatment:     49   mins    SANDY Lomeli License #[id number]  Physical Therapist Assistant

## 2021-12-20 DIAGNOSIS — Z09 SURGERY FOLLOW-UP: ICD-10-CM

## 2021-12-20 RX ORDER — TRAMADOL HYDROCHLORIDE 50 MG/1
50 TABLET ORAL EVERY 4 HOURS PRN
Qty: 60 TABLET | Refills: 0 | Status: SHIPPED | OUTPATIENT
Start: 2021-12-20 | End: 2022-01-04 | Stop reason: SDUPTHER

## 2021-12-21 ENCOUNTER — TREATMENT (OUTPATIENT)
Dept: PHYSICAL THERAPY | Facility: CLINIC | Age: 56
End: 2021-12-21

## 2021-12-21 DIAGNOSIS — M25.611 STIFFNESS OF RIGHT SHOULDER JOINT: ICD-10-CM

## 2021-12-21 DIAGNOSIS — Z96.611 STATUS POST REVERSE TOTAL REPLACEMENT OF RIGHT SHOULDER: Primary | ICD-10-CM

## 2021-12-21 DIAGNOSIS — M25.511 ACUTE PAIN OF RIGHT SHOULDER: ICD-10-CM

## 2021-12-21 PROCEDURE — 97530 THERAPEUTIC ACTIVITIES: CPT | Performed by: PHYSICAL THERAPIST

## 2021-12-21 PROCEDURE — 97110 THERAPEUTIC EXERCISES: CPT | Performed by: PHYSICAL THERAPIST

## 2021-12-21 NOTE — PROGRESS NOTES
Physical Therapy Daily Progress Note  Visit: 12    Mason Parada reports:  No new c/o today vs last PT visit.      Subjective     Objective   See Exercise, Manual, and Modality Logs for complete treatment.     Assessment & Plan     Assessment    Assessment details:   Tolerated continued progression of therapeutic exercise well today, no increased pain reported during or after exercises.       Goals  Plan Goals: STGs 6 weeks:  1. Gain PROM to 140 scaption and 45 ER and 45 IR per protocol - PROGRESSING  2. Tolerate AAROM per protocol - MET  3. Sleep with minimal pain lying on shoulder if tolerated - PROGRESSING  4. Return to light duty work per MD -   5. Q DASH to improve from 72 to < 45%  6. Follow precautions regarding lifting 1# and no excessive ER, IR or sh extension    LTGs 12 weeks:  1. PROM to 90% with gentle end range stretching   2. Lifting 3# OH with no issue  3. AROM to 140 flexion with no scapular hiking  4. Follow precautions and proceed to [facility] for more advance strength work indep  5. Q DASH score < 25%    Plan  Plan details: Progress ROM / strengthening / stabilization / functional activity as tolerated              Manual Therapy:          mins  03376;  Therapeutic Exercise:     35     mins  77994;     Neuromuscular Alesia:         mins  19130;    Therapeutic Activity:      12     mins  59508;     Gait Training:            mins  13215;     Ultrasound:           mins  75168;    Electrical Stimulation:          mins  89241 ( );  Dry Needling           mins self-pay  Traction           mins 68137  Canalith Repositioning         mins 47764      Timed Treatment:   47   mins   Total Treatment:     60   mins    SANDY Lomeli License #[id number]  Physical Therapist Assistant

## 2021-12-23 ENCOUNTER — TREATMENT (OUTPATIENT)
Dept: PHYSICAL THERAPY | Facility: CLINIC | Age: 56
End: 2021-12-23

## 2021-12-23 DIAGNOSIS — M25.611 STIFFNESS OF RIGHT SHOULDER JOINT: ICD-10-CM

## 2021-12-23 DIAGNOSIS — M25.511 ACUTE PAIN OF RIGHT SHOULDER: ICD-10-CM

## 2021-12-23 DIAGNOSIS — Z96.611 STATUS POST REVERSE TOTAL REPLACEMENT OF RIGHT SHOULDER: Primary | ICD-10-CM

## 2021-12-23 PROCEDURE — 97110 THERAPEUTIC EXERCISES: CPT | Performed by: PHYSICAL THERAPIST

## 2021-12-23 PROCEDURE — 97530 THERAPEUTIC ACTIVITIES: CPT | Performed by: PHYSICAL THERAPIST

## 2021-12-23 NOTE — PROGRESS NOTES
Physical Therapy Daily Progress Note  Visit: 13    Mason Parada reports:  Has had a stressful couple days, mother has been in the hospital.  Mother is stable and doing better now, but Mason has had to sleep in the hospital chairs at times.     Subjective     Objective   See Exercise, Manual, and Modality Logs for complete treatment.     Assessment & Plan     Assessment    Assessment details:   Tolerated continued progression of therapeutic exercise well today, no increased pain reported during or after exercises.       Goals  Plan Goals: STGs 6 weeks:  1. Gain PROM to 140 scaption and 45 ER and 45 IR per protocol - PROGRESSING  2. Tolerate AAROM per protocol - MET  3. Sleep with minimal pain lying on shoulder if tolerated - PROGRESSING  4. Return to light duty work per MD -   5. Q DASH to improve from 72 to < 45%  6. Follow precautions regarding lifting 1# and no excessive ER, IR or sh extension    LTGs 12 weeks:  1. PROM to 90% with gentle end range stretching   2. Lifting 3# OH with no issue  3. AROM to 140 flexion with no scapular hiking  4. Follow precautions and proceed to [facility] for more advance strength work indep  5. Q DASH score < 25%    Plan  Plan details: Progress ROM / strengthening / stabilization / functional activity as tolerated              Manual Therapy:          mins  19566;  Therapeutic Exercise:     35     mins  03656;     Neuromuscular Alesia:         mins  46079;    Therapeutic Activity:      12     mins  99915;     Gait Training:            mins  76771;     Ultrasound:           mins  08441;    Electrical Stimulation:          mins  63860 ( );  Dry Needling           mins self-pay  Traction           mins 09382  Canalith Repositioning         mins 57318      Timed Treatment:   47   mins   Total Treatment:     62   mins    SANDY Lomeli License #[id number]  Physical Therapist Assistant

## 2021-12-27 ENCOUNTER — OFFICE VISIT (OUTPATIENT)
Dept: FAMILY MEDICINE CLINIC | Facility: CLINIC | Age: 56
End: 2021-12-27

## 2021-12-27 VITALS
TEMPERATURE: 98 F | BODY MASS INDEX: 31.25 KG/M2 | HEART RATE: 90 BPM | SYSTOLIC BLOOD PRESSURE: 120 MMHG | RESPIRATION RATE: 16 BRPM | HEIGHT: 59 IN | WEIGHT: 155 LBS | DIASTOLIC BLOOD PRESSURE: 68 MMHG | OXYGEN SATURATION: 99 %

## 2021-12-27 DIAGNOSIS — M54.2 CHRONIC NECK PAIN: Primary | ICD-10-CM

## 2021-12-27 DIAGNOSIS — G89.29 CHRONIC NECK PAIN: Primary | ICD-10-CM

## 2021-12-27 PROCEDURE — 99213 OFFICE O/P EST LOW 20 MIN: CPT | Performed by: NURSE PRACTITIONER

## 2021-12-27 RX ORDER — BACLOFEN 10 MG/1
10 TABLET ORAL 2 TIMES DAILY
Qty: 60 TABLET | Refills: 0 | Status: SHIPPED | OUTPATIENT
Start: 2021-12-27 | End: 2022-02-16 | Stop reason: SDUPTHER

## 2021-12-27 NOTE — PROGRESS NOTES
Subjective   Mason Parada is a 56 y.o. female.     History of Present Illness   Patient presents to office to discuss chronic neck and shoulder pain and her medication regimen for treatment.  She is still seeing orthopedic surgeon for follow up on her shoulder since surgery and feels it is improving. She is still taking tramadol that is prescribed by ortho at bedtime.  She reports neck pain is ongoing and she will address that but is changing jobs and wants to recuperate fully from her shoulder repair.  She takes Motrin TID and has supplemented with Tylenol arthritis as needed. Renal function was normal with BMP in October.  She understands that Motrin is hard on the renal system and can cause GI issues.   The following portions of the patient's history were reviewed and updated as appropriate: allergies, current medications, past family history, past medical history, past social history, past surgical history and problem list.    Review of Systems   Constitutional: Negative for unexpected weight change.   Respiratory: Negative for shortness of breath.    Cardiovascular: Negative for chest pain and palpitations.   Musculoskeletal: Positive for arthralgias, neck pain and neck stiffness.   Psychiatric/Behavioral: Negative for behavioral problems.       Objective   Physical Exam  Vitals and nursing note reviewed.   Constitutional:       Appearance: Normal appearance. She is well-developed.   Cardiovascular:      Rate and Rhythm: Normal rate.   Pulmonary:      Effort: Pulmonary effort is normal.   Neurological:      Mental Status: She is alert and oriented to person, place, and time.   Psychiatric:         Mood and Affect: Mood normal.         Behavior: Behavior normal.         Thought Content: Thought content normal.         Judgment: Judgment normal.         Assessment/Plan   Diagnoses and all orders for this visit:    1. Chronic neck pain (Primary)  -     baclofen (LIORESAL) 10 MG tablet; Take 1 tablet by mouth 2  (Two) Times a Day.  Dispense: 60 tablet; Refill: 0             Discussed alternate regimen to taking Motrin TID.  Will reduce to BID and add Tylenol arthritis midday and Baclofen at bedtime.  She will use tramadol as needed and may be able to reduce nighttime use of Motrin with addition of muscle relaxer.  Agreed to continue prescribing Tramadol for as needed use for neck pain once ortho is finished prescribing it for her post-surgical shoulder pain.

## 2022-01-03 ENCOUNTER — OFFICE VISIT (OUTPATIENT)
Dept: ORTHOPEDIC SURGERY | Facility: CLINIC | Age: 57
End: 2022-01-03

## 2022-01-03 VITALS — TEMPERATURE: 97.1 F | BODY MASS INDEX: 29.25 KG/M2 | WEIGHT: 145.1 LBS | HEIGHT: 59 IN

## 2022-01-03 DIAGNOSIS — S46.011D TRAUMATIC TEAR OF RIGHT ROTATOR CUFF, UNSPECIFIED TEAR EXTENT, SUBSEQUENT ENCOUNTER: Primary | ICD-10-CM

## 2022-01-03 DIAGNOSIS — Z09 SURGERY FOLLOW-UP: ICD-10-CM

## 2022-01-03 PROCEDURE — 99212 OFFICE O/P EST SF 10 MIN: CPT | Performed by: ORTHOPAEDIC SURGERY

## 2022-01-03 PROCEDURE — 73030 X-RAY EXAM OF SHOULDER: CPT | Performed by: ORTHOPAEDIC SURGERY

## 2022-01-03 NOTE — PROGRESS NOTES
"Mason Parada : 1965 MRN: [id number] DATE: 1/3/2022    DIAGNOSIS: 3 month follow up right shoulder arthroplasty      SUBJECTIVE:  Patient returns today for 3 month follow up of right shoulder replacement.  Patient reports doing well with no unusual complaints. Still in PT and reports making good progress.    OBJECTIVE:    Temp 97.1 °F (36.2 °C) (Temporal)   Ht 149.9 cm (59\")   Wt 65.8 kg (145 lb 1.6 oz)   LMP  (LMP Unknown)   BMI 29.31 kg/m²     Review of Systems negative except as above    Exam:  The incision is well healed. No effusion.  Range of motion:  .  ER 45.  IR to between side and back pocket. The arm is soft and nontender.  Good strength with elevation and abduction.  Sensation intact distally.  Brisk cap refill.    DIAGNOSTIC STUDIES    Xrays: AP, scapular Y and axillary views of the right shoulder are ordered and reviewed for evaluation of shoulder replacement. They demonstrate a well positioned, well aligned replacement without complicating factors noted.  In comparison with previous films there has been no change.    ASSESSMENT: 3 month follow up right shoulder replacement.    PLAN:   1.  Continue appropriate activity modifications and restrictions as discussed  2.  Continue PT per protocol.  3.  I explained that one can expect continued improvement in motion, function, and any residual discomfort for up to 1 year after surgery.  4.  Follow up in 6 months unless experiencing any new or concerning symptoms.    Hari Ventura MD    2022     "

## 2022-01-04 DIAGNOSIS — Z09 SURGERY FOLLOW-UP: ICD-10-CM

## 2022-01-04 RX ORDER — TRAMADOL HYDROCHLORIDE 50 MG/1
50 TABLET ORAL EVERY 4 HOURS PRN
Qty: 60 TABLET | Refills: 0 | Status: SHIPPED | OUTPATIENT
Start: 2022-01-04 | End: 2022-01-23 | Stop reason: SDUPTHER

## 2022-01-05 ENCOUNTER — TELEPHONE (OUTPATIENT)
Dept: ORTHOPEDIC SURGERY | Facility: CLINIC | Age: 57
End: 2022-01-05

## 2022-01-05 NOTE — TELEPHONE ENCOUNTER
Not sure.  It sounds like she was doing well when I last saw her.  Maybe give her a call and ask her.  I am fine with her going back whenever she feels comfortable.

## 2022-01-23 DIAGNOSIS — Z09 SURGERY FOLLOW-UP: ICD-10-CM

## 2022-01-24 RX ORDER — TRAMADOL HYDROCHLORIDE 50 MG/1
50 TABLET ORAL EVERY 4 HOURS PRN
Qty: 60 TABLET | Refills: 0 | Status: SHIPPED | OUTPATIENT
Start: 2022-01-24 | End: 2022-02-11 | Stop reason: SDUPTHER

## 2022-02-04 DIAGNOSIS — M75.121 NONTRAUMATIC COMPLETE TEAR OF RIGHT ROTATOR CUFF: ICD-10-CM

## 2022-02-07 RX ORDER — IBUPROFEN 800 MG/1
800 TABLET ORAL EVERY 8 HOURS PRN
Qty: 90 TABLET | Refills: 1 | Status: SHIPPED | OUTPATIENT
Start: 2022-02-07 | End: 2022-05-02 | Stop reason: SDUPTHER

## 2022-02-11 DIAGNOSIS — Z09 SURGERY FOLLOW-UP: ICD-10-CM

## 2022-02-11 RX ORDER — TRAMADOL HYDROCHLORIDE 50 MG/1
50 TABLET ORAL EVERY 4 HOURS PRN
Qty: 60 TABLET | Refills: 0 | Status: SHIPPED | OUTPATIENT
Start: 2022-02-11 | End: 2022-03-07 | Stop reason: SDUPTHER

## 2022-02-14 DIAGNOSIS — F41.9 ANXIETY: ICD-10-CM

## 2022-02-15 RX ORDER — HYDROXYZINE HYDROCHLORIDE 25 MG/1
TABLET, FILM COATED ORAL
Qty: 30 TABLET | Refills: 0 | Status: SHIPPED | OUTPATIENT
Start: 2022-02-15 | End: 2022-03-17

## 2022-02-16 ENCOUNTER — OFFICE VISIT (OUTPATIENT)
Dept: FAMILY MEDICINE CLINIC | Facility: CLINIC | Age: 57
End: 2022-02-16

## 2022-02-16 VITALS
BODY MASS INDEX: 30.84 KG/M2 | HEART RATE: 83 BPM | TEMPERATURE: 97.5 F | SYSTOLIC BLOOD PRESSURE: 130 MMHG | HEIGHT: 59 IN | DIASTOLIC BLOOD PRESSURE: 84 MMHG | RESPIRATION RATE: 16 BRPM | OXYGEN SATURATION: 98 % | WEIGHT: 153 LBS

## 2022-02-16 DIAGNOSIS — M54.2 CHRONIC NECK PAIN: ICD-10-CM

## 2022-02-16 DIAGNOSIS — M54.50 ACUTE BILATERAL LOW BACK PAIN WITHOUT SCIATICA: Primary | ICD-10-CM

## 2022-02-16 DIAGNOSIS — G89.29 CHRONIC NECK PAIN: ICD-10-CM

## 2022-02-16 PROCEDURE — 99213 OFFICE O/P EST LOW 20 MIN: CPT | Performed by: NURSE PRACTITIONER

## 2022-02-16 RX ORDER — BACLOFEN 10 MG/1
10 TABLET ORAL 2 TIMES DAILY
Qty: 180 TABLET | Refills: 1 | Status: SHIPPED | OUTPATIENT
Start: 2022-02-16 | End: 2022-07-19

## 2022-02-16 NOTE — PROGRESS NOTES
Subjective   Mason Parada is a 56 y.o. female.     History of Present Illness   Patient presents to office for acute bilateral low back pain for several days. States it feels stiff and sore. 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 She would like refill on baclofen and is willing to see PT for evaluation.    The following portions of the patient's history were reviewed and updated as appropriate: allergies, current medications, past family history, past medical history, past social history, past surgical history and problem list.    Review of Systems   Constitutional: Negative for unexpected weight change.   Respiratory: Negative for shortness of breath.    Cardiovascular: Negative for chest pain and palpitations.   Musculoskeletal: Positive for back pain. Negative for gait problem.   Psychiatric/Behavioral: Negative for behavioral problems.       Objective   Physical Exam  Vitals and nursing note reviewed.   Constitutional:       Appearance: Normal appearance. She is well-developed.   Pulmonary:      Effort: Pulmonary effort is normal.   Musculoskeletal:      Lumbar back: Tenderness present. No swelling, edema, deformity, signs of trauma, lacerations, spasms or bony tenderness. No scoliosis.        Back:    Neurological:      Mental Status: She is alert and oriented to person, place, and time.   Psychiatric:         Mood and Affect: Mood normal.         Behavior: Behavior normal.         Thought Content: Thought content normal.         Judgment: Judgment normal.         Assessment/Plan   Diagnoses and all orders for this visit:    1. Acute bilateral low back pain without sciatica (Primary)  -     Ambulatory Referral to Physical Therapy Evaluate and treat    2. Chronic neck pain  -     baclofen (LIORESAL) 10 MG tablet; Take 1 tablet by mouth 2 (Two) Times a Day.  Dispense: 180 tablet; Refill: 1

## 2022-03-07 DIAGNOSIS — Z09 SURGERY FOLLOW-UP: ICD-10-CM

## 2022-03-07 RX ORDER — TRAMADOL HYDROCHLORIDE 50 MG/1
50 TABLET ORAL EVERY 4 HOURS PRN
Qty: 60 TABLET | Refills: 0 | Status: SHIPPED | OUTPATIENT
Start: 2022-03-07 | End: 2022-03-31 | Stop reason: SDUPTHER

## 2022-03-17 DIAGNOSIS — F41.9 ANXIETY: ICD-10-CM

## 2022-03-17 RX ORDER — HYDROXYZINE HYDROCHLORIDE 25 MG/1
TABLET, FILM COATED ORAL
Qty: 30 TABLET | Refills: 0 | Status: SHIPPED | OUTPATIENT
Start: 2022-03-17 | End: 2022-04-13

## 2022-03-30 ENCOUNTER — DOCUMENTATION (OUTPATIENT)
Dept: SURGERY | Facility: CLINIC | Age: 57
End: 2022-03-30

## 2022-03-30 ENCOUNTER — TELEPHONE (OUTPATIENT)
Dept: SURGERY | Facility: CLINIC | Age: 57
End: 2022-03-30

## 2022-03-30 ENCOUNTER — TELEPHONE (OUTPATIENT)
Dept: ONCOLOGY | Facility: CLINIC | Age: 57
End: 2022-03-30

## 2022-03-30 DIAGNOSIS — D05.12 DUCTAL CARCINOMA IN SITU (DCIS) OF LEFT BREAST: ICD-10-CM

## 2022-03-30 DIAGNOSIS — Z90.12 STATUS POST MASTECTOMY, LEFT: Primary | ICD-10-CM

## 2022-03-30 NOTE — TELEPHONE ENCOUNTER
Provider: DR LANDIN  Caller: ELIZABETH  Relationship to Patient: SELF    Reason for Call: ELIZABETH MISSED A CALL IN OCT TO R/S HER APPT.  SHE WOULD LIKE TO SCHEDULE WITH DR LANDIN.    PLEASE ADVISE

## 2022-03-30 NOTE — TELEPHONE ENCOUNTER
CALLED PT WITH HER APPT DATE AND TIME AND THEN THE PT WANTED THE APPT AFTER HER MAMMO DUE TO EX THAT HAS TO BE REFILLED AND DR LANDIN MAY WANT THE MAMMO COMPLETED FIRST - PT HAS BEEN SCHEDULED AND AWARE OF HER TIME

## 2022-03-30 NOTE — TELEPHONE ENCOUNTER
Patient called to set up mammogram and appt.  With Yelena.   Screening [facility] 6/16/22 @ 7:30.

## 2022-03-31 DIAGNOSIS — Z09 SURGERY FOLLOW-UP: ICD-10-CM

## 2022-04-01 RX ORDER — TRAMADOL HYDROCHLORIDE 50 MG/1
50 TABLET ORAL EVERY 4 HOURS PRN
Qty: 60 TABLET | Refills: 0 | Status: SHIPPED | OUTPATIENT
Start: 2022-04-01 | End: 2022-05-09 | Stop reason: SDUPTHER

## 2022-04-05 ENCOUNTER — APPOINTMENT (OUTPATIENT)
Dept: LAB | Facility: HOSPITAL | Age: 57
End: 2022-04-05

## 2022-04-13 DIAGNOSIS — F41.9 ANXIETY: ICD-10-CM

## 2022-04-13 RX ORDER — HYDROXYZINE HYDROCHLORIDE 25 MG/1
TABLET, FILM COATED ORAL
Qty: 30 TABLET | Refills: 1 | Status: SHIPPED | OUTPATIENT
Start: 2022-04-13 | End: 2022-04-22 | Stop reason: SDUPTHER

## 2022-04-22 ENCOUNTER — OFFICE VISIT (OUTPATIENT)
Dept: FAMILY MEDICINE CLINIC | Facility: CLINIC | Age: 57
End: 2022-04-22

## 2022-04-22 VITALS
HEIGHT: 59 IN | WEIGHT: 153 LBS | SYSTOLIC BLOOD PRESSURE: 122 MMHG | OXYGEN SATURATION: 98 % | TEMPERATURE: 98.3 F | BODY MASS INDEX: 30.84 KG/M2 | DIASTOLIC BLOOD PRESSURE: 80 MMHG | RESPIRATION RATE: 16 BRPM | HEART RATE: 83 BPM

## 2022-04-22 DIAGNOSIS — F41.9 ANXIETY: ICD-10-CM

## 2022-04-22 PROCEDURE — 99213 OFFICE O/P EST LOW 20 MIN: CPT | Performed by: NURSE PRACTITIONER

## 2022-04-22 RX ORDER — BUSPIRONE HYDROCHLORIDE 10 MG/1
10 TABLET ORAL 3 TIMES DAILY
Qty: 90 TABLET | Refills: 5 | Status: SHIPPED | OUTPATIENT
Start: 2022-04-22 | End: 2022-05-30 | Stop reason: SDUPTHER

## 2022-04-22 RX ORDER — HYDROXYZINE HYDROCHLORIDE 25 MG/1
TABLET, FILM COATED ORAL
Qty: 180 TABLET | Refills: 1 | Status: SHIPPED | OUTPATIENT
Start: 2022-04-22 | End: 2022-09-26 | Stop reason: SDUPTHER

## 2022-04-22 NOTE — PROGRESS NOTES
"Subjective   Mason Parada is a 56 y.o. female.     History of Present Illness   Mason Parada female 56 y.o., /80   Pulse 83   Temp 98.3 °F (36.8 °C)   Resp 16   Ht 149.9 cm (59\")   Wt 69.4 kg (153 lb)   LMP  (LMP Unknown)   SpO2 98%   BMI 30.90 kg/m²   who presents today for follow up of Anxiety.  She reports increased anxiety since starting back working. She is at [facility]. She would like to start back on her buspirone and needs to increase hydroxyzine at bedtime for sleep.   The following portions of the patient's history were reviewed and updated as appropriate: allergies, current medications, past family history, past medical history, past social history, past surgical history and problem list.    Review of Systems   Constitutional: Negative for unexpected weight change.   Respiratory: Negative for shortness of breath.    Cardiovascular: Negative for chest pain and palpitations.   Psychiatric/Behavioral: Positive for sleep disturbance. Negative for behavioral problems. The patient is nervous/anxious.        Objective   Physical Exam  Vitals and nursing note reviewed.   Constitutional:       Appearance: Normal appearance. She is well-developed.   Cardiovascular:      Rate and Rhythm: Normal rate.   Pulmonary:      Effort: Pulmonary effort is normal.   Neurological:      Mental Status: She is alert and oriented to person, place, and time.   Psychiatric:         Mood and Affect: Mood is anxious.         Behavior: Behavior normal.         Thought Content: Thought content normal.         Judgment: Judgment normal.         Assessment/Plan   Diagnoses and all orders for this visit:    1. Anxiety  -     busPIRone (BUSPAR) 10 MG tablet; Take 1 tablet by mouth 3 (Three) Times a Day.  Dispense: 90 tablet; Refill: 5  -     hydrOXYzine (ATARAX) 25 MG tablet; Take 2-3 tablets at bedtime as needed for sleep  Dispense: 180 tablet; Refill: 1               "

## 2022-04-29 DIAGNOSIS — M75.121 NONTRAUMATIC COMPLETE TEAR OF RIGHT ROTATOR CUFF: ICD-10-CM

## 2022-04-29 RX ORDER — IBUPROFEN 800 MG/1
800 TABLET ORAL EVERY 8 HOURS PRN
Qty: 90 TABLET | Refills: 1 | OUTPATIENT
Start: 2022-04-29

## 2022-05-02 DIAGNOSIS — M75.121 NONTRAUMATIC COMPLETE TEAR OF RIGHT ROTATOR CUFF: ICD-10-CM

## 2022-05-02 RX ORDER — IBUPROFEN 800 MG/1
800 TABLET ORAL EVERY 8 HOURS PRN
Qty: 90 TABLET | Refills: 0 | Status: SHIPPED | OUTPATIENT
Start: 2022-05-02 | End: 2022-05-31 | Stop reason: SDUPTHER

## 2022-05-09 ENCOUNTER — OFFICE VISIT (OUTPATIENT)
Dept: FAMILY MEDICINE CLINIC | Facility: CLINIC | Age: 57
End: 2022-05-09

## 2022-05-09 VITALS
SYSTOLIC BLOOD PRESSURE: 122 MMHG | RESPIRATION RATE: 16 BRPM | BODY MASS INDEX: 30.84 KG/M2 | WEIGHT: 153 LBS | OXYGEN SATURATION: 98 % | HEART RATE: 74 BPM | HEIGHT: 59 IN | DIASTOLIC BLOOD PRESSURE: 80 MMHG | TEMPERATURE: 97.5 F

## 2022-05-09 DIAGNOSIS — Z09 SURGERY FOLLOW-UP: ICD-10-CM

## 2022-05-09 DIAGNOSIS — G89.29 CHRONIC NECK PAIN: Primary | ICD-10-CM

## 2022-05-09 DIAGNOSIS — M54.2 CHRONIC NECK PAIN: Primary | ICD-10-CM

## 2022-05-09 PROCEDURE — 99214 OFFICE O/P EST MOD 30 MIN: CPT | Performed by: NURSE PRACTITIONER

## 2022-05-09 PROCEDURE — 72050 X-RAY EXAM NECK SPINE 4/5VWS: CPT | Performed by: NURSE PRACTITIONER

## 2022-05-09 RX ORDER — PREDNISONE 20 MG/1
TABLET ORAL
Qty: 20 TABLET | Refills: 0 | Status: SHIPPED | OUTPATIENT
Start: 2022-05-09 | End: 2022-06-21

## 2022-05-09 NOTE — PROGRESS NOTES
Subjective   Mason Parada is a 56 y.o. female.     History of Present Illness   Patient has had chronic neck pain for several months.  She wanted to wait until her shoulder improved before seeing specialist for her neck.  She would like to go ahead with referral to Dr. Zendejas.  Needs updated xray of cervical spine and MRI.   The following portions of the patient's history were reviewed and updated as appropriate: allergies, current medications, past family history, past medical history, past social history, past surgical history and problem list.    Review of Systems   Constitutional: Negative for unexpected weight change.   Respiratory: Negative for shortness of breath.    Cardiovascular: Negative for chest pain and palpitations.   Musculoskeletal: Positive for neck pain and neck stiffness.   Psychiatric/Behavioral: Negative for behavioral problems.       Objective   Physical Exam  Vitals and nursing note reviewed.   Constitutional:       Appearance: Normal appearance. She is well-developed.   Pulmonary:      Effort: Pulmonary effort is normal.   Neurological:      Mental Status: She is alert and oriented to person, place, and time.   Psychiatric:         Mood and Affect: Mood normal.         Behavior: Behavior normal.         Thought Content: Thought content normal.         Judgment: Judgment normal.     5 view xray of cervical spine ordered and reviewed by me. Moderate to severe DDD noted in cervical spine specifically C5-C7. No comparison on file.     Assessment/Plan   Diagnoses and all orders for this visit:    1. Chronic neck pain (Primary)  -     XR Spine Cervical Complete 4 or 5 View (In Office)  -     MRI Cervical Spine Without Contrast  -     Ambulatory Referral to Orthopedic Surgery  -     predniSONE (DELTASONE) 20 MG tablet; Take 3 tabs QDPC x 3 days then 2 tabs QDPC x 4 days then 1 tab QDPC x 3 days  Dispense: 20 tablet; Refill: 0

## 2022-05-11 RX ORDER — TRAMADOL HYDROCHLORIDE 50 MG/1
50 TABLET ORAL EVERY 4 HOURS PRN
Qty: 60 TABLET | Refills: 0 | Status: SHIPPED | OUTPATIENT
Start: 2022-05-11 | End: 2022-06-21

## 2022-05-11 NOTE — TELEPHONE ENCOUNTER
I can give her 1 last refill of this medicine.  Please let her know that all future refills will need to come from her PCP.  Thanks.

## 2022-05-17 DIAGNOSIS — H10.13 ALLERGIC CONJUNCTIVITIS OF BOTH EYES: ICD-10-CM

## 2022-05-17 RX ORDER — KETOTIFEN FUMARATE 0.35 MG/ML
SOLUTION/ DROPS OPHTHALMIC
Qty: 5 ML | Refills: 5 | Status: SHIPPED | OUTPATIENT
Start: 2022-05-17 | End: 2022-11-07 | Stop reason: SDUPTHER

## 2022-05-24 ENCOUNTER — TELEPHONE (OUTPATIENT)
Dept: FAMILY MEDICINE CLINIC | Facility: CLINIC | Age: 57
End: 2022-05-24

## 2022-05-24 RX ORDER — PROPRANOLOL HYDROCHLORIDE 20 MG/1
20 TABLET ORAL 3 TIMES DAILY PRN
Qty: 30 TABLET | Refills: 0 | Status: SHIPPED | OUTPATIENT
Start: 2022-05-24 | End: 2022-06-02 | Stop reason: SDUPTHER

## 2022-05-30 DIAGNOSIS — F41.9 ANXIETY: ICD-10-CM

## 2022-05-30 DIAGNOSIS — I10 ESSENTIAL HYPERTENSION: ICD-10-CM

## 2022-05-31 DIAGNOSIS — M75.121 NONTRAUMATIC COMPLETE TEAR OF RIGHT ROTATOR CUFF: ICD-10-CM

## 2022-05-31 RX ORDER — BUSPIRONE HYDROCHLORIDE 10 MG/1
10 TABLET ORAL 3 TIMES DAILY
Qty: 90 TABLET | Refills: 1 | Status: SHIPPED | OUTPATIENT
Start: 2022-05-31 | End: 2022-06-21

## 2022-05-31 RX ORDER — IBUPROFEN 800 MG/1
800 TABLET ORAL EVERY 8 HOURS PRN
Qty: 90 TABLET | Refills: 0 | Status: SHIPPED | OUTPATIENT
Start: 2022-05-31 | End: 2022-07-07

## 2022-05-31 RX ORDER — LISINOPRIL AND HYDROCHLOROTHIAZIDE 25; 20 MG/1; MG/1
1 TABLET ORAL DAILY
Qty: 90 TABLET | Refills: 1 | Status: SHIPPED | OUTPATIENT
Start: 2022-05-31 | End: 2022-07-25 | Stop reason: SDUPTHER

## 2022-05-31 NOTE — TELEPHONE ENCOUNTER
Can you have any prescriptions I just requested sent to the [facility] mail delivery- I could not change it in the system myself.  The Buspar, Lisinopril, and Ibuprofen are what I have asked for refills on.        Buspar and lisinopril sent-Katja  5/31/22 @ 1012

## 2022-06-03 ENCOUNTER — TELEPHONE (OUTPATIENT)
Dept: FAMILY MEDICINE CLINIC | Facility: CLINIC | Age: 57
End: 2022-06-03

## 2022-06-03 RX ORDER — PROPRANOLOL HYDROCHLORIDE 20 MG/1
20 TABLET ORAL 3 TIMES DAILY PRN
Qty: 270 TABLET | Refills: 1 | Status: SHIPPED | OUTPATIENT
Start: 2022-06-03 | End: 2022-11-07 | Stop reason: SDUPTHER

## 2022-06-03 RX ORDER — PROPRANOLOL HYDROCHLORIDE 20 MG/1
20 TABLET ORAL 3 TIMES DAILY PRN
Qty: 270 TABLET | Refills: 1 | Status: SHIPPED | OUTPATIENT
Start: 2022-06-03 | End: 2022-06-03 | Stop reason: SDUPTHER

## 2022-06-16 ENCOUNTER — HOSPITAL ENCOUNTER (OUTPATIENT)
Dept: MAMMOGRAPHY | Facility: HOSPITAL | Age: 57
Discharge: HOME | End: 2022-06-16
Admitting: NURSE PRACTITIONER

## 2022-06-16 DIAGNOSIS — Z90.12 STATUS POST MASTECTOMY, LEFT: ICD-10-CM

## 2022-06-16 DIAGNOSIS — D05.12 DUCTAL CARCINOMA IN SITU (DCIS) OF LEFT BREAST: ICD-10-CM

## 2022-06-16 PROCEDURE — 77067 SCR MAMMO BI INCL CAD: CPT

## 2022-06-16 PROCEDURE — 77063 BREAST TOMOSYNTHESIS BI: CPT

## 2022-06-21 ENCOUNTER — LAB (OUTPATIENT)
Dept: LAB | Facility: HOSPITAL | Age: 57
End: 2022-06-21

## 2022-06-21 ENCOUNTER — OFFICE VISIT (OUTPATIENT)
Dept: ONCOLOGY | Facility: CLINIC | Age: 57
End: 2022-06-21

## 2022-06-21 VITALS
RESPIRATION RATE: 16 BRPM | HEART RATE: 71 BPM | BODY MASS INDEX: 31.27 KG/M2 | HEIGHT: 59 IN | WEIGHT: 155.1 LBS | TEMPERATURE: 97.3 F | SYSTOLIC BLOOD PRESSURE: 129 MMHG | OXYGEN SATURATION: 97 % | DIASTOLIC BLOOD PRESSURE: 84 MMHG

## 2022-06-21 DIAGNOSIS — D05.12 DUCTAL CARCINOMA IN SITU (DCIS) OF LEFT BREAST: ICD-10-CM

## 2022-06-21 DIAGNOSIS — D05.12 DUCTAL CARCINOMA IN SITU (DCIS) OF LEFT BREAST: Primary | ICD-10-CM

## 2022-06-21 DIAGNOSIS — Z90.12 STATUS POST MASTECTOMY, LEFT: ICD-10-CM

## 2022-06-21 LAB
ALBUMIN SERPL-MCNC: 4.2 G/DL (ref 3.5–5.2)
ALBUMIN/GLOB SERPL: 1.8 G/DL (ref 1.1–2.4)
ALP SERPL-CCNC: 81 U/L (ref 38–116)
ALT SERPL W P-5'-P-CCNC: 22 U/L (ref 0–33)
ANION GAP SERPL CALCULATED.3IONS-SCNC: 12.1 MMOL/L (ref 5–15)
AST SERPL-CCNC: 20 U/L (ref 0–32)
BASOPHILS # BLD AUTO: 0.09 10*3/MM3 (ref 0–0.2)
BASOPHILS NFR BLD AUTO: 0.9 % (ref 0–1.5)
BILIRUB SERPL-MCNC: 0.3 MG/DL (ref 0.2–1.2)
BUN SERPL-MCNC: 23 MG/DL (ref 6–20)
BUN/CREAT SERPL: 25 (ref 7.3–30)
CALCIUM SPEC-SCNC: 9.5 MG/DL (ref 8.5–10.2)
CHLORIDE SERPL-SCNC: 106 MMOL/L (ref 98–107)
CO2 SERPL-SCNC: 25.9 MMOL/L (ref 22–29)
CREAT SERPL-MCNC: 0.92 MG/DL (ref 0.6–1.1)
DEPRECATED RDW RBC AUTO: 37.2 FL (ref 37–54)
EGFRCR SERPLBLD CKD-EPI 2021: 74.1 ML/MIN/1.73
EOSINOPHIL # BLD AUTO: 0.26 10*3/MM3 (ref 0–0.4)
EOSINOPHIL NFR BLD AUTO: 2.5 % (ref 0.3–6.2)
ERYTHROCYTE [DISTWIDTH] IN BLOOD BY AUTOMATED COUNT: 11.7 % (ref 12.3–15.4)
GLOBULIN UR ELPH-MCNC: 2.3 GM/DL (ref 1.8–3.5)
GLUCOSE SERPL-MCNC: 187 MG/DL (ref 74–124)
HCT VFR BLD AUTO: 36.2 % (ref 34–46.6)
HGB BLD-MCNC: 12.2 G/DL (ref 12–15.9)
IMM GRANULOCYTES # BLD AUTO: 0.05 10*3/MM3 (ref 0–0.05)
IMM GRANULOCYTES NFR BLD AUTO: 0.5 % (ref 0–0.5)
LYMPHOCYTES # BLD AUTO: 2.46 10*3/MM3 (ref 0.7–3.1)
LYMPHOCYTES NFR BLD AUTO: 23.5 % (ref 19.6–45.3)
MCH RBC QN AUTO: 29.4 PG (ref 26.6–33)
MCHC RBC AUTO-ENTMCNC: 33.7 G/DL (ref 31.5–35.7)
MCV RBC AUTO: 87.2 FL (ref 79–97)
MONOCYTES # BLD AUTO: 0.44 10*3/MM3 (ref 0.1–0.9)
MONOCYTES NFR BLD AUTO: 4.2 % (ref 5–12)
NEUTROPHILS NFR BLD AUTO: 68.4 % (ref 42.7–76)
NEUTROPHILS NFR BLD AUTO: 7.17 10*3/MM3 (ref 1.7–7)
NRBC BLD AUTO-RTO: 0 /100 WBC (ref 0–0.2)
PLATELET # BLD AUTO: 291 10*3/MM3 (ref 140–450)
PMV BLD AUTO: 8.7 FL (ref 6–12)
POTASSIUM SERPL-SCNC: 3.6 MMOL/L (ref 3.5–4.7)
PROT SERPL-MCNC: 6.5 G/DL (ref 6.3–8)
RBC # BLD AUTO: 4.15 10*6/MM3 (ref 3.77–5.28)
SODIUM SERPL-SCNC: 144 MMOL/L (ref 134–145)
WBC NRBC COR # BLD: 10.47 10*3/MM3 (ref 3.4–10.8)

## 2022-06-21 PROCEDURE — 80053 COMPREHEN METABOLIC PANEL: CPT

## 2022-06-21 PROCEDURE — 99214 OFFICE O/P EST MOD 30 MIN: CPT | Performed by: INTERNAL MEDICINE

## 2022-06-21 PROCEDURE — 85025 COMPLETE CBC W/AUTO DIFF WBC: CPT

## 2022-06-21 PROCEDURE — 36415 COLL VENOUS BLD VENIPUNCTURE: CPT

## 2022-06-21 RX ORDER — TAMOXIFEN CITRATE 20 MG/1
20 TABLET ORAL DAILY
Qty: 90 TABLET | Refills: 2 | Status: SHIPPED | OUTPATIENT
Start: 2022-06-21 | End: 2022-07-21

## 2022-06-21 NOTE — PROGRESS NOTES
Subjective     REASON FOR FOLLOW-UP:    1.  DCIS, Tis N0, stage 0 DCIS.  ER positive NC positive  Start date of tamoxifen: April 1, 2019.  · S/p left mastectomy    History of Present Illness   Patient is a 53-year-old female with newly diagnosed DCIS, Tis N), stage 0 DCIS,with oncologic history as following. She has started taking Tamoxifen beginning of April and has been tolerating it well.     Patient's hot flashes are resolved.  She is tolerating well.  No weight gain.  She is got good appetite.  Patient had screening mammogram November 27, 2019 and there was asymmetry which required additional evaluation.  Patient was seen by staff Dr. Vonnie Cat who ordered right diagnostic mammogram and ultrasound.  Which was done December 17, 2020 and was negative.    Interval history:     Patient is doing well with tamoxifen.  No hot flashes.  No increase in weight or hypertension.  Denies vaginal bleeding.  Patient underwent screening mammogram June 16, 2022 which is negative.  Patient is being followed by orthopedic surgery for cervical pain.  MRI of the cervical spine did not show malignancy    Oncologic history:  Patient is a 53-year-old female who works at [employer], she follows up with  Ashlee SHERMAN and also with LANE Hernandez at OB/GYN at [facility].  She had a routine mammogram    November 1, 2018: Had screening mammogram which showed calcifications in the right breast as well as on the left breast.  With prominent axillary lymph nodes in the left axilla.  Screening MMG with [facility] ([facility]): Heterogeneously dense.  1. There is a focal asymmetry and calcifications seen in the posterior one-third central region of the right breast.  2. There is a focal asymmetry with associated calcifications seen in the middle one-third central region of the right breast.  3. There are calcifications seen in the posterior one-third superior region of the left breast.  4. There are prominent axillary lymph nodes in the  "left axilla.    Diagnostic mammogram bilateral, January 4, 2019  The right breast does not show any abnormality in the focal asymmetry does not persist in the right breast.       IMPRESSION:  1. Area in the right breast is benign-negative.  2. Calcifications in the middle one third of the left breast at 12 to 1:00, centrally located 7 cm from the nipple are suspicious. Biopsy is recommended.  3. Calcifications in the posterior one third 1:00 region of the left breast located 8 cm from the nipple are suspicious. Biopsy is recommended.  4. Fatty lymph nodes in the left axilla are suspicious. Aspiration is recommended.  5. Cluster of cysts in the subareolar region of the left breast is suspicious. Cyst aspiration is recommended.  6. Simple cysts in the left breast are benign-negative.    Biopsy, done January 28, 2019.  1/28/2019      Left breast, subareolar, Cyst Aspiration ([facility]):   Left subareolar breast tissue, ultrasound guided aspiration, thin prep:   No malignant cells identified. Cytologic features consistent with benign fibrocystic changes.  -1/4 cc of blood-tinged fluid was aspirated. The walls of the cyst collapsed. There was partial sonographic resolution. Concordant.     Left axillary lymph node FNA ([facility]):  -Procedure not performed, due to no suspicious lymph nodes on 1/28/19 exam.            Biopsy     The subareolar left breast cyst aspiration was negative for any malignant cells.  The left axillary lymph node biopsy was not performed as no suspicious lymph nodes were seen on exam.    January 28/2019, left breast stereotactic biopsy x2  The biopsy of the 12 to 1 o'clock position showed intermediate grade DCIS with central necrosis.  No invasive carcinoma identified.  1. Central Left breast Tissue, 12-1:00, \"tophat\" clip:  Intermediate Grade Cribriform Ductal Carcinoma in Situ with Central Necrosis, Microcalcification and Lobular Extension.   Largest focus of DCIS measuring 6 mm.   No invasive carcinoma " "identified.   Fibrofatty breast tissue also showing florid intraductal hyperplasia of the usual type, cystically dilated and ectatic ducts, patchy mild chronic stromal inflammation, and stromal microcalcifications.      Estrogen receptor 98.96%  Progesterone receptor 92.91%  Ki-67 3.18%.    2. Left Breast Tissue, 1:30, \"minicork\" clip:   Low to Intermediate Grade Cribriform and Solid Ductal Carcinoma in Situ with Central Necrosis, Microcalcifications and Lobular Extension.   Largest focus of DCIS measuring 4 mm.   No invasive carcinoma identified.   Fibrofatty breast tissue also showing columnar cell change, patchy mild chronic stromal inflammation and stromal microcalcifications.      ER+ (98.04%, strong)  [place]+ (88.62%, strong)  Ki67 4.4%    January 13, 2019:  MRI breast shows    IMPRESSION:  1. Biopsy-proven site of malignancy represented by the T-shaped metallic  clip seen at the 12-o'clock position in the left breast. There remains  clumped enhancement that as an aggregate measures on the order of 1.8 cm  that is seen immediately lateral to the T-shaped clip in the location of  calcifications seen mammographically. No evidence for left axillary  adenopathy is appreciated.  2. Biopsy-proven site of malignancy represented by a barrel-shaped  metallic clip in the posterior one-third of the left breast at the  1-o'clock position with clumped enhancement seen on the order of 1 cm  inferior to the metallic clip that measures on the order of 1.1 cm in  greatest dimension. Calcifications appear to be present mammographically  in this region. If further imaging evaluation is desired prior to  surgical therapy, correlation with mammography of the left breast is  suggested. No evidence for left axillary adenopathy is appreciated.  3. There are no findings suspicious for malignancy in the right breast.  4. Scattered enhancement of a benign parenchymal character is noted in  both breasts.\      Patient has family history of " "breast cancer in a maternal aunt in the late 60s.  Her sister had cervical cancer at age 28.  Father had lung cancer at age 62 and  with lung cancer at age 64.    Patient underwent genetic testing.  The [employer] genetic test is negative.    Patient underwent left mastectomy, skin sparing with sentinel lymph node biopsy on 2019.  She has reconstruction done by Dr. Onofre with placement of left breast prepectoral tissue expander.  And placement of acellular dermal matrix.    Pathology shows  Synoptic Checklist   DCIS OF THE BREAST   Breast DCIS - All Specimens   CLINICAL   Clinical History  Prior history of breast cancer    Radiologic Finding  Calcifications    SPECIMEN   Procedure  Total mastectomy    Specimen Laterality  Left    TUMOR   Tumor Site  Upper outer quadrant      Upper inner quadrant    Histologic Type  Ductal carcinoma in situ    Size (Extent) of DCIS  Estimated size of DCIS greatest dimension in Millimeters (mm) is at least: Multifocal with one focus up to 45 mm, two up to 20 mm, and one  up to 23 mm. Millimeters (mm)   Number of Blocks with DCIS  14    Number of Blocks Examined  35    Architectural Patterns  [place]      Cribriform      Micropapillary      Solid    Nuclear Grade  Grade II (intermediate)    Accessory Findings     Necrosis  Present, central (expansive \"comedo\" necrosis)    Microcalcifications  Present in DCIS      Present in nonneoplastic tissue    MARGINS   Margins  Uninvolved by DCIS    Distance of DCIS from Anterior Margin in Millimeters (mm)  18 Millimeters (mm)   Distance of DCIS from Posterior Margin in Millimeters (mm)  15 Millimeters (mm)   Distance of DCIS from Superior Margin in Millimeters (mm)  20 Millimeters (mm)   Distance of DCIS from Inferior Margin in Millimeters (mm)  100 Millimeters (mm)   Distance of DCIS from Medial Margin in Millimeters (mm)  40 Millimeters (mm)   Distance of DCIS from Lateral Margin in Millimeters (mm)  50 Millimeters (mm)   Distance " from Closest Margin in Millimeters (mm)  15 Millimeters (mm)   Closest Margin  Posterior    LYMPH NODES   Regional Lymph Nodes  Uninvolved by tumor cells    Number of Lymph Nodes Examined  2    Number of [place] Nodes Examined  2    PATHOLOGIC STAGE CLASSIFICATION (pTNM, AJCC 8th Edition)   TNM Descriptors  Not applicable    Primary Tumor (pT)  pTis (DCIS)    Regional Lymph Nodes (pN)          Patient is healing up from surgery, has a drain in place. Chemoprevention discussed with patient and decided on starting Tamoxifen after her surgery. After 2 years of tamoxifen we can switch her to aromatase inhibitor if postmenopausal.    Started Tamoxifen in April, 2019. No side effects as of today 05/09/2019 November 2019 screening mammogram showed asymmetry in the right breast  December 17, 2020 right diagnostic mammogram and ultrasound negative    Past Medical History:   Diagnosis Date   • Allergic     sulfa, hydracodone, oxycodone   • Anxiety    • Breast cancer (HCC)     left mastectomy   • Cancer (HCC)     breast   • Carpal tunnel syndrome of right wrist    • Chest pain radiating to jaw     STRESS TEST OK 2018, IN EPIC - D/T STRESS, PANIC    • GERD (gastroesophageal reflux disease)    • H/O Septicemia (CMS/HCC) 2017    H/O Kidney stones   • Headache    • Heart palpitations    • History of breast cancer 2019    LEFT   • History of kidney stones 2017   • History of panic attacks    • Hyperlipidemia    • Hypertension    • Injury of neck     Whiplash 25 years ago         Past Surgical History:   Procedure Laterality Date   • AUGMENTATION MAMMAPLASTY Bilateral    • BREAST BIOPSY Left    • BREAST RECONSTRUCTION Left 03/05/2019    Procedure: left prepectorasl placement of tissue expander with alloderm;  Surgeon: Abdirashid Ding MD;  Location: [facility];  Service: Plastics   • BREAST TISSUE EXPANDER REMOVAL INSERTION OF IMPLANT Bilateral 06/11/2019    Procedure: LEFT REMOVAL OF TISSUE EXPANDER AND PLACEMENT OF  IMPLANT RIGHT AUGMENTATION RIGHT MASTOPEXY FOR SYMMETRY;  Surgeon: Abdirashid Ding MD;  Location: [facility] OR;  Service: Plastics   •  SECTION  1994    x1   • EXTRACORPOREAL SHOCK WAVE LITHOTRIPSY (ESWL)  2017   • FAT GRAFTING Left 2019    Procedure: FAT GRAFTING;  Surgeon: Abdirashid Ding MD;  Location: [facility] MAIN OR;  Service: Plastics   • KIDNEY STONE SURGERY     • MASTECTOMY      left   • MASTECTOMY W/ SENTINEL NODE BIOPSY Left 2019    Procedure: LEFT SKIN-SPARING MASTECTOMY WITH SENTINEL LYMPH NODE BIOPSY;  Surgeon: Vonnie Cat MD;  Location: [facility] OR;  Service: General   • TOTAL SHOULDER ARTHROPLASTY W/ DISTAL CLAVICLE EXCISION Right 10/14/2021    Procedure: Reverse Total Shoulder Arthroplasty;  Surgeon: Hari Ventura MD;  Location: [facility] OR;  Service: Orthopedics;  Laterality: Right;        Current Outpatient Medications on File Prior to Visit   Medication Sig Dispense Refill   • baclofen (LIORESAL) 10 MG tablet Take 1 tablet by mouth 2 (Two) Times a Day. 180 tablet 1   • famotidine (PEPCID) 40 MG tablet Take 40 mg by mouth Every Morning.     • hydrOXYzine (ATARAX) 25 MG tablet Take 2-3 tablets at bedtime as needed for sleep 180 tablet 1   • ibuprofen (ADVIL,MOTRIN) 800 MG tablet Take 1 tablet by mouth Every 8 (Eight) Hours As Needed for Mild Pain . 90 tablet 0   • ketotifen (ZADITOR) 0.025 % ophthalmic solution INSTILL 1 DROP IN BOTH EYES TWICE DAILY 5 mL 5   • lisinopril-hydrochlorothiazide (PRINZIDE,ZESTORETIC) 20-25 MG per tablet Take 1 tablet by mouth Daily. 90 tablet 1   • propranolol (INDERAL) 20 MG tablet Take 1 tablet by mouth 3 (Three) Times a Day As Needed (anxiety). 270 tablet 1   • [DISCONTINUED] tamoxifen (NOLVADEX) 20 MG chemo tablet Take 1 tablet by mouth Daily. 90 tablet 2   • [DISCONTINUED] busPIRone (BUSPAR) 10 MG tablet Take 1 tablet by mouth 3 (Three) Times a Day. 90 tablet 1   • [DISCONTINUED] predniSONE (DELTASONE) 20 MG  tablet Take 3 tabs QDPC x 3 days then 2 tabs QDPC x 4 days then 1 tab QDPC x 3 days 20 tablet 0   • [DISCONTINUED] traMADol (ULTRAM) 50 MG tablet Take 1 tablet by mouth Every 4 (Four) Hours As Needed for Moderate Pain . 60 tablet 0     Current Facility-Administered Medications on File Prior to Visit   Medication Dose Route Frequency Provider Last Rate Last Admin   • Chlorhexidine Gluconate Cloth 2 % pads 1 each  1 each Apply externally Take As Directed Hari Ventura MD            ALLERGIES:    Allergies   Allergen Reactions   • Hydrocodone Itching   • Sulfa Antibiotics Itching     LIP swelling      OB/GYN history:  Age of menstrual.,  12 years  Last menstrual.  Was 2018, patient is perimenopausal   5 para 4, 1 miscarriage.  Age of first childbirth is 29  She did breast-feed all of her kids.  At least for 6 months.  She was exposed to birth control pills for 7 years.  More recently she was on hormone treatment because of heavy.'s which now has been discontinued.      Social history she is an LPN at [employer].  She does not smoke.  She drinks alcohol once a week.  She is  for the last 22 years and has a child living with her who is 15 years old.      Family history: Father had lung cancer and  at age 64 with lung cancer was diagnosed at 62.  Mother is 72 in good health.  She has 1 brother 48 in good health.  She has 1 sister who had cervical cancer at age 28 but is in good health at 47.  She had a paternal aunt who had breast cancer in her late 60s.    Family History   Problem Relation Age of Onset   • Hypertension Mother    • Other Mother         Blood clots   • Emphysema Father    • Lung cancer Father 62   • Bone cancer Father 62   • Lupus Sister    • Cancer Sister 28        Cervical   • Other Brother         kidney stone   • Heart disease Maternal Grandfather 55   • Diabetes Maternal Grandfather    • Hypertension Maternal Grandfather    • Pancreatic cancer Paternal Grandmother 68  "  • Other Paternal Grandfather         kidney stone   • Stroke Maternal Grandmother    • Breast cancer Paternal Aunt         late 60's   • Ovarian cancer Sister 34   • Cancer Other    • Malig Hyperthermia Neg Hx         Review of Systems   Constitutional: Negative for appetite change, chills, diaphoresis, fatigue, fever and unexpected weight change.   HENT: Negative for hearing loss, sore throat and trouble swallowing.    Respiratory: Negative for cough, chest tightness, shortness of breath and wheezing.    Cardiovascular: Negative for chest pain, palpitations and leg swelling.   Gastrointestinal: Negative for abdominal distention, abdominal pain, constipation, diarrhea, nausea and vomiting.   Genitourinary: Negative for dysuria, frequency, hematuria and urgency.   Musculoskeletal: Negative for joint swelling.        No muscle weakness.   Skin: Negative for rash and wound.   Neurological: Negative for seizures, syncope, speech difficulty, weakness, numbness and headaches.   Hematological: Negative for adenopathy. Does not bruise/bleed easily.   Psychiatric/Behavioral: Negative for behavioral problems, confusion and suicidal ideas.   All other systems reviewed and are negative.  Patient has some pain in the left breast mastectomy site.  This is postsurgical in nature.    Objective     Vitals:    06/21/22 0952   BP: 129/84   Pulse: 71   Resp: 16   Temp: 97.3 °F (36.3 °C)   TempSrc: Temporal   SpO2: 97%   Weight: 70.4 kg (155 lb 1.6 oz)   Height: 149.9 cm (59.02\")   PainSc: 0-No pain     Current Status 6/21/2022   ECOG score 0       Physical Exam      CONSTITUTIONAL:  Vital signs reviewed.  No distress, looks comfortable.  EYES:  Conjunctivae and lids unremarkable.  PERRLA  EARS,NOSE,MOUTH,THROAT:  Ears and nose appear unremarkable.  Lips, teeth, gums appear unremarkable.  RESPIRATORY:  Normal respiratory effort.  Lungs clear to auscultation bilaterally.  BREAST: Right breast: No skin changes, no evidence of breast " mass, no nipple discharge, no evidence of any right axillary adenopathy or right supraclavicular adenopathy  Left breast: No evidence of any skin changes, no evidence of any left breast mass and no evidence of left nipple discharge as well as no left axillary adenopathy or left supraclavicular adenopathy.  CARDIOVASCULAR:  Normal S1, S2.  No murmurs rubs or gallops.  No significant lower extremity edema.  GASTROINTESTINAL: Abdomen appears unremarkable.  Nontender.  No hepatomegaly.  No splenomegaly.  LYMPHATIC:  No cervical, supraclavicular, axillary lymphadenopathy.  SKIN:  Warm.  No rashes.  PSYCHIATRIC:  Normal judgment and insight.  Normal mood and affect.  RECENT LABS:  Hematology WBC   Date Value Ref Range Status   06/21/2022 10.47 3.40 - 10.80 10*3/mm3 Final   12/10/2018 8.91 4.50 - 10.70 10*3/mm3 Final     RBC   Date Value Ref Range Status   06/21/2022 4.15 3.77 - 5.28 10*6/mm3 Final   12/10/2018 4.51 3.90 - 5.20 10*6/mm3 Final     Hemoglobin   Date Value Ref Range Status   06/21/2022 12.2 12.0 - 15.9 g/dL Final     Hematocrit   Date Value Ref Range Status   06/21/2022 36.2 34.0 - 46.6 % Final     Platelets   Date Value Ref Range Status   06/21/2022 291 140 - 450 10*3/mm3 Final        Assessment & Plan     1.  Tis N0, stage 0 DCIS of the left breast status post left mastectomy with sentinel lymph nodes on March 5, 2019.  She is here for follow-up to discuss the risks versus the benefits of chemoprevention.  · Screening mammogram had shown focal asymmetry and calcifications in the posterior one third region of the right breast and also in the middle one third region of the right breast.  There is calcification in the posterior one third superior region of the left breast.  There are prominent axillary nodes in the left axilla.  · Diagnostic mammogram, January 4, 2019 shows right breast was negative.,  Calcifications in the left breast 12-1 o'clock position, 7 cm from the nipple, biopsy recommended,  calcifications at 1 o'clock position left breast 8 cm from the nipple, biopsy recommended fatty lymph nodes in the left axilla, aspiration recommended.  Subareolar cyst aspiration suggested  · January 20, 2019 left breast cyst aspiration negative, left breast lymph node biopsy not done due to no suspicious lymph nodes on physical exam,        Left breast biopsy stereotactic x2, 12 to 1 o'clock position biopsy showedDCIS, with central necrosis, 6 mm ER +98%, MI +92%, Ki-67 3.18%, margins are clear  · Left breast biopsy at 130 position shows low to intermediate grade cribriform and solid ductal carcinoma in situ with central necrosis, largest focus 4 mm, no invasive carcinoma, ER 98%, MI 88%, Ki-67 4.4%, margins are clear. [place] lymph nodes negative.  · Patient is 2 weeks out of surgery and healing well. Discussed to start Tamoxifen once she is healed. She is willing to consider that after 2 years of tamoxifen we could potentially switch her to aromatase inhibitor therapy  · Labs show patient is per-menopausal.   · She started taking Tamoxifen in April, 2019 and has been tolerating it well. No hot flashes, joint pains or any other side effects. Discussed the side effects of Tamoxifen again today.   · Educated patient to see her Gyn annually to monitor for risk of uterine cancer and to follow up with her GYN if she experiences any abnormal vaginal bleeding.   · June 21, 2022: Tolerating tamoxifen very well with plans on completing it at 7 years    2.  Family history positive for cervical cancer in her sister who was 28 years old, breast cancer in paternal aunt in late 60s and lung cancer in her dad at age 62.  Genetic testing has been done. [employer] testing is negative for any mutations.    3.  History of multiple kidney stones, had 16 kidney stones, underwent 2 laser treatments and one lithotripsy so far.  She was told to not take calcium supplements.  She had sepsis with 1 of her kidney stones during admission to  [facility].  Stable for now  · Stable no evidence of kidney stones  4.  Mild vitamin D deficiency.  · Patient to take vitamin D      5. Osteoarthritis.     6.  Hypertension stable on lisinopril    Plan   1. Continue tamoxifen  2. Reviewed screening mammogram from June 2022 and that is negative  3. Reviewed MRI of the cervical spine which is negative followed by Dr. Ventura orthopedic and Dr. Chaidez  4. Follow-up with me in 6 months with labs    Monitoring high risk medication  Baylee Bailey MD         CC- L[address] [place] Stephanie MD

## 2022-06-28 ENCOUNTER — TELEPHONE (OUTPATIENT)
Dept: FAMILY MEDICINE CLINIC | Facility: CLINIC | Age: 57
End: 2022-06-28

## 2022-06-28 DIAGNOSIS — G89.29 CHRONIC NECK PAIN: Primary | ICD-10-CM

## 2022-06-28 DIAGNOSIS — M54.2 CHRONIC NECK PAIN: Primary | ICD-10-CM

## 2022-06-28 RX ORDER — PREDNISONE 20 MG/1
TABLET ORAL
Qty: 20 TABLET | Refills: 0 | Status: SHIPPED | OUTPATIENT
Start: 2022-06-28 | End: 2022-07-19

## 2022-06-28 NOTE — TELEPHONE ENCOUNTER
"----- Message from Becky Maxwell MA sent at 6/28/2022  8:13 AM EDT -----  Regarding: FW: Neck pain    ----- Message -----  From: Mason Parada  Sent: 6/28/2022   7:51 AM EDT  To: [facility] [place] [facility] Clinical Pool  Subject: Neck pain                                        I have the MRI and waiting til July 19th to see neck specialist.  Currently taking meclizine and baclofen for pain and vertigo.   Neither helping much and miss a day of work at least 1x every 2 weeks, sometimes 2 days missed.  The headaches and pain and vertigo stop me from any movement as soon as I move wrong.  I use ice and Tiger balm to keep moving when I am not too dizzy.  Any other suggestions or is everything in this \"holding pattern\" until I see Dr. Zendejas?    "

## 2022-07-06 ENCOUNTER — OFFICE VISIT (OUTPATIENT)
Dept: ORTHOPEDIC SURGERY | Facility: CLINIC | Age: 57
End: 2022-07-06

## 2022-07-06 VITALS — BODY MASS INDEX: 30.24 KG/M2 | HEIGHT: 59 IN | WEIGHT: 150 LBS | TEMPERATURE: 97.8 F

## 2022-07-06 DIAGNOSIS — Z96.611 STATUS POST REPLACEMENT OF RIGHT SHOULDER JOINT: Primary | ICD-10-CM

## 2022-07-06 DIAGNOSIS — M75.121 NONTRAUMATIC COMPLETE TEAR OF RIGHT ROTATOR CUFF: ICD-10-CM

## 2022-07-06 PROCEDURE — 99212 OFFICE O/P EST SF 10 MIN: CPT | Performed by: ORTHOPAEDIC SURGERY

## 2022-07-06 PROCEDURE — 73030 X-RAY EXAM OF SHOULDER: CPT | Performed by: ORTHOPAEDIC SURGERY

## 2022-07-06 RX ORDER — CEPHALEXIN 500 MG/1
CAPSULE ORAL
Qty: 4 CAPSULE | Refills: 2 | Status: SHIPPED | OUTPATIENT
Start: 2022-07-06 | End: 2023-02-14
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
## 2022-07-07 RX ORDER — IBUPROFEN 800 MG/1
TABLET ORAL
Qty: 90 TABLET | Refills: 0 | Status: SHIPPED | OUTPATIENT
Start: 2022-07-07 | End: 2022-09-08

## 2022-07-19 ENCOUNTER — OFFICE VISIT (OUTPATIENT)
Dept: ORTHOPEDIC SURGERY | Facility: CLINIC | Age: 57
End: 2022-07-19

## 2022-07-19 VITALS — HEIGHT: 59 IN | WEIGHT: 150.1 LBS | BODY MASS INDEX: 30.26 KG/M2 | TEMPERATURE: 97.8 F

## 2022-07-19 DIAGNOSIS — M47.22 CERVICAL SPONDYLOSIS WITH RADICULOPATHY: ICD-10-CM

## 2022-07-19 DIAGNOSIS — M54.2 NECK PAIN: Primary | ICD-10-CM

## 2022-07-19 PROCEDURE — 72040 X-RAY EXAM NECK SPINE 2-3 VW: CPT | Performed by: ORTHOPAEDIC SURGERY

## 2022-07-19 PROCEDURE — 99213 OFFICE O/P EST LOW 20 MIN: CPT | Performed by: ORTHOPAEDIC SURGERY
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
## 2022-07-21 DIAGNOSIS — I10 ESSENTIAL HYPERTENSION: ICD-10-CM

## 2022-07-21 DIAGNOSIS — M54.2 CHRONIC NECK PAIN: ICD-10-CM

## 2022-07-21 DIAGNOSIS — G89.29 CHRONIC NECK PAIN: ICD-10-CM

## 2022-07-21 RX ORDER — LISINOPRIL AND HYDROCHLOROTHIAZIDE 25; 20 MG/1; MG/1
1 TABLET ORAL DAILY
Qty: 90 TABLET | Refills: 3 | OUTPATIENT
Start: 2022-07-21

## 2022-07-21 RX ORDER — BACLOFEN 10 MG/1
10 TABLET ORAL 2 TIMES DAILY
Qty: 180 TABLET | Refills: 1 | OUTPATIENT
Start: 2022-07-21

## 2022-07-21 RX ORDER — TAMOXIFEN CITRATE 20 MG/1
20 TABLET ORAL DAILY
Qty: 90 TABLET | Refills: 2 | Status: SHIPPED | OUTPATIENT
Start: 2022-07-21 | End: 2023-02-14

## 2022-07-25 DIAGNOSIS — I10 ESSENTIAL HYPERTENSION: ICD-10-CM

## 2022-07-25 RX ORDER — LISINOPRIL AND HYDROCHLOROTHIAZIDE 25; 20 MG/1; MG/1
1 TABLET ORAL DAILY
Qty: 90 TABLET | Refills: 1 | Status: SHIPPED | OUTPATIENT
Start: 2022-07-25 | End: 2022-11-07 | Stop reason: SDUPTHER

## 2022-07-26 ENCOUNTER — TELEPHONE (OUTPATIENT)
Dept: SURGERY | Facility: CLINIC | Age: 57
End: 2022-07-26

## 2022-08-03 ENCOUNTER — TELEPHONE (OUTPATIENT)
Dept: SURGERY | Facility: CLINIC | Age: 57
End: 2022-08-03

## 2022-09-03 DIAGNOSIS — M75.121 NONTRAUMATIC COMPLETE TEAR OF RIGHT ROTATOR CUFF: ICD-10-CM

## 2022-09-06 RX ORDER — IBUPROFEN 800 MG/1
TABLET ORAL
Qty: 90 TABLET | Refills: 0 | OUTPATIENT
Start: 2022-09-06

## 2022-09-08 DIAGNOSIS — M75.121 NONTRAUMATIC COMPLETE TEAR OF RIGHT ROTATOR CUFF: ICD-10-CM

## 2022-09-08 RX ORDER — IBUPROFEN 800 MG/1
TABLET ORAL
Qty: 90 TABLET | Refills: 0 | Status: SHIPPED | OUTPATIENT
Start: 2022-09-08 | End: 2022-10-25

## 2022-09-09 DIAGNOSIS — F41.9 ANXIETY: ICD-10-CM

## 2022-09-09 RX ORDER — HYDROXYZINE HYDROCHLORIDE 25 MG/1
TABLET, FILM COATED ORAL
Qty: 30 TABLET | Refills: 1 | OUTPATIENT
Start: 2022-09-09

## 2022-09-22 DIAGNOSIS — F41.9 ANXIETY: ICD-10-CM

## 2022-09-22 RX ORDER — HYDROXYZINE HYDROCHLORIDE 25 MG/1
TABLET, FILM COATED ORAL
Qty: 30 TABLET | Refills: 1 | OUTPATIENT
Start: 2022-09-22

## 2022-09-26 DIAGNOSIS — F41.9 ANXIETY: ICD-10-CM

## 2022-09-27 RX ORDER — HYDROXYZINE HYDROCHLORIDE 25 MG/1
TABLET, FILM COATED ORAL
Qty: 60 TABLET | Refills: 0 | Status: SHIPPED | OUTPATIENT
Start: 2022-09-27 | End: 2022-10-31 | Stop reason: SDUPTHER

## 2022-10-17 ENCOUNTER — TELEPHONE (OUTPATIENT)
Dept: SURGERY | Facility: CLINIC | Age: 57
End: 2022-10-17

## 2022-10-17 NOTE — TELEPHONE ENCOUNTER
Pt called to r/s with Yelena Claros.    She is scheduled Tue 10/25/2022 at 1 pm, arrival 12:45 pm.    Spoke with pt hugo tran.    MEB

## 2022-10-24 DIAGNOSIS — M75.121 NONTRAUMATIC COMPLETE TEAR OF RIGHT ROTATOR CUFF: ICD-10-CM

## 2022-10-25 RX ORDER — IBUPROFEN 800 MG/1
TABLET ORAL
Qty: 30 TABLET | Refills: 0 | Status: SHIPPED | OUTPATIENT
Start: 2022-10-25 | End: 2022-11-07 | Stop reason: SDUPTHER

## 2022-10-25 NOTE — TELEPHONE ENCOUNTER
Rx Refill Note  Requested Prescriptions     Pending Prescriptions Disp Refills   • ibuprofen (ADVIL,MOTRIN) 800 MG tablet [Pharmacy Med Name: ibuprofen 800 mg tablet] 90 tablet 0     Sig: Take 1 tablet by mouth Every 8 (Eight) Hours As Needed for Mild Pain.      Last office visit with prescribing clinician: 5/9/2022      Next office visit with prescribing clinician: Visit date not found       Pt is needing an appointment       {

## 2022-10-31 DIAGNOSIS — F41.9 ANXIETY: ICD-10-CM

## 2022-10-31 RX ORDER — HYDROXYZINE HYDROCHLORIDE 25 MG/1
TABLET, FILM COATED ORAL
Qty: 60 TABLET | Refills: 0 | OUTPATIENT
Start: 2022-10-31

## 2022-11-01 RX ORDER — HYDROXYZINE HYDROCHLORIDE 25 MG/1
TABLET, FILM COATED ORAL
Qty: 60 TABLET | Refills: 0 | Status: SHIPPED | OUTPATIENT
Start: 2022-11-01 | End: 2022-11-07 | Stop reason: SDUPTHER

## 2022-11-01 NOTE — TELEPHONE ENCOUNTER
Rx Refill Note  Requested Prescriptions     Pending Prescriptions Disp Refills   • hydrOXYzine (ATARAX) 25 MG tablet 60 tablet 0     Sig: Take 2-3 tablets at bedtime as needed for sleep      Last office visit with prescribing clinician: 5/9/2022      Next office visit with prescribing clinician: 10/31/2022   {

## 2022-11-07 ENCOUNTER — OFFICE VISIT (OUTPATIENT)
Dept: FAMILY MEDICINE CLINIC | Facility: CLINIC | Age: 57
End: 2022-11-07

## 2022-11-07 VITALS
TEMPERATURE: 97.5 F | HEIGHT: 59 IN | DIASTOLIC BLOOD PRESSURE: 80 MMHG | OXYGEN SATURATION: 96 % | SYSTOLIC BLOOD PRESSURE: 118 MMHG | RESPIRATION RATE: 16 BRPM | WEIGHT: 151 LBS | HEART RATE: 78 BPM | BODY MASS INDEX: 30.44 KG/M2

## 2022-11-07 DIAGNOSIS — I10 ESSENTIAL HYPERTENSION: ICD-10-CM

## 2022-11-07 DIAGNOSIS — F41.9 ANXIETY: ICD-10-CM

## 2022-11-07 DIAGNOSIS — H10.13 ALLERGIC CONJUNCTIVITIS OF BOTH EYES: ICD-10-CM

## 2022-11-07 DIAGNOSIS — M75.121 NONTRAUMATIC COMPLETE TEAR OF RIGHT ROTATOR CUFF: ICD-10-CM

## 2022-11-07 PROCEDURE — 99214 OFFICE O/P EST MOD 30 MIN: CPT | Performed by: NURSE PRACTITIONER

## 2022-11-07 RX ORDER — HYDROXYZINE HYDROCHLORIDE 25 MG/1
TABLET, FILM COATED ORAL
Qty: 180 TABLET | Refills: 3 | Status: SHIPPED | OUTPATIENT
Start: 2022-11-07 | End: 2023-02-14 | Stop reason: SDUPTHER

## 2022-11-07 RX ORDER — IBUPROFEN 800 MG/1
800 TABLET ORAL EVERY 8 HOURS PRN
Qty: 180 TABLET | Refills: 3 | Status: SHIPPED | OUTPATIENT
Start: 2022-11-07 | End: 2023-02-14 | Stop reason: SDUPTHER

## 2022-11-07 RX ORDER — LISINOPRIL AND HYDROCHLOROTHIAZIDE 25; 20 MG/1; MG/1
1 TABLET ORAL DAILY
Qty: 90 TABLET | Refills: 3 | Status: SHIPPED | OUTPATIENT
Start: 2022-11-07 | End: 2023-02-14 | Stop reason: SDUPTHER

## 2022-11-07 RX ORDER — PREDNISONE 20 MG/1
TABLET ORAL
Qty: 20 TABLET | Refills: 0 | Status: SHIPPED | OUTPATIENT
Start: 2022-11-07 | End: 2023-02-14

## 2022-11-07 RX ORDER — KETOTIFEN FUMARATE 0.35 MG/ML
1 SOLUTION/ DROPS OPHTHALMIC 2 TIMES DAILY
Qty: 5 ML | Refills: 5 | Status: SHIPPED | OUTPATIENT
Start: 2022-11-07 | End: 2023-02-14 | Stop reason: SDUPTHER

## 2022-11-07 RX ORDER — PROPRANOLOL HYDROCHLORIDE 20 MG/1
20 TABLET ORAL 3 TIMES DAILY PRN
Qty: 270 TABLET | Refills: 3 | Status: SHIPPED | OUTPATIENT
Start: 2022-11-07

## 2022-11-07 NOTE — PROGRESS NOTES
Subjective   Mason Parada is a 57 y.o. female.     History of Present Illness    Bone Pain (Pt says she is having pain all over, deep pain x 2 weeks.  Stopped tamoxifen and thinks the pain lessened some. )  She stopped tamoxifen on her own 4 days ago and already feels some improvement in pain.      She is also due for refills on her lisinopril/hctz for HTN and her propranolol for palpitations, buspar for anxiety, zaditor for allergic conjunctivitis and hydroxyzine for anxiety.  She feels the regimen is working well for her.    The following portions of the patient's history were reviewed and updated as appropriate: allergies, current medications, past family history, past medical history, past social history, past surgical history and problem list.    Review of Systems   Constitutional: Negative for unexpected weight change.   Respiratory: Negative for shortness of breath.    Cardiovascular: Negative for chest pain and palpitations.   Musculoskeletal: Positive for arthralgias.   Neurological: Positive for weakness.   Psychiatric/Behavioral: Negative for behavioral problems. The patient is nervous/anxious.        Objective   Physical Exam  Vitals and nursing note reviewed.   Constitutional:       Appearance: Normal appearance. She is well-developed.   Cardiovascular:      Rate and Rhythm: Normal rate and regular rhythm.   Pulmonary:      Effort: Pulmonary effort is normal.      Breath sounds: Normal breath sounds.   Neurological:      Mental Status: She is alert and oriented to person, place, and time.   Psychiatric:         Mood and Affect: Mood normal.         Behavior: Behavior normal.         Thought Content: Thought content normal.         Judgment: Judgment normal.         Assessment & Plan   Diagnoses and all orders for this visit:    1. Anxiety  -     hydrOXYzine (ATARAX) 25 MG tablet; Take 2-3 tablets at bedtime as needed for sleep  Dispense: 180 tablet; Refill: 3    2. Nontraumatic complete tear of right  rotator cuff  -     ibuprofen (ADVIL,MOTRIN) 800 MG tablet; Take 1 tablet by mouth Every 8 (Eight) Hours As Needed for Mild Pain.  Dispense: 180 tablet; Refill: 3    3. Allergic conjunctivitis of both eyes  -     ketotifen (ZADITOR) 0.025 % ophthalmic solution; Administer 1 drop to both eyes 2 (Two) Times a Day.  Dispense: 5 mL; Refill: 5    4. Essential hypertension  -     lisinopril-hydrochlorothiazide (PRINZIDE,ZESTORETIC) 20-25 MG per tablet; Take 1 tablet by mouth Daily.  Dispense: 90 tablet; Refill: 3    Other orders  -     propranolol (INDERAL) 20 MG tablet; Take 1 tablet by mouth 3 (Three) Times a Day As Needed (anxiety).  Dispense: 270 tablet; Refill: 3  -     predniSONE (DELTASONE) 20 MG tablet; Take 3 tabs QDPC x 3 days then 2 tabs QDPC x 4 days then 1 tab QDPC x 3 days  Dispense: 20 tablet; Refill: 0        She will hold tamoxifen for a couple of weeks to see if pain resolves and will notify how symptoms are doing at that time.

## 2023-02-02 ENCOUNTER — LAB (OUTPATIENT)
Dept: LAB | Facility: HOSPITAL | Age: 58
End: 2023-02-02
Payer: COMMERCIAL

## 2023-02-02 ENCOUNTER — OFFICE VISIT (OUTPATIENT)
Dept: ONCOLOGY | Facility: CLINIC | Age: 58
End: 2023-02-02
Payer: COMMERCIAL

## 2023-02-02 VITALS
OXYGEN SATURATION: 98 % | HEART RATE: 77 BPM | WEIGHT: 162.1 LBS | DIASTOLIC BLOOD PRESSURE: 88 MMHG | HEIGHT: 59 IN | RESPIRATION RATE: 16 BRPM | BODY MASS INDEX: 32.68 KG/M2 | TEMPERATURE: 97.1 F | SYSTOLIC BLOOD PRESSURE: 141 MMHG

## 2023-02-02 DIAGNOSIS — D05.12 DUCTAL CARCINOMA IN SITU (DCIS) OF LEFT BREAST: Primary | ICD-10-CM

## 2023-02-02 DIAGNOSIS — D05.12 DUCTAL CARCINOMA IN SITU (DCIS) OF LEFT BREAST: ICD-10-CM

## 2023-02-02 LAB
ALBUMIN SERPL-MCNC: 4.6 G/DL (ref 3.5–5.2)
ALBUMIN/GLOB SERPL: 1.8 G/DL (ref 1.1–2.4)
ALP SERPL-CCNC: 84 U/L (ref 38–116)
ALT SERPL W P-5'-P-CCNC: 47 U/L (ref 0–33)
ANION GAP SERPL CALCULATED.3IONS-SCNC: 13.7 MMOL/L (ref 5–15)
AST SERPL-CCNC: 31 U/L (ref 0–32)
BASOPHILS # BLD AUTO: 0.08 10*3/MM3 (ref 0–0.2)
BASOPHILS NFR BLD AUTO: 0.8 % (ref 0–1.5)
BILIRUB SERPL-MCNC: 0.3 MG/DL (ref 0.2–1.2)
BUN SERPL-MCNC: 30 MG/DL (ref 6–20)
BUN/CREAT SERPL: 28.6 (ref 7.3–30)
CALCIUM SPEC-SCNC: 10.4 MG/DL (ref 8.5–10.2)
CHLORIDE SERPL-SCNC: 105 MMOL/L (ref 98–107)
CO2 SERPL-SCNC: 24.3 MMOL/L (ref 22–29)
CREAT SERPL-MCNC: 1.05 MG/DL (ref 0.6–1.1)
DEPRECATED RDW RBC AUTO: 37.4 FL (ref 37–54)
EGFRCR SERPLBLD CKD-EPI 2021: 62.9 ML/MIN/1.73
EOSINOPHIL # BLD AUTO: 0.49 10*3/MM3 (ref 0–0.4)
EOSINOPHIL NFR BLD AUTO: 5.1 % (ref 0.3–6.2)
ERYTHROCYTE [DISTWIDTH] IN BLOOD BY AUTOMATED COUNT: 11.8 % (ref 12.3–15.4)
GLOBULIN UR ELPH-MCNC: 2.5 GM/DL (ref 1.8–3.5)
GLUCOSE SERPL-MCNC: 146 MG/DL (ref 74–124)
HCT VFR BLD AUTO: 39.7 % (ref 34–46.6)
HGB BLD-MCNC: 13.2 G/DL (ref 12–15.9)
IMM GRANULOCYTES # BLD AUTO: 0.05 10*3/MM3 (ref 0–0.05)
IMM GRANULOCYTES NFR BLD AUTO: 0.5 % (ref 0–0.5)
LYMPHOCYTES # BLD AUTO: 2.94 10*3/MM3 (ref 0.7–3.1)
LYMPHOCYTES NFR BLD AUTO: 30.4 % (ref 19.6–45.3)
MCH RBC QN AUTO: 28.9 PG (ref 26.6–33)
MCHC RBC AUTO-ENTMCNC: 33.2 G/DL (ref 31.5–35.7)
MCV RBC AUTO: 86.9 FL (ref 79–97)
MONOCYTES # BLD AUTO: 0.51 10*3/MM3 (ref 0.1–0.9)
MONOCYTES NFR BLD AUTO: 5.3 % (ref 5–12)
NEUTROPHILS NFR BLD AUTO: 5.59 10*3/MM3 (ref 1.7–7)
NEUTROPHILS NFR BLD AUTO: 57.9 % (ref 42.7–76)
NRBC BLD AUTO-RTO: 0 /100 WBC (ref 0–0.2)
PLATELET # BLD AUTO: 354 10*3/MM3 (ref 140–450)
PMV BLD AUTO: 9 FL (ref 6–12)
POTASSIUM SERPL-SCNC: 4 MMOL/L (ref 3.5–4.7)
PROT SERPL-MCNC: 7.1 G/DL (ref 6.3–8)
RBC # BLD AUTO: 4.57 10*6/MM3 (ref 3.77–5.28)
SODIUM SERPL-SCNC: 143 MMOL/L (ref 134–145)
WBC NRBC COR # BLD: 9.66 10*3/MM3 (ref 3.4–10.8)

## 2023-02-02 PROCEDURE — 85025 COMPLETE CBC W/AUTO DIFF WBC: CPT

## 2023-02-02 PROCEDURE — 36415 COLL VENOUS BLD VENIPUNCTURE: CPT

## 2023-02-02 PROCEDURE — 80053 COMPREHEN METABOLIC PANEL: CPT

## 2023-02-02 PROCEDURE — 99214 OFFICE O/P EST MOD 30 MIN: CPT | Performed by: INTERNAL MEDICINE

## 2023-02-02 NOTE — PROGRESS NOTES
Subjective     REASON FOR FOLLOW-UP:    1.  DCIS, Tis N0, stage 0 DCIS.  ER positive MO positive  Start date of tamoxifen: April 1, 2019.  · S/p left mastectomy    History of Present Illness   Patient is a 53-year-old female with newly diagnosed DCIS, Tis N), stage 0 DCIS,with oncologic history as following. She has started taking Tamoxifen beginning of April and has been tolerating it well.     Patient's hot flashes are resolved.  She is tolerating well.  No weight gain.  She is got good appetite.  Patient had screening mammogram November 27, 2019 and there was asymmetry which required additional evaluation.  Patient was seen by staff Dr. Vonnie Cat who ordered right diagnostic mammogram and ultrasound.  Which was done December 17, 2020 and was negative.    Interval history:   Patient has not lost weight not gain weight.  She is tolerating tamoxifen reasonably well.  She does not have any depression, no vaginal bleeding or hot flashes.  She had a mammogram June 2022 which is negative    Oncologic history:  Patient is a 53-year-old female who works at [employer], she follows up with  Ashlee SHERMAN and also with LANE Hernandez at OB/GYN at [facility].  She had a routine mammogram    November 1, 2018: Had screening mammogram which showed calcifications in the right breast as well as on the left breast.  With prominent axillary lymph nodes in the left axilla.  Screening MMG with Celso ([facility]): Heterogeneously dense.  1. There is a focal asymmetry and calcifications seen in the posterior one-third central region of the right breast.  2. There is a focal asymmetry with associated calcifications seen in the middle one-third central region of the right breast.  3. There are calcifications seen in the posterior one-third superior region of the left breast.  4. There are prominent axillary lymph nodes in the left axilla.    Diagnostic mammogram bilateral, January 4, 2019  The right breast does not show any  "abnormality in the focal asymmetry does not persist in the right breast.       IMPRESSION:  1. Area in the right breast is benign-negative.  2. Calcifications in the middle one third of the left breast at 12 to 1:00, centrally located 7 cm from the nipple are suspicious. Biopsy is recommended.  3. Calcifications in the posterior one third 1:00 region of the left breast located 8 cm from the nipple are suspicious. Biopsy is recommended.  4. Fatty lymph nodes in the left axilla are suspicious. Aspiration is recommended.  5. Cluster of cysts in the subareolar region of the left breast is suspicious. Cyst aspiration is recommended.  6. Simple cysts in the left breast are benign-negative.    Biopsy, done January 28, 2019.  1/28/2019      Left breast, subareolar, Cyst Aspiration ([facility]):   Left subareolar breast tissue, ultrasound guided aspiration, thin prep:   No malignant cells identified. Cytologic features consistent with benign fibrocystic changes.  -1/4 cc of blood-tinged fluid was aspirated. The walls of the cyst collapsed. There was partial sonographic resolution. Concordant.     Left axillary lymph node FNA ([facility]):  -Procedure not performed, due to no suspicious lymph nodes on 1/28/19 exam.            Biopsy     The subareolar left breast cyst aspiration was negative for any malignant cells.  The left axillary lymph node biopsy was not performed as no suspicious lymph nodes were seen on exam.    January 28/2019, left breast stereotactic biopsy x2  The biopsy of the 12 to 1 o'clock position showed intermediate grade DCIS with central necrosis.  No invasive carcinoma identified.  1. Central Left breast Tissue, 12-1:00, \"tophat\" clip:  Intermediate Grade Cribriform Ductal Carcinoma in Situ with Central Necrosis, Microcalcification and Lobular Extension.   Largest focus of DCIS measuring 6 mm.   No invasive carcinoma identified.   Fibrofatty breast tissue also showing florid intraductal hyperplasia of the usual type, " "cystically dilated and ectatic ducts, patchy mild chronic stromal inflammation, and stromal microcalcifications.      Estrogen receptor 98.96%  Progesterone receptor 92.91%  Ki-67 3.18%.    2. Left Breast Tissue, 1:30, \"minicork\" clip:   Low to Intermediate Grade Cribriform and Solid Ductal Carcinoma in Situ with Central Necrosis, Microcalcifications and Lobular Extension.   Largest focus of DCIS measuring 4 mm.   No invasive carcinoma identified.   Fibrofatty breast tissue also showing columnar cell change, patchy mild chronic stromal inflammation and stromal microcalcifications.      ER+ (98.04%, strong)  HI+ (88.62%, strong)  Ki67 4.4%    January 13, 2019:  MRI breast shows    IMPRESSION:  1. Biopsy-proven site of malignancy represented by the T-shaped metallic  clip seen at the 12-o'clock position in the left breast. There remains  clumped enhancement that as an aggregate measures on the order of 1.8 cm  that is seen immediately lateral to the T-shaped clip in the location of  calcifications seen mammographically. No evidence for left axillary  adenopathy is appreciated.  2. Biopsy-proven site of malignancy represented by a barrel-shaped  metallic clip in the posterior one-third of the left breast at the  1-o'clock position with clumped enhancement seen on the order of 1 cm  inferior to the metallic clip that measures on the order of 1.1 cm in  greatest dimension. Calcifications appear to be present mammographically  in this region. If further imaging evaluation is desired prior to  surgical therapy, correlation with mammography of the left breast is  suggested. No evidence for left axillary adenopathy is appreciated.  3. There are no findings suspicious for malignancy in the right breast.  4. Scattered enhancement of a benign parenchymal character is noted in  both breasts.\      Patient has family history of breast cancer in a maternal aunt in the late 60s.  Her sister had cervical cancer at age 28.  Father " "had lung cancer at age 62 and  with lung cancer at age 64.    Patient underwent genetic testing.  The [employer] genetic test is negative.    Patient underwent left mastectomy, skin sparing with sentinel lymph node biopsy on 2019.  She has reconstruction done by Dr. Onofre with placement of left breast prepectoral tissue expander.  And placement of acellular dermal matrix.    Pathology shows  Synoptic Checklist   DCIS OF THE BREAST   Breast DCIS - All Specimens   CLINICAL   Clinical History  Prior history of breast cancer    Radiologic Finding  Calcifications    SPECIMEN   Procedure  Total mastectomy    Specimen Laterality  Left    TUMOR   Tumor Site  Upper outer quadrant      Upper inner quadrant    Histologic Type  Ductal carcinoma in situ    Size (Extent) of DCIS  Estimated size of DCIS greatest dimension in Millimeters (mm) is at least: Multifocal with one focus up to 45 mm, two up to 20 mm, and one  up to 23 mm. Millimeters (mm)   Number of Blocks with DCIS  14    Number of Blocks Examined  35    Architectural Patterns  Comedo      Cribriform      Micropapillary      Solid    Nuclear Grade  Grade II (intermediate)    Accessory Findings     Necrosis  Present, central (expansive \"comedo\" necrosis)    Microcalcifications  Present in DCIS      Present in nonneoplastic tissue    MARGINS   Margins  Uninvolved by DCIS    Distance of DCIS from Anterior Margin in Millimeters (mm)  18 Millimeters (mm)   Distance of DCIS from Posterior Margin in Millimeters (mm)  15 Millimeters (mm)   Distance of DCIS from Superior Margin in Millimeters (mm)  20 Millimeters (mm)   Distance of DCIS from Inferior Margin in Millimeters (mm)  100 Millimeters (mm)   Distance of DCIS from Medial Margin in Millimeters (mm)  40 Millimeters (mm)   Distance of DCIS from Lateral Margin in Millimeters (mm)  50 Millimeters (mm)   Distance from Closest Margin in Millimeters (mm)  15 Millimeters (mm)   Closest Margin  Posterior    LYMPH " NODES   Regional Lymph Nodes  Uninvolved by tumor cells    Number of Lymph Nodes Examined  2    Number of [place] Nodes Examined  2    PATHOLOGIC STAGE CLASSIFICATION (pTNM, AJCC 8th Edition)   TNM Descriptors  Not applicable    Primary Tumor (pT)  pTis (DCIS)    Regional Lymph Nodes (pN)          Patient is healing up from surgery, has a drain in place. Chemoprevention discussed with patient and decided on starting Tamoxifen after her surgery. After 2 years of tamoxifen we can switch her to aromatase inhibitor if postmenopausal.    Started Tamoxifen in April, 2019. No side effects as of today 05/09/2019 November 2019 screening mammogram showed asymmetry in the right breast  December 17, 2020 right diagnostic mammogram and ultrasound negative    Past Medical History:   Diagnosis Date   • Allergic     sulfa, hydracodone, oxycodone   • Anxiety    • Breast cancer (HCC)     left mastectomy   • Cancer (HCC)     breast   • Carpal tunnel syndrome of right wrist    • Chest pain radiating to jaw     STRESS TEST OK 2018, IN EPIC - D/T STRESS, PANIC    • GERD (gastroesophageal reflux disease)    • H/O Septicemia (CMS/HCC) 2017    H/O Kidney stones   • Headache    • Heart palpitations    • History of breast cancer 2019    LEFT   • History of kidney stones 2017   • History of panic attacks    • Hyperlipidemia    • Hypertension    • Injury of neck     Whiplash 25 years ago         Past Surgical History:   Procedure Laterality Date   • AUGMENTATION MAMMAPLASTY Bilateral    • BREAST BIOPSY Left    • BREAST RECONSTRUCTION Left 03/05/2019    Procedure: left prepectorasl placement of tissue expander with alloderm;  Surgeon: Abdirashid Ding MD;  Location: [facility];  Service: Plastics   • BREAST TISSUE EXPANDER REMOVAL INSERTION OF IMPLANT Bilateral 06/11/2019    Procedure: LEFT REMOVAL OF TISSUE EXPANDER AND PLACEMENT OF IMPLANT RIGHT AUGMENTATION RIGHT MASTOPEXY FOR SYMMETRY;  Surgeon: Abdirashid Ding MD;   Location: [facility] OR;  Service: Plastics   •  SECTION  1994    x1   • EXTRACORPOREAL SHOCK WAVE LITHOTRIPSY (ESWL)  2017   • FAT GRAFTING Left 2019    Procedure: FAT GRAFTING;  Surgeon: Abdirashid Dnig MD;  Location: [facility] OR;  Service: Plastics   • KIDNEY STONE SURGERY     • MASTECTOMY      left   • MASTECTOMY W/ SENTINEL NODE BIOPSY Left 2019    Procedure: LEFT SKIN-SPARING MASTECTOMY WITH SENTINEL LYMPH NODE BIOPSY;  Surgeon: Vonnie Cat MD;  Location: [facility] OR;  Service: General   • TOTAL SHOULDER ARTHROPLASTY W/ DISTAL CLAVICLE EXCISION Right 10/14/2021    Procedure: Reverse Total Shoulder Arthroplasty;  Surgeon: Hari Ventura MD;  Location: [facility] OR;  Service: Orthopedics;  Laterality: Right;        Current Outpatient Medications on File Prior to Visit   Medication Sig Dispense Refill   • cephalexin (KEFLEX) 500 MG capsule 4 pills one hour prior to procedure 4 capsule 2   • famotidine (PEPCID) 40 MG tablet Take 40 mg by mouth Every Morning.     • hydrOXYzine (ATARAX) 25 MG tablet Take 2-3 tablets at bedtime as needed for sleep 180 tablet 3   • ibuprofen (ADVIL,MOTRIN) 800 MG tablet Take 1 tablet by mouth Every 8 (Eight) Hours As Needed for Mild Pain. 180 tablet 3   • ketotifen (ZADITOR) 0.025 % ophthalmic solution Administer 1 drop to both eyes 2 (Two) Times a Day. 5 mL 5   • lisinopril-hydrochlorothiazide (PRINZIDE,ZESTORETIC) 20-25 MG per tablet Take 1 tablet by mouth Daily. 90 tablet 3   • MAGNESIUM PO Take  by mouth.     • predniSONE (DELTASONE) 20 MG tablet Take 3 tabs QDPC x 3 days then 2 tabs QDPC x 4 days then 1 tab QDPC x 3 days 20 tablet 0   • propranolol (INDERAL) 20 MG tablet Take 1 tablet by mouth 3 (Three) Times a Day As Needed (anxiety). 270 tablet 3   • VITAMIN D PO Take  by mouth.     • tamoxifen (NOLVADEX) 20 MG chemo tablet TAKE 1 TABLET BY MOUTH DAILY. 90 tablet 2     Current Facility-Administered Medications on File Prior to  Visit   Medication Dose Route Frequency Provider Last Rate Last Admin   • Chlorhexidine Gluconate Cloth 2 % pads 1 each  1 each Apply externally Take As Directed Hari Ventura MD            ALLERGIES:    Allergies   Allergen Reactions   • Hydrocodone Itching   • Sulfa Antibiotics Itching     LIP swelling      OB/GYN history:  Age of menstrual.,  12 years  Last menstrual.  Was 2018, patient is perimenopausal   5 para 4, 1 miscarriage.  Age of first childbirth is 29  She did breast-feed all of her kids.  At least for 6 months.  She was exposed to birth control pills for 7 years.  More recently she was on hormone treatment because of heavy.'s which now has been discontinued.      Social history she is an LPN at [employer].  She does not smoke.  She drinks alcohol once a week.  She is  for the last 22 years and has a child living with her who is 15 years old.      Family history: Father had lung cancer and  at age 64 with lung cancer was diagnosed at 62.  Mother is 72 in good health.  She has 1 brother 48 in good health.  She has 1 sister who had cervical cancer at age 28 but is in good health at 47.  She had a paternal aunt who had breast cancer in her late 60s.    Family History   Problem Relation Age of Onset   • Hypertension Mother    • Other Mother         Blood clots   • Emphysema Father    • Lung cancer Father 62   • Bone cancer Father 62   • Lupus Sister    • Cancer Sister 28        Cervical   • Other Brother         kidney stone   • Heart disease Maternal Grandfather 55   • Diabetes Maternal Grandfather    • Hypertension Maternal Grandfather    • Pancreatic cancer Paternal Grandmother 68   • Other Paternal Grandfather         kidney stone   • Stroke Maternal Grandmother    • Breast cancer Paternal Aunt         late 60's   • Ovarian cancer Sister 34   • Cancer Other    • Malig Hyperthermia Neg Hx         Review of Systems   Constitutional: Negative for appetite change, chills,  "diaphoresis, fatigue, fever and unexpected weight change.   HENT: Negative for hearing loss, sore throat and trouble swallowing.    Respiratory: Negative for cough, chest tightness, shortness of breath and wheezing.    Cardiovascular: Negative for chest pain, palpitations and leg swelling.   Gastrointestinal: Negative for abdominal distention, abdominal pain, constipation, diarrhea, nausea and vomiting.   Genitourinary: Negative for dysuria, frequency, hematuria and urgency.   Musculoskeletal: Negative for joint swelling.        No muscle weakness.   Skin: Negative for rash and wound.   Neurological: Negative for seizures, syncope, speech difficulty, weakness, numbness and headaches.   Hematological: Negative for adenopathy. Does not bruise/bleed easily.   Psychiatric/Behavioral: Negative for behavioral problems, confusion and suicidal ideas.   All other systems reviewed and are negative.    I have reviewed and confirmed the accuracy of the ROS as documented by the MA/LPN/RN Baylee Bailey MD        Objective     Vitals:    02/02/23 1507   BP: 141/88   Pulse: 77   Resp: 16   Temp: 97.1 °F (36.2 °C)   TempSrc: Temporal   SpO2: 98%   Weight: 73.5 kg (162 lb 1.6 oz)   Height: 149.9 cm (59.02\")   PainSc: 0-No pain     Current Status 2/2/2023   ECOG score 0       Physical Exam      CONSTITUTIONAL:  Vital signs reviewed.  No distress, looks comfortable.  EYES:  Conjunctivae and lids unremarkable.  PERRLA  EARS,NOSE,MOUTH,THROAT:  Ears and nose appear unremarkable.  Lips, teeth, gums appear unremarkable.  RESPIRATORY:  Normal respiratory effort.  Lungs clear to auscultation bilaterally.  BREAST: Right breast: No skin changes, no evidence of breast mass, no nipple discharge, no evidence of any right axillary adenopathy or right supraclavicular adenopathy  Left breast: No evidence of any skin changes, no evidence of any left breast mass and no evidence of left nipple discharge as well as no left axillary adenopathy or left " supraclavicular adenopathy.  CARDIOVASCULAR:  Normal S1, S2.  No murmurs rubs or gallops.  No significant lower extremity edema.  GASTROINTESTINAL: Abdomen appears unremarkable.  Nontender.  No hepatomegaly.  No splenomegaly.  LYMPHATIC:  No cervical, supraclavicular, axillary lymphadenopathy.  SKIN:  Warm.  No rashes.  PSYCHIATRIC:  Normal judgment and insight.  Normal mood and affect.    I have reexamined the patient and the results are consistent with the previously documented exam. Baylee Bailey MD       RECENT LABS:  Hematology WBC   Date Value Ref Range Status   02/02/2023 9.66 3.40 - 10.80 10*3/mm3 Final   12/10/2018 8.91 4.50 - 10.70 10*3/mm3 Final     RBC   Date Value Ref Range Status   02/02/2023 4.57 3.77 - 5.28 10*6/mm3 Final   12/10/2018 4.51 3.90 - 5.20 10*6/mm3 Final     Hemoglobin   Date Value Ref Range Status   02/02/2023 13.2 12.0 - 15.9 g/dL Final     Hematocrit   Date Value Ref Range Status   02/02/2023 39.7 34.0 - 46.6 % Final     Platelets   Date Value Ref Range Status   02/02/2023 354 140 - 450 10*3/mm3 Final        Assessment & Plan     1.  Tis N0, stage 0 DCIS of the left breast status post left mastectomy with sentinel lymph nodes on March 5, 2019.  She is here for follow-up to discuss the risks versus the benefits of chemoprevention.  · Screening mammogram had shown focal asymmetry and calcifications in the posterior one third region of the right breast and also in the middle one third region of the right breast.  There is calcification in the posterior one third superior region of the left breast.  There are prominent axillary nodes in the left axilla.  · Diagnostic mammogram, January 4, 2019 shows right breast was negative.,  Calcifications in the left breast 12-1 o'clock position, 7 cm from the nipple, biopsy recommended, calcifications at 1 o'clock position left breast 8 cm from the nipple, biopsy recommended fatty lymph nodes in the left axilla, aspiration recommended.  Subareolar  cyst aspiration suggested  · January 20, 2019 left breast cyst aspiration negative, left breast lymph node biopsy not done due to no suspicious lymph nodes on physical exam,        Left breast biopsy stereotactic x2, 12 to 1 o'clock position biopsy showedDCIS, with central necrosis, 6 mm ER +98%, LA +92%, Ki-67 3.18%, margins are clear  · Left breast biopsy at 130 position shows low to intermediate grade cribriform and solid ductal carcinoma in situ with central necrosis, largest focus 4 mm, no invasive carcinoma, ER 98%, LA 88%, Ki-67 4.4%, margins are clear. [place] lymph nodes negative.  · Patient is 2 weeks out of surgery and healing well. Discussed to start Tamoxifen once she is healed. She is willing to consider that after 2 years of tamoxifen we could potentially switch her to aromatase inhibitor therapy  · Labs show patient is per-menopausal.   · She started taking Tamoxifen in April, 2019 and has been tolerating it well. No hot flashes, joint pains or any other side effects. Discussed the side effects of Tamoxifen again today.   · Educated patient to see her Gyn annually to monitor for risk of uterine cancer and to follow up with her GYN if she experiences any abnormal vaginal bleeding.   · February 2, 2023: Tolerating tamoxifen very well with plans on completing it at 5 years  · Patient to complete April 2024    2.  Family history positive for cervical cancer in her sister who was 28 years old, breast cancer in paternal aunt in late 60s and lung cancer in her dad at age 62.  Genetic testing has been done. [employer] testing is negative for any mutations.    3.  History of multiple kidney stones, had 16 kidney stones, underwent 2 laser treatments and one lithotripsy so far.  She was told to not take calcium supplements.  She had sepsis with 1 of her kidney stones during admission to [facility].  Stable for now  · Stable no evidence of kidney stones  4.  Mild vitamin D deficiency.  · Patient to take  vitamin D      5. Osteoarthritis.     6.  Hypertension stable on lisinopril    Plan   1. Continue tamoxifen  2. Reviewed screening mammogram from June 2022 and that is negative  3. Patient is due for mammogram June 2023  4. She will complete tamoxifen April 2024  5. Follow-up in 6 months with labs    Monitoring high risk medication  Baylee Bailey MD         CC- Carley Miller, Vonnie Schroeder MD

## 2023-02-14 ENCOUNTER — OFFICE VISIT (OUTPATIENT)
Dept: FAMILY MEDICINE CLINIC | Facility: CLINIC | Age: 58
End: 2023-02-14
Payer: COMMERCIAL

## 2023-02-14 VITALS
RESPIRATION RATE: 16 BRPM | OXYGEN SATURATION: 99 % | HEIGHT: 59 IN | TEMPERATURE: 97.5 F | WEIGHT: 162 LBS | DIASTOLIC BLOOD PRESSURE: 90 MMHG | BODY MASS INDEX: 32.66 KG/M2 | HEART RATE: 79 BPM | SYSTOLIC BLOOD PRESSURE: 128 MMHG

## 2023-02-14 DIAGNOSIS — M47.812 CERVICAL SPONDYLOSIS: ICD-10-CM

## 2023-02-14 DIAGNOSIS — F41.9 ANXIETY: ICD-10-CM

## 2023-02-14 DIAGNOSIS — M75.121 NONTRAUMATIC COMPLETE TEAR OF RIGHT ROTATOR CUFF: ICD-10-CM

## 2023-02-14 DIAGNOSIS — G89.29 CHRONIC NECK PAIN: Primary | ICD-10-CM

## 2023-02-14 DIAGNOSIS — M54.2 CHRONIC NECK PAIN: Primary | ICD-10-CM

## 2023-02-14 DIAGNOSIS — H10.13 ALLERGIC CONJUNCTIVITIS OF BOTH EYES: ICD-10-CM

## 2023-02-14 DIAGNOSIS — I10 ESSENTIAL HYPERTENSION: ICD-10-CM

## 2023-02-14 PROCEDURE — 99214 OFFICE O/P EST MOD 30 MIN: CPT | Performed by: NURSE PRACTITIONER

## 2023-02-14 RX ORDER — HYDROXYZINE HYDROCHLORIDE 25 MG/1
TABLET, FILM COATED ORAL
Qty: 180 TABLET | Refills: 3 | Status: SHIPPED | OUTPATIENT
Start: 2023-02-14

## 2023-02-14 RX ORDER — IBUPROFEN 800 MG/1
800 TABLET ORAL EVERY 8 HOURS PRN
Qty: 180 TABLET | Refills: 3 | Status: SHIPPED | OUTPATIENT
Start: 2023-02-14

## 2023-02-14 RX ORDER — LISINOPRIL AND HYDROCHLOROTHIAZIDE 25; 20 MG/1; MG/1
1 TABLET ORAL DAILY
Qty: 90 TABLET | Refills: 3 | Status: SHIPPED | OUTPATIENT
Start: 2023-02-14

## 2023-02-14 RX ORDER — KETOTIFEN FUMARATE 0.35 MG/ML
1 SOLUTION/ DROPS OPHTHALMIC 2 TIMES DAILY
Qty: 15 ML | Refills: 3 | Status: SHIPPED | OUTPATIENT
Start: 2023-02-14

## 2023-02-14 NOTE — PROGRESS NOTES
Subjective   Mason Parada is a 57 y.o. female.     History of Present Illness    Pain (All over, she aches all over.  It is starting to effect her daily life.)   wants a bone scan (Pt has been on estrogen for many years, is having bone pain)  Answers for HPI/ROS submitted by the patient on 2/14/2023  Please describe your symptoms.: Deep achey pain in arms legs and neck, bone pain  Have you had these symptoms before?: Yes  How long have you been having these symptoms?: 5-7 days  Please list any medications you are currently taking for this condition.: 800mg Ibuprofen  Please describe any probable cause for these symptoms. : No idea- stopped taking Tamoxifen in November of 2022- that helped but now it is back.  What is the primary reason for your visit?: Other    Discussed with her that Tamoxifen should not cause any decrease in bone density and screening Dexa scan based on just Tamoxifen treatment alone is not recommended.  She saw Dr. Zendejas for chronic neck pain and was sent for PT. She has done PT multiple times with no overall improvement but will try dry needling. She has not yet scheduled follow up with another provider in that office for follow up since Dr. Zendejas retired.  She is still taking ibuprofen 800 mg as needed.  She also needs her regular medications resent to mail order pharmacy.   The following portions of the patient's history were reviewed and updated as appropriate: allergies, current medications, past family history, past medical history, past social history, past surgical history and problem list.    Review of Systems   Constitutional: Negative for unexpected weight change.   Respiratory: Negative for shortness of breath.    Cardiovascular: Negative for chest pain and palpitations.   Musculoskeletal: Positive for neck pain and neck stiffness.   Psychiatric/Behavioral: Negative for behavioral problems.       Objective   Physical Exam  Vitals and nursing note reviewed.   Constitutional:        Appearance: Normal appearance. She is well-developed.   Cardiovascular:      Rate and Rhythm: Normal rate.   Pulmonary:      Effort: Pulmonary effort is normal.   Neurological:      Mental Status: She is alert and oriented to person, place, and time.   Psychiatric:         Mood and Affect: Mood normal.         Behavior: Behavior normal.         Thought Content: Thought content normal.         Judgment: Judgment normal.         Assessment & Plan   Diagnoses and all orders for this visit:    1. Chronic neck pain (Primary)  -     Ambulatory Referral to Neurosurgery    2. Essential hypertension  -     lisinopril-hydrochlorothiazide (PRINZIDE,ZESTORETIC) 20-25 MG per tablet; Take 1 tablet by mouth Daily.  Dispense: 90 tablet; Refill: 3    3. Allergic conjunctivitis of both eyes  -     ketotifen (ZADITOR) 0.025 % ophthalmic solution; Administer 1 drop to both eyes 2 (Two) Times a Day.  Dispense: 15 mL; Refill: 3    4. Nontraumatic complete tear of right rotator cuff  -     ibuprofen (ADVIL,MOTRIN) 800 MG tablet; Take 1 tablet by mouth Every 8 (Eight) Hours As Needed for Mild Pain.  Dispense: 180 tablet; Refill: 3    5. Anxiety  -     hydrOXYzine (ATARAX) 25 MG tablet; Take 2-3 tablets at bedtime as needed for sleep  Dispense: 180 tablet; Refill: 3    6. Cervical spondylosis  -     Ambulatory Referral to Neurosurgery

## 2023-02-24 ENCOUNTER — PATIENT ROUNDING (BHMG ONLY) (OUTPATIENT)
Dept: NEUROSURGERY | Facility: CLINIC | Age: 58
End: 2023-02-24
Payer: COMMERCIAL

## 2023-02-24 ENCOUNTER — OFFICE VISIT (OUTPATIENT)
Dept: NEUROSURGERY | Facility: CLINIC | Age: 58
End: 2023-02-24
Payer: COMMERCIAL

## 2023-02-24 VITALS — WEIGHT: 160 LBS | HEIGHT: 59 IN | BODY MASS INDEX: 32.25 KG/M2 | RESPIRATION RATE: 16 BRPM

## 2023-02-24 DIAGNOSIS — M54.12 CERVICAL RADICULOPATHY: ICD-10-CM

## 2023-02-24 DIAGNOSIS — G95.0 SYRINX: ICD-10-CM

## 2023-02-24 DIAGNOSIS — M50.30 DDD (DEGENERATIVE DISC DISEASE), CERVICAL: Primary | ICD-10-CM

## 2023-02-24 DIAGNOSIS — M48.02 CERVICAL STENOSIS OF SPINE: ICD-10-CM

## 2023-02-24 PROCEDURE — 99204 OFFICE O/P NEW MOD 45 MIN: CPT | Performed by: NEUROLOGICAL SURGERY
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
## 2023-03-02 ENCOUNTER — TELEPHONE (OUTPATIENT)
Dept: FAMILY MEDICINE CLINIC | Facility: CLINIC | Age: 58
End: 2023-03-02
Payer: COMMERCIAL

## 2023-03-02 NOTE — TELEPHONE ENCOUNTER
Caller: Mason Parada    Relationship: Self    Best call back number: [phone]    What is the best time to reach you: ANY TIME    Who are you requesting to speak with (clinical staff, provider,  specific staff member): LANE FONG OR MA      What was the call regarding: PATIENT IS REQUESTING A CALL BACK FROM PROVIDER OR MA TO DISCUSS RECENT MY CHART MESSAGE.

## 2023-03-02 NOTE — TELEPHONE ENCOUNTER
I talked with pt. The MRI is not until the end of the month she is wanting to know if you can do anything else about this

## 2023-03-24 ENCOUNTER — HOSPITAL ENCOUNTER (OUTPATIENT)
Dept: MRI IMAGING | Facility: HOSPITAL | Age: 58
Discharge: HOME | End: 2023-03-24
Payer: COMMERCIAL

## 2023-03-24 ENCOUNTER — APPOINTMENT (OUTPATIENT)
Dept: OTHER | Facility: HOSPITAL | Age: 58
End: 2023-03-24
Payer: COMMERCIAL

## 2023-03-24 DIAGNOSIS — G95.0 SYRINX: ICD-10-CM

## 2023-03-24 DIAGNOSIS — Z09 FOLLOW-UP EXAM: ICD-10-CM

## 2023-03-24 PROCEDURE — A9577 INJ MULTIHANCE: HCPCS | Performed by: NEUROLOGICAL SURGERY

## 2023-03-24 PROCEDURE — 0 GADOBENATE DIMEGLUMINE 529 MG/ML SOLUTION: Performed by: NEUROLOGICAL SURGERY

## 2023-03-24 PROCEDURE — 72158 MRI LUMBAR SPINE W/O & W/DYE: CPT

## 2023-03-24 PROCEDURE — 72157 MRI CHEST SPINE W/O & W/DYE: CPT

## 2023-03-24 PROCEDURE — 72156 MRI NECK SPINE W/O & W/DYE: CPT

## 2023-03-24 RX ADMIN — GADOBENATE DIMEGLUMINE 15 ML: 529 INJECTION, SOLUTION INTRAVENOUS at 09:43

## 2023-03-27 ENCOUNTER — TELEPHONE (OUTPATIENT)
Dept: NEUROSURGERY | Facility: CLINIC | Age: 58
End: 2023-03-27
Payer: COMMERCIAL

## 2023-03-27 NOTE — TELEPHONE ENCOUNTER
"Caller: Mason Parada    Relationship: Self    Best call back number: [phone]    Caller requesting test results: SELF    What test was performed:  MRI CSPINE W CONTRAST 3-23-23  MRI TSPINE W/WO CONTRAST 3-23-23  MRI LSPINE W/WO CONTRAST 3-23-23    Where was the test performed: [facility]    Additional notes: PATIENT HAS BEEN SCHEDULED FOR 3-MONTH FOLLOW UP AND IS SCHEDULING PHYS THERAPY & PAIN MANAGEMENT INJECTIONS ACCORDING TO PLAN OF CARE.     SHE HAD HER MRI IMAGING DONE ON 3/23/23 AND IS REQUESTING IT BE REVIEWED SOONER THAN FOLLOW UP APPT FOR ANY URGENCY/IF FOLLOW UP APPOINTMENT NEEDS TO BE MOVED UP TO A SOONER DATE THAN CURRENT PLANNED \"Follow up in 3 months on/around 5-.\" (following cons. Treatments and 3x MRI new imaging).    PLEASE CONTACT PATIENT W/ MRI RESULTS AND ADVISE IF NEEDING TO BE SEEN SOONER OR ABLE TO CONTINUE CONSERVATIVE TREATMENTS AND KEEP FOLLOW UP AS SCHEDULED. THANK YOU.     "

## 2023-03-30 NOTE — PROGRESS NOTES
[facility]     Referring Provider: No ref. provider found     Chief complaint: Routine followup DCIS     HPI:   2/4/19: Saw Dr. Cat   Ms. Mason Parada is a 54 yo woman, seen at the request of Dr. Julia Whitlock, for a new diagnosis of left breast DCIS. This was initially detected as an imaging abnormality. Her work-up is detailed in the oncologic history below. She denies any breast lumps, pain, skin changes, or nipple discharge. She denies any prior history of abnormal mammograms or breast biopsies. She has a family history of breast cancer in a paternal aunt, diagnosed in her late 60's. She has a family history of ovarian cancer in her sister, diagnosed at age 34.      3/21/19: Saw Dr. Cat   She underwent left mastectomy & SLNB with TE reconstruction on 3/5/19. See surgery & pathology details below in oncologic history. She has been recovering well and has no complaints. She has already seen Dr. Ding back and had one drain removed. She has seen Dr. Bailey and discussed chemoprevention. She will be starting tamoxifen in a few weeks.      6/20/19: Saw Dr. Cat   She returns today for scheduled follow-up. I have reviewed the interval records since her last visit. She underwent left implant exchange and right augmentation and mastopexy with Dr. Ding on 6/11/19. She has been recovering from this well. She is seeing Dr. Bailey and has continued on tamoxifen for chemoprevention. She is tolerating this reasonably well. She has occasional headaches and restless legs in the evening, which she is not sure whether or not is attributed to the tamoxifen or her other medications. She is considering switching the timing of her tamoxifen dose to the morning. She is still being followed by physical therapy. She denies any additional complaints.     12/2/19 Saw Dr. Cat   She returns today for scheduled follow-up. I have reviewed the interval records since her last visit. She remains on tamoxifen and  is still tolerating this fairly well. She has been discharged from physical therapy. She underwent right screening mammogram prior to her appointment which showed an asymmetry requiring additional evaluation. She denies any new complaints.    4/10/23 Interval History  Patient presenting for routine follow-up.  She last saw oncology on February 2, 2023.  Pt states that she hasn't taken Tamoxifen since Oct. bc of leg cramps.  No new therapy has been suggested.  Has no new breast complaints or concerns. Due for right screening mammo in June.    Oncology/Hematology History Overview Note   2008, Screening MMG: Negative, per pt report.      Ductal carcinoma in situ (DCIS) of left breast   10/31/2018 Initial Diagnosis    Ductal carcinoma in situ (DCIS) of left breast     11/1/2018 Imaging    Screening MMG with Celso ([facility]): Heterogeneously dense.  1. There is a focal asymmetry and calcifications seen in the posterior one-third central region of the right breast.  2. There is a focal asymmetry with associated calcifications seen in the middle one-third central region of the right breast.  3. There are calcifications seen in the posterior one-third superior region of the left breast.  4. There are prominent axillary lymph nodes in the left axilla.  BI-RADS 0: Incomplete.     1/4/2019 Imaging    Bilateral Diagnostic MMG with Celso & Bilateral US ([facility]):   MMG: Heterogeneously dense.  1. On the present examination, focal asymmetry in the right breast, central does not persist. The asymmetry noted on the recent screening mammogram resolves into stroma on additional views.  2. There are multiple amorphous and indistinct calcifications with segmental distribution in the middle one-third of the left breast at 12-1 o'clock, central located 7 cm from the nipple. These span approximately 2 cm. The asymmetry noted on the recent mammogram resolves into stroma on additional views.  3. There are several punctate and indistinct calcifications  with linear distribution in the posterior one-third 1:30 o'clock region of the left breast located 8 cm from the nipple. These span approximately 1 cm.  4. There are a few small axillary lymph nodes in the left axilla.  US:  1. In the central, posterior one third region of the right breast, there is no evidence of any solid mass or abnormal cystic elements.  2. In the central, middle one-third of the left breast at 12-1 o'clock, there is no discrete solid or suspicious sonographic abnormalities.   4. Ultrasound is suggestive of a few fatty lymph nodes. The largest measures 19 x 8 x 12 mm. This demonstrates prominent cortex measuring up to 3 mm. The remaining smaller nodes demonstrate normal morphology and size.  5. There is an oval cluster of cyst with partially defined margins measuring 10 x 4 x 7 mm seen in the sub-areolar region of the left breast. Internal echotexture is heterogeneous.   6. There are a few small simple cyst seen in the left breast. These measure less than 1 cm.  IMPRESSION:  1. Area in the right breast is benign-negative.  2. Calcifications in the middle one third of the left breast at 12 to 1:00, centrally located 7 cm from the nipple are suspicious. Biopsy is recommended.  3. Calcifications in the posterior one third 1:00 region of the left breast located 8 cm from the nipple are suspicious. Biopsy is recommended.  4. Fatty lymph nodes in the left axilla are suspicious. Aspiration is recommended.  5. Cluster of cysts in the subareolar region of the left breast is suspicious. Cyst aspiration is recommended.  6. Simple cysts in the left breast are benign-negative.  BI-RADS 4B: Suspicious.      1/28/2019 Biopsy    Left breast, subareolar, Cyst Aspiration ([facility]):   Left subareolar breast tissue, ultrasound guided aspiration, thin prep:   No malignant cells identified. Cytologic features consistent with benign fibrocystic changes.  -1/4 cc of blood-tinged fluid was aspirated. The walls of the cyst  "collapsed. There was partial sonographic resolution. Concordant.    Left axillary lymph node FNA ([facility]):  -Procedure not performed, due to no suspicious lymph nodes on 1/28/19 exam.     1/28/2019 Biopsy    Left breast, Stereotactic Biopsy x 2 ([facility]):     1. Central Left breast Tissue, 12-1:00, \"tophat\" clip:  Intermediate Grade Cribriform Ductal Carcinoma in Situ with Central Necrosis, Microcalcification and Lobular Extension.   Largest focus of DCIS measuring 6 mm.   No invasive carcinoma identified.   Fibrofatty breast tissue also showing florid intraductal hyperplasia of the usual type, cystically dilated and ectatic ducts, patchy mild chronic stromal inflammation, and stromal microcalcifications.     ER+ (98.96%, strong)  SC+ 92.91%, strong)  Ki67 3.18%    2. Left Breast Tissue, 1:30, \"minicork\" clip:   Low to Intermediate Grade Cribriform and Solid Ductal Carcinoma in Situ with Central Necrosis, Microcalcifications and Lobular Extension.   Largest focus of DCIS measuring 4 mm.   No invasive carcinoma identified.   Fibrofatty breast tissue also showing columnar cell change, patchy mild chronic stromal inflammation and stromal microcalcifications.     ER+ (98.04%, strong)  SC+ (88.62%, strong)  Ki67 4.4%     1/31/2019 Imaging    Breast MRI ([facility]):   Within the middle third of the left breast at the 12-o'clock position on the order of 5.5 cm from the nipple there is a metallic clip seen within a postbiopsy cavity that measures on the order of 1.4 cm in the superior to inferior dimension, 3.2 cm in anterior to posterior dimension and 3.1 cm in medial to lateral dimension. A metallic clip is seen within the central portion of the cavity. The metallic clip represents the T-shaped metallic clip seen on the postbiopsy mammogram. This represents a biopsy-proven site of malignancy. Clumped enhancement that has an aggregate measures on the order of 1.8 cm in superior to inferior dimension, 1.4 cm in anterior to posterior " dimension and 1.3 cm in the medial to lateral dimension, as seen along the lateral margin of the T-shaped metallic clip. The postbiopsy mammogram is somewhat difficult to evaluate due to postbiopsy change, but calcifications are seen within this region.  In the posterior one-third of the upper quadrant of the left breast centered at the 1-o'clock position on the order of 7.7 cm from the nipple there is a metallic clip which represents the barrel-shaped metallic clip. This metallic clip is located on the order of 1.3 cm superior to clumped enhancement that measures on the order of 1.1 cm in anterior to posterior dimension, 0.7 cm in the superior to inferior dimension and 0.7 cm in the medial to lateral dimension. This represents a biopsy-proven site of malignancy. Mammographically on the postbiopsy mammogram, there is a suggestion of some microcalcifications seen inferior to this barrel-shaped metallic clip that appear to correspond to the enhancement seen in this region. No other areas of abnormal enhancement or morphology are seen within the left breast. There is no evidence for left axillary adenopathy. There are no findings suspicious for malignancy in the right breast.  BI-RADS 6: Known biopsy-proven malignancy.      2/4/2019 Genetic Testing    [employer] Common Hereditary Cancers & Breast Cancer STAT Panels (47 genes):  Negative     3/5/2019 Surgery    Left skin-sparing mastectomy with SLNB & prepectoral TE reconstruction     1. Lymph Node, Berry Node #1 (two levels):  Single benign lymph node.     2. Lymph Node, Berry Node  #2, Excision (two levels):  Single benign lymph node.     3. Breast, Simple Mastectomy, Skin Sparing: Multifocal ductal carcinoma in-situ.               A. The ductal carcinoma in-situ is of intermediate nuclear grade.               B. The ductal carcinoma in-situ has comedo, micropapillary, solid and cribriform architecture.               C. The ductal carcinoma in-situ has comedo type  expansive necrosis.               D. The ductal carcinoma in-situ is multifocal with the largest confluent area measuring 45 mm.                    Additional separate foci include two foci up to 20 mm , and one up to 23 mm (ductal                   carcinoma is in fourteen of thirty-one blocks (14/35).               F. The ductal carcinoma in-situ comes within 20 mm of the superior margin, 100 mm of the inferior margin, 40                    mm of the medial margin, 50 mm of the lateral margin, 18 mm of the anterior margin and 15 mm of the posterior margin (closest margin posterior 15 mm away).               F. Hormone receptors were performed on prior tissue and were ER and IN positive.     4/1/2019 -  Hormonal Therapy    Tamoxifen 20 mg daily as chemoprevention     6/11/2019 Surgery    1.  Left breast removal of tissue expander with placement of permanent breast prosthesis, Allergan [id number] cohesive silicone implant  2.  Autologous fat grafting 45 cc to the left breast  3.  Augmentation and mastopexy to the right breast for symmetry, Allergan  cc silicone implant     11/25/2019 Imaging    Right Screening MMG with Celso ([facility]):  Heterogeneously dense. Patient has had interval implant placement. There is an asymmetry measuring 4 mm seen in the MLO view only seen in the posterior one third central region of the right breast at the edge of the film.  BI-RADS 0: Incomplete.     12/9/2019 Imaging    Right Diagnostic MMG with Celso & Right Breast US ([facility]):  MMG:  On the present examination, asymmetry in the right breast, central does not persist. With current diagnostic images, no worrisome mammographic abnormality is seen. The edge of the implant is seen.  US:  There are no suspicious masses, areas of focal shadowing or distortion seen.  BI-RADS 2: Benign.     6/16/2022 Imaging    SCREENING RIGHT-SIDED MAMMOGRAM WITH R2 COMPUTERIZED ASSISTED DETECTION  AND DIGITAL TOMOSYNTHESIS     HISTORY: Screening. Previous  left mastectomy for breast cancer.     FINDINGS: Standard images and R2 COMPUTERIZED ASSISTED DETECTION were  obtained followed by digital Tomosynthesis. Pinch back views were also  performed to evaluate subpectoral breast implant which appears intact  and unchanged from 11/25/2019. There is scattered fibroglandular density  in the right breast. There are no findings to suggest malignancy.     CONCLUSION: Benign right-sided mammogram with implant in place and  showing no change from 11/25/2019. BI-RADS 2. Benign finding.         Review of Systems:  See interval history.       Medications:    Current Outpatient Medications:   •  famotidine (PEPCID) 40 MG tablet, Take 40 mg by mouth Every Morning., Disp: , Rfl:   •  hydrOXYzine (ATARAX) 25 MG tablet, Take 2-3 tablets at bedtime as needed for sleep, Disp: 180 tablet, Rfl: 3  •  ibuprofen (ADVIL,MOTRIN) 800 MG tablet, Take 1 tablet by mouth Every 8 (Eight) Hours As Needed for Mild Pain., Disp: 180 tablet, Rfl: 3  •  ketotifen (ZADITOR) 0.025 % ophthalmic solution, Administer 1 drop to both eyes 2 (Two) Times a Day., Disp: 15 mL, Rfl: 3  •  lisinopril-hydrochlorothiazide (PRINZIDE,ZESTORETIC) 20-25 MG per tablet, Take 1 tablet by mouth Daily., Disp: 90 tablet, Rfl: 3  •  MAGNESIUM PO, Take  by mouth., Disp: , Rfl:   •  propranolol (INDERAL) 20 MG tablet, Take 1 tablet by mouth 3 (Three) Times a Day As Needed (anxiety)., Disp: 270 tablet, Rfl: 3  •  VITAMIN D PO, Take  by mouth., Disp: , Rfl:   No current facility-administered medications for this visit.    Facility-Administered Medications Ordered in Other Visits:   •  Chlorhexidine Gluconate Cloth 2 % pads 1 each, 1 each, Apply externally, Take As Directed, Hari Ventura MD      Allergies   Allergen Reactions   • Hydrocodone Itching   • Sulfa Antibiotics Itching     LIP swelling       Family History   Problem Relation Age of Onset   • Hypertension Mother    • Other Mother         Blood clots   • Emphysema Father     • Lung cancer Father 62   • Bone cancer Father 62   • Lupus Sister    • Cancer Sister 28        Cervical   • Other Brother         kidney stone   • Heart disease Maternal Grandfather 55   • Diabetes Maternal Grandfather    • Hypertension Maternal Grandfather    • Pancreatic cancer Paternal Grandmother 68   • Other Paternal Grandfather         kidney stone   • Stroke Maternal Grandmother    • Breast cancer Paternal Aunt         late 60's   • Ovarian cancer Sister 34   • Cancer Other    • Malig Hyperthermia Neg Hx        PHYSICAL EXAMINATION:   There were no vitals filed for this visit.  ECOG 0 - Asymptomatic  General: NAD, well appearing  Psych: a&o x 3, normal mood and affect  Eyes: EOMI, no scleral icterus  ENMT: neck supple without masses or thyromegaly, mucus membranes moist  MSK: normal gait, normal ROM in bilateral shoulders  Lymph nodes: no cervical, supraclavicular or axillary lymphadenopathy  Breast:   Right: Well-healed mastopexy incision with implant in place. Scars are soft. No masses.  Left: Sp mastectomy with implant in place. Soft, well-healed IMF incision and soft, well-healed central mastectomy scar. No masses.      Assessment:  57 y.o. F with a diagnosis of multifocal left breast DCIS: intermediate grade, ER/AK+. She is sp left [facility]SM & SLNB on 3/5/29, pTis (largest focus 4.5 cm). She is on tamoxifen for chemoprevention. She is sp left implant exchange and right augmentation and mastopexy with Dr. Ding on 6/11/19.  -She has a right breast asymmetry on recent screening MMG.    Plan:  -right screening mammo in June  - exam in 1 year   -Continue f/u with Dr. Ding.  -Continue f/u with Dr. Bailey.- ask about tamoxifen   -She was instructed to call with any questions, concerns or changes on BSE.    LANE Mack      CC:  No ref. provider found  LANE De Dios APRN

## 2023-03-31 ENCOUNTER — HOSPITAL ENCOUNTER (OUTPATIENT)
Dept: GENERAL RADIOLOGY | Facility: HOSPITAL | Age: 58
Discharge: HOME | End: 2023-03-31
Payer: COMMERCIAL

## 2023-03-31 ENCOUNTER — ANESTHESIA (OUTPATIENT)
Dept: PAIN MEDICINE | Facility: HOSPITAL | Age: 58
End: 2023-03-31
Payer: COMMERCIAL

## 2023-03-31 ENCOUNTER — HOSPITAL ENCOUNTER (OUTPATIENT)
Dept: PAIN MEDICINE | Facility: HOSPITAL | Age: 58
Discharge: HOME | End: 2023-03-31
Payer: COMMERCIAL

## 2023-03-31 ENCOUNTER — ANESTHESIA EVENT (OUTPATIENT)
Dept: PAIN MEDICINE | Facility: HOSPITAL | Age: 58
End: 2023-03-31
Payer: COMMERCIAL

## 2023-03-31 VITALS
BODY MASS INDEX: 31.25 KG/M2 | WEIGHT: 155 LBS | SYSTOLIC BLOOD PRESSURE: 117 MMHG | OXYGEN SATURATION: 99 % | DIASTOLIC BLOOD PRESSURE: 81 MMHG | RESPIRATION RATE: 16 BRPM | HEIGHT: 59 IN | HEART RATE: 80 BPM | TEMPERATURE: 98.4 F

## 2023-03-31 DIAGNOSIS — R52 PAIN: ICD-10-CM

## 2023-03-31 DIAGNOSIS — M47.812 CERVICAL SPONDYLOSIS WITHOUT MYELOPATHY: Primary | ICD-10-CM

## 2023-03-31 PROCEDURE — 25010000002 METHYLPREDNISOLONE PER 80 MG: Performed by: ANESTHESIOLOGY

## 2023-03-31 PROCEDURE — 25010000002 MIDAZOLAM PER 1 MG: Performed by: ANESTHESIOLOGY

## 2023-03-31 PROCEDURE — 77003 FLUOROGUIDE FOR SPINE INJECT: CPT

## 2023-03-31 RX ORDER — MIDAZOLAM HYDROCHLORIDE 1 MG/ML
1 INJECTION INTRAMUSCULAR; INTRAVENOUS AS NEEDED
Status: DISCONTINUED | OUTPATIENT
Start: 2023-03-31 | End: 2023-04-01 | Stop reason: HOSPADM

## 2023-03-31 RX ORDER — SODIUM CHLORIDE 0.9 % (FLUSH) 0.9 %
10 SYRINGE (ML) INJECTION EVERY 12 HOURS SCHEDULED
Status: DISCONTINUED | OUTPATIENT
Start: 2023-03-31 | End: 2023-04-01 | Stop reason: HOSPADM

## 2023-03-31 RX ORDER — SODIUM CHLORIDE 0.9 % (FLUSH) 0.9 %
1-10 SYRINGE (ML) INJECTION AS NEEDED
Status: DISCONTINUED | OUTPATIENT
Start: 2023-03-31 | End: 2023-04-01 | Stop reason: HOSPADM

## 2023-03-31 RX ORDER — FENTANYL CITRATE 50 UG/ML
50 INJECTION, SOLUTION INTRAMUSCULAR; INTRAVENOUS AS NEEDED
Status: DISCONTINUED | OUTPATIENT
Start: 2023-03-31 | End: 2023-04-01 | Stop reason: HOSPADM

## 2023-03-31 RX ORDER — LIDOCAINE HYDROCHLORIDE 10 MG/ML
0.5 INJECTION, SOLUTION INFILTRATION; PERINEURAL ONCE AS NEEDED
Status: DISCONTINUED | OUTPATIENT
Start: 2023-03-31 | End: 2023-04-01 | Stop reason: HOSPADM

## 2023-03-31 RX ORDER — METHYLPREDNISOLONE ACETATE 80 MG/ML
80 INJECTION, SUSPENSION INTRA-ARTICULAR; INTRALESIONAL; INTRAMUSCULAR; SOFT TISSUE ONCE
Status: COMPLETED | OUTPATIENT
Start: 2023-03-31 | End: 2023-03-31

## 2023-03-31 RX ORDER — SODIUM CHLORIDE 0.9 % (FLUSH) 0.9 %
10 SYRINGE (ML) INJECTION AS NEEDED
Status: DISCONTINUED | OUTPATIENT
Start: 2023-03-31 | End: 2023-04-01 | Stop reason: HOSPADM

## 2023-03-31 RX ORDER — LIDOCAINE HYDROCHLORIDE 10 MG/ML
1 INJECTION, SOLUTION INFILTRATION; PERINEURAL ONCE AS NEEDED
Status: DISCONTINUED | OUTPATIENT
Start: 2023-03-31 | End: 2023-04-01 | Stop reason: HOSPADM

## 2023-03-31 RX ORDER — SODIUM CHLORIDE 9 MG/ML
40 INJECTION, SOLUTION INTRAVENOUS AS NEEDED
Status: DISCONTINUED | OUTPATIENT
Start: 2023-03-31 | End: 2023-04-01 | Stop reason: HOSPADM

## 2023-03-31 RX ADMIN — MIDAZOLAM 2 MG: 1 INJECTION INTRAMUSCULAR; INTRAVENOUS at 09:24

## 2023-03-31 RX ADMIN — METHYLPREDNISOLONE ACETATE 80 MG: 80 INJECTION, SUSPENSION INTRA-ARTICULAR; INTRALESIONAL; INTRAMUSCULAR; SOFT TISSUE at 09:26

## 2023-03-31 NOTE — ANESTHESIA PROCEDURE NOTES
PAIN Epidural block    Pre-sedation assessment completed: 3/31/2023 9:09 AM    Patient reassessed immediately prior to procedure    Patient location during procedure: pain clinic  Start Time: 3/31/2023 9:09 AM  Stop Time: 3/31/2023 9:29 AM  Indication:procedure for pain  Performed By  Anesthesiologist: Vicenta Patel MD  Preanesthetic Checklist  Completed: patient identified, IV checked, site marked, risks and benefits discussed, surgical consent, monitors and equipment checked, pre-op evaluation and timeout performed  Additional Notes  Fluoro used.    Sedation [id number]  Dx: cervical spondylosis with radiculopathy.    Prep:  Pt Position:prone  Sterile Tech:cap, gloves, mask and sterile barrier  Prep:chlorhexidine gluconate and isopropyl alcohol  Monitoring:blood pressure monitoring, continuous pulse oximetry and EKG  Procedure:Sedation: yes     Approach:midline  Guidance: fluoroscopy  Location:cervical  Level:6-7  Needle Type:Tuohy  Needle Gauge:20  Aspiration:negative  Medications:  Preservative Free Saline:3mL  Comments:No dye indicatedDepomedrol:80  Post Assessment:  Dressing:occlusive dressing applied  Pt Tolerance:patient tolerated the procedure well with no apparent complications  Complications:no
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
## 2023-04-10 ENCOUNTER — OFFICE VISIT (OUTPATIENT)
Dept: SURGERY | Facility: CLINIC | Age: 58
End: 2023-04-10
Payer: COMMERCIAL

## 2023-04-10 ENCOUNTER — DOCUMENTATION (OUTPATIENT)
Dept: SURGERY | Facility: CLINIC | Age: 58
End: 2023-04-10
Payer: COMMERCIAL

## 2023-04-10 VITALS
SYSTOLIC BLOOD PRESSURE: 138 MMHG | HEIGHT: 59 IN | DIASTOLIC BLOOD PRESSURE: 80 MMHG | WEIGHT: 155 LBS | BODY MASS INDEX: 31.25 KG/M2

## 2023-04-10 DIAGNOSIS — Z12.31 ENCOUNTER FOR SCREENING MAMMOGRAM FOR MALIGNANT NEOPLASM OF BREAST: ICD-10-CM

## 2023-04-10 DIAGNOSIS — D05.12 DUCTAL CARCINOMA IN SITU (DCIS) OF LEFT BREAST: Primary | ICD-10-CM

## 2023-04-10 PROCEDURE — 99214 OFFICE O/P EST MOD 30 MIN: CPT | Performed by: NURSE PRACTITIONER

## 2023-04-10 RX ORDER — PROPRANOLOL HCL 60 MG
60 CAPSULE, EXTENDED RELEASE 24HR ORAL DAILY
COMMUNITY
Start: 2023-04-05

## 2023-04-10 RX ORDER — METFORMIN HYDROCHLORIDE 750 MG/1
750 TABLET, EXTENDED RELEASE ORAL DAILY
COMMUNITY
Start: 2023-04-05

## 2023-04-12 ENCOUNTER — OFFICE VISIT (OUTPATIENT)
Dept: ORTHOPEDIC SURGERY | Facility: CLINIC | Age: 58
End: 2023-04-12
Payer: COMMERCIAL

## 2023-04-12 VITALS — TEMPERATURE: 97.3 F | HEIGHT: 59 IN | BODY MASS INDEX: 31.31 KG/M2 | WEIGHT: 155.3 LBS

## 2023-04-12 DIAGNOSIS — M25.511 ACUTE PAIN OF RIGHT SHOULDER: ICD-10-CM

## 2023-04-12 DIAGNOSIS — Z96.611 STATUS POST REPLACEMENT OF RIGHT SHOULDER JOINT: Primary | ICD-10-CM

## 2023-04-12 NOTE — PROGRESS NOTES
"Chief Complaint:  Right shoulder pain    HPI:  Ms. Parada comes in today for evaluation of right shoulder pain.  She is 18 months out from her reverse total shoulder replacement with Dr. Ventura.  Denies injury to the shoulder.  Reports her shoulder has been doing very well she has been very functional since her surgery.  She first noticed a catching sensation localized in the deltoid region 1 week ago while riding in her 's truck.  Reports she had several catching occurrences over the weekend as well.  She tried Motrin, ice, and Tiger balm with mild improvement of symptoms.  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  Reports she does seem to guard the shoulder.  She has not kept up with her shoulder exercises learned in physical therapy.  She has been experiencing increased neck pain and is followed by Dr. Lal.  Reports she had her first cervical epidural injection on 3/31/23.      Vitals:    04/12/23 0815   Temp: 97.3 °F (36.3 °C)   TempSrc: Temporal   Weight: 70.4 kg (155 lb 4.8 oz)   Height: 149.9 cm (59\")     Exam:  Right shoulder is examined:  The incision is well healed.  No effusion.  No areas of focal tenderness.  FE: 170°.  ER 50°.  IR to approximately L1.  The arm is soft and nontender.  Good strength with elevation and abduction. Intact sensation to light touch.  Palpable radial pulse    Imaging:  AP, scapular Y and axillary views of the right shoulder are ordered and reviewed for evaluation of shoulder replacement. The x-rays demonstrate a well positioned, well aligned replacement without complicating factors noted. In comparison with previous films there has been no change.    Assessment:   1.  Acute right shoulder pain, unknown etiology  2.  History of right reverse total shoulder arthroplasty    Plan:   I do not find anything on exam or x-rays that would account for her right shoulder pain.  It is possible " she is having some radicular pain.  I encouraged her to gradually resume her exercise program to see if this helps.  I encouraged her to continue on with her epidural series as well.  If her symptoms persist or worsen, I recommended she follow-up with Dr. Ventura for further evaluation.  She verbalized understanding and agreed with this plan.    Arelis Mujica, APRN  04/12/2023

## 2023-04-13 ENCOUNTER — TREATMENT (OUTPATIENT)
Dept: PHYSICAL THERAPY | Facility: CLINIC | Age: 58
End: 2023-04-13
Payer: COMMERCIAL

## 2023-04-13 DIAGNOSIS — M50.30 DDD (DEGENERATIVE DISC DISEASE), CERVICAL: Primary | ICD-10-CM

## 2023-04-13 PROCEDURE — 97140 MANUAL THERAPY 1/> REGIONS: CPT | Performed by: PHYSICAL THERAPIST

## 2023-04-13 PROCEDURE — 97163 PT EVAL HIGH COMPLEX 45 MIN: CPT | Performed by: PHYSICAL THERAPIST

## 2023-04-13 PROCEDURE — 97014 ELECTRIC STIMULATION THERAPY: CPT | Performed by: PHYSICAL THERAPIST
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
## 2023-04-19 ENCOUNTER — TELEPHONE (OUTPATIENT)
Dept: PHYSICAL THERAPY | Facility: CLINIC | Age: 58
End: 2023-04-19

## 2023-04-21 ENCOUNTER — TREATMENT (OUTPATIENT)
Dept: PHYSICAL THERAPY | Facility: CLINIC | Age: 58
End: 2023-04-21
Payer: COMMERCIAL

## 2023-04-21 DIAGNOSIS — M25.511 ACUTE PAIN OF RIGHT SHOULDER: ICD-10-CM

## 2023-04-21 DIAGNOSIS — Z96.611 STATUS POST REVERSE TOTAL REPLACEMENT OF RIGHT SHOULDER: ICD-10-CM

## 2023-04-21 DIAGNOSIS — M50.30 DDD (DEGENERATIVE DISC DISEASE), CERVICAL: Primary | ICD-10-CM

## 2023-04-21 PROCEDURE — 97110 THERAPEUTIC EXERCISES: CPT | Performed by: PHYSICAL THERAPIST

## 2023-04-21 PROCEDURE — 97014 ELECTRIC STIMULATION THERAPY: CPT | Performed by: PHYSICAL THERAPIST

## 2023-04-21 PROCEDURE — 97140 MANUAL THERAPY 1/> REGIONS: CPT | Performed by: PHYSICAL THERAPIST

## 2023-04-21 PROCEDURE — 97112 NEUROMUSCULAR REEDUCATION: CPT | Performed by: PHYSICAL THERAPIST

## 2023-04-21 NOTE — PROGRESS NOTES
Physical Therapy Daily Treatment Note    Patient: Mason Parada  : 1965  Referring practitioner: Adam Lal IV, MD  Today's Date: 2023    VISIT#: 2      Subjective   Mason Parada reports: Doing well, feeling a little better, feels like she can turn her head a little more.       Objective     See Exercise, Manual, and Modality Logs for complete treatment.     Patient Education: provided HEP.    Assessment/Plan  Good response to session, requires intermittent cues to prevent scapular hiking due to increased bilateral upper trap tension. Good response to manual with less pain after, continued estim for muscle relaxation and pain reduction.     Progress per Plan of Care            Timed:         Manual Therapy:    12     mins  63535;     Therapeutic Exercise:    23     mins  45235;     Neuromuscular Alesia:    10    mins  36157;    Therapeutic Activity:          mins  47433;     Gait Training:           mins  41855;     Ultrasound:          mins  60377;    Ionto:                                   mins   32262  Self Care:                            mins   30961    Un-Timed:  Electrical Stimulation:    15     mins  19820 ( );  Dry Needling          mins self-pay  Traction          mins 39460  Re-Eval                               mins  59736    Timed Treatment:   45   mins   Total Treatment:     60   mins    Halle Blake, PT  Physical Therapist  [place] PT license #: [id number]  [place] PT license #: [id number]

## 2023-04-25 ENCOUNTER — TELEPHONE (OUTPATIENT)
Dept: PHYSICAL THERAPY | Facility: CLINIC | Age: 58
End: 2023-04-25

## 2023-04-25 NOTE — TELEPHONE ENCOUNTER
Caller: Mason Parada    Relationship: Self     What was the call regarding: IN HOSPITAL WITH KIDNEY STONE

## 2023-05-01 ENCOUNTER — TELEPHONE (OUTPATIENT)
Dept: PHYSICAL THERAPY | Facility: CLINIC | Age: 58
End: 2023-05-01

## 2023-05-12 ENCOUNTER — ANESTHESIA (OUTPATIENT)
Dept: PAIN MEDICINE | Facility: HOSPITAL | Age: 58
End: 2023-05-12
Payer: COMMERCIAL

## 2023-05-12 ENCOUNTER — HOSPITAL ENCOUNTER (OUTPATIENT)
Dept: PAIN MEDICINE | Facility: HOSPITAL | Age: 58
Discharge: HOME | End: 2023-05-12
Payer: COMMERCIAL

## 2023-05-12 ENCOUNTER — ANESTHESIA EVENT (OUTPATIENT)
Dept: PAIN MEDICINE | Facility: HOSPITAL | Age: 58
End: 2023-05-12
Payer: COMMERCIAL

## 2023-05-12 ENCOUNTER — HOSPITAL ENCOUNTER (OUTPATIENT)
Dept: GENERAL RADIOLOGY | Facility: HOSPITAL | Age: 58
Discharge: HOME | End: 2023-05-12
Payer: COMMERCIAL

## 2023-05-12 VITALS
RESPIRATION RATE: 14 BRPM | DIASTOLIC BLOOD PRESSURE: 78 MMHG | OXYGEN SATURATION: 96 % | HEART RATE: 59 BPM | SYSTOLIC BLOOD PRESSURE: 120 MMHG | TEMPERATURE: 98.6 F

## 2023-05-12 DIAGNOSIS — R52 PAIN: ICD-10-CM

## 2023-05-12 DIAGNOSIS — M47.812 CERVICAL SPONDYLOSIS WITHOUT MYELOPATHY: ICD-10-CM

## 2023-05-12 LAB — GLUCOSE BLDC GLUCOMTR-MCNC: 147 MG/DL (ref 70–130)

## 2023-05-12 PROCEDURE — 82948 REAGENT STRIP/BLOOD GLUCOSE: CPT

## 2023-05-12 PROCEDURE — 77003 FLUOROGUIDE FOR SPINE INJECT: CPT

## 2023-05-12 PROCEDURE — 25010000002 DEXAMETHASONE SODIUM PHOSPHATE 10 MG/ML SOLUTION: Performed by: ANESTHESIOLOGY

## 2023-05-12 RX ORDER — SODIUM CHLORIDE 0.9 % (FLUSH) 0.9 %
1-10 SYRINGE (ML) INJECTION AS NEEDED
Status: DISCONTINUED | OUTPATIENT
Start: 2023-05-12 | End: 2023-05-13 | Stop reason: HOSPADM

## 2023-05-12 RX ORDER — DEXAMETHASONE SODIUM PHOSPHATE 10 MG/ML
10 INJECTION, SOLUTION INTRAMUSCULAR; INTRAVENOUS ONCE
Status: COMPLETED | OUTPATIENT
Start: 2023-05-12 | End: 2023-05-12

## 2023-05-12 RX ORDER — MIDAZOLAM HYDROCHLORIDE 1 MG/ML
1 INJECTION INTRAMUSCULAR; INTRAVENOUS AS NEEDED
Status: DISCONTINUED | OUTPATIENT
Start: 2023-05-12 | End: 2023-05-13 | Stop reason: HOSPADM

## 2023-05-12 RX ORDER — LIDOCAINE HYDROCHLORIDE 10 MG/ML
1 INJECTION, SOLUTION INFILTRATION; PERINEURAL ONCE AS NEEDED
Status: DISCONTINUED | OUTPATIENT
Start: 2023-05-12 | End: 2023-05-13 | Stop reason: HOSPADM

## 2023-05-12 RX ORDER — FENTANYL CITRATE 50 UG/ML
50 INJECTION, SOLUTION INTRAMUSCULAR; INTRAVENOUS AS NEEDED
Status: DISCONTINUED | OUTPATIENT
Start: 2023-05-12 | End: 2023-05-13 | Stop reason: HOSPADM

## 2023-05-12 RX ADMIN — DEXAMETHASONE SODIUM PHOSPHATE 10 MG: 10 INJECTION, SOLUTION INTRAMUSCULAR; INTRAVENOUS at 08:35

## 2023-05-12 NOTE — DISCHARGE INSTRUCTIONS
EPIDURAL STEROID INJECTION          An epidural steroid injection is a shot of steroid medicine and numbing medicine that is given into the space between the spinal cord and the bones of the back (epidural space).  The injection helps relieve pain by an irritated or swollen nerve root.    TELL YOUR HEALTH CARE PROVIDER ABOUT:  Any allergies you have  All medicines you are taking including any over the counter medicines  Any blood disorders you have  Any surgeries you have had  Any medical conditions you have  Whether you are pregnant or may be pregnant    WHAT ARE THE RISK?  Generally, this is a safe procedure. However,problems may occur, including  Headache  Bleeding  Infection  Allergic Reaction  Nerve Damage    WHAT CAN I EXPECT AFTER THE PROCEDURE?    INJECTION SITE  Remove the Band-Aid/s after 24 hours  Check your injection site every day for signs of infection.  Check for:             Redness             Bleeding (small amt is normal)             Warmth             Pus or bad odor  Some numbness may be experienced for several hours following the procedure.  Avoid using heat on the injection site for 24 hours. You may use ice intermittently if needed by placing a         towel between your skin and the ice bag and using the ice for 20 minutes 2-3 times a day.  Do not take baths, swim or use a hot tub for 24 hours.    ACTIVITY  No strenuous activity for 24 hours then return to normal activity as tolerated.  If your leg is numb, no driving until full sensation and strength has returned.    GENERAL INSTRUCTIONS:  The injection site may feel numb, use ice with caution if numbness is present and no heat for 24 hours or until numbness is gone.   If you have numbness or weakness in your arm or leg, use those areas with caution until normal sensation returns.  It is not uncommon to notice an increase in discomfort or a change in the location of discomfort for 3-4 days after the procedure.  If discomfort is noticed  "at the injection site, ice may be            applied to that area for 20 min 2-3 times a day.  Take the pain medicine your physician has prescribed or over the counter pain relievers as long as you do not have any contraindications.  If you are a diabetic, monitor your blood sugar closely.  The steroids used in your procedure may increase your blood sugar level up to 36 hours after the injection.  If your blood sugar is greater than 250, call the physician that helps you monitor your blood sugar.  Keep all follow-up visits as scheduled by your health care provider. This is important.    CONTACT OUR OFFICE IF:  You have any of these signs of infection            -Redness, swelling, or warmth around your injection site.            -Fluid or blood coming from your injection site (small amt of blood is normal)            -Pus or a bad odor from your injection site            -A fever  You develop a severe headache or a stiff neck  You lose control of your bladder or bowel movements      PAIN MANAGEMENT CENTER HOURS   Monday-Friday 7:30 am. - 4:00 pm.  For any problem related to your procedure we can be reached at [phone].  If you experience an emergency with your procedure, call [phone] or go to the emergency room.                              Guide To Relieving And Avoiding Neck Pain    Exercise is important to help prevent and treat neck pain.  Good posture, exercise and avoiding injury will help to keep your neck healthy.    When the neck is strained or over worked symptoms may include headache, upper back pain, shoulder pain or arm pain.  Numbness or tingling in the fingers, dizziness or nausea may also occur.    Posture:    Avoid slumping over a desk.  Raise your work (including computer) to eye level to avoid bending at the neck.      Change Position Often:  Changing position prevents overuse of particular muscles.    Sleep On One Pillow:  Using to many pillows or to large of a pillow causes a \"kink\" in " you neck.      Move and Exercise:  Living an active lifestyle is an important part of staying healthy.  Be sure to include the exercise to follow specifically for your neck.                    Range of Motion Exercises:  Do these exercises three times a day.  If you experience increased pain stop and contact your physician.  All exercises can be performed sitting or standing.        1.    2.   3.    1.  Place both hands behind you neck.  Gently tilt your neck backward so that you are looking at the ceiling.  Hold for a count of 10.    2.  Look straight facing forward.  Slowly tip your ear toward your right shoulder.  Do not force the motion.  Hold for a count of 10.  Bring head back to starting position and repeat to left side.    3.  Look straight facing forward.  Gently turn your head to the right.  Do not force the motion.  Hold for a count of 10.  Bring head back to starting position and repeat to the left side.    Exercises To Strengthen Muscles:  1.  Look straight facing forward.  Relax your shoulders.  Raise both shoulders toward your ears.  Hold for 3 seconds.       1.       2.   3.      1.  Look straight facing forward.  Relax your shoulders.  Raise both shoulders toward     2.  Raise your ars to your side and bend your elbows.  Squeeze shoulder blades together as you rotate your arms outward.  Hold for 5 seconds.    3.  Look straight facing forward.  Pull your head straight back.  Do not tip or move your jaw.  Hold for 5 seconds.

## 2023-05-12 NOTE — ANESTHESIA PROCEDURE NOTES
PAIN Epidural block      Patient reassessed immediately prior to procedure    Patient location during procedure: pain clinic  Indication:procedure for pain  Performed By  Anesthesiologist: Baldev Pate MD  Preanesthetic Checklist  Completed: patient identified and risks and benefits discussed  Additional Notes  Diagnosis:   Post-Op Diagnosis Codes:     * Cervical radiculopathy (M54.12)    Sedation:  none    Sedation time:    Under fluoroscopic guidance, the epidural space was identified and accessed, confirmed by loss of resistance to saline.  The above medications were injected uneventfully.    Prep:  Pt Position:prone  Sterile Tech:cap, gloves, mask and sterile barrier  Prep:chlorhexidine gluconate and isopropyl alcohol  Monitoring:blood pressure monitoring, continuous pulse oximetry and EKG  Procedure:Sedation: no     Approach:midline  Guidance: fluoroscopy  Location:cervical  Level:6-7  Needle Type:Tuohy  Needle Gauge:20  Aspiration:negative  Medications:  Comments:Decadron 10 mg preservative-free  Post Assessment:  Pt Tolerance:patient tolerated the procedure well with no apparent complications  Complications:no

## 2023-05-12 NOTE — H&P
HPI:  The patient returns for another Cervical epidural steroid injection today.  They have received at least 80% improvement since their last injection with a pain level of 5-8/10 at its worst recently.    Conservative measures tried in the interim rest Motrin physical therapy    Current Outpatient Medications on File Prior to Encounter   Medication Sig Dispense Refill   • famotidine (PEPCID) 40 MG tablet Take 1 tablet by mouth Every Morning.     • hydrOXYzine (ATARAX) 25 MG tablet Take 2-3 tablets at bedtime as needed for sleep 180 tablet 3   • ibuprofen (ADVIL,MOTRIN) 800 MG tablet Take 1 tablet by mouth Every 8 (Eight) Hours As Needed for Mild Pain. 180 tablet 3   • ketotifen (ZADITOR) 0.025 % ophthalmic solution Administer 1 drop to both eyes 2 (Two) Times a Day. 15 mL 3   • lisinopril-hydrochlorothiazide (PRINZIDE,ZESTORETIC) 20-25 MG per tablet Take 1 tablet by mouth Daily. 90 tablet 3   • MAGNESIUM PO Take  by mouth.     • metFORMIN ER (GLUCOPHAGE-XR) 750 MG 24 hr tablet Take 500 mg by mouth Daily.     • propranolol LA (INDERAL LA) 60 MG 24 hr capsule Take 1 capsule by mouth Daily.     • VITAMIN D PO Take  by mouth.       Current Facility-Administered Medications on File Prior to Encounter   Medication Dose Route Frequency Provider Last Rate Last Admin   • Chlorhexidine Gluconate Cloth 2 % pads 1 each  1 each Apply externally Take As Directed Hari Ventura MD           Past Medical History:   Diagnosis Date   • Allergic     sulfa, hydracodone, oxycodone   • Anxiety    • Breast cancer     left mastectomy   • Cancer     breast   • Carpal tunnel syndrome of right wrist    • Chest pain radiating to jaw     STRESS TEST [place] 2018, IN EPIC - D/T STRESS, PANIC    • GERD (gastroesophageal reflux disease)    • H/O Septicemia (CMS/[facility]) 2017    H/O Kidney stones   • Headache    • Heart palpitations    • History of breast cancer 2019    LEFT   • History of kidney stones 2017   • History of panic attacks    •  Hyperlipidemia    • Hypertension    • Injury of neck     Whiplash 25 years ago    • Neck pain        Negative screen for JUAN ANTONIO  Review of systems negative for hematologic, infectious or constitutional symptoms    Exam:  /75 (BP Location: Left arm, Patient Position: Sitting)   Pulse 64   Temp 37 °C (98.6 °F) (Temporal)   Resp 14   LMP  (LMP Unknown)   SpO2 99%   Airway Mallampatti 2  Alert and oriented    Diagnosis:    Post-Op Diagnosis Codes:     * Cervical radiculopathy [M54.12]    Plan:  Cervical 6 epidural steroid injection under fluoroscopic guidance    I have encouraged them to continue:  1.  Physical therapy exercises at home as prescribed by physical therapy or from the pain clinic handout.  Continuation of these exercises every day, or multiple times per week, even when the patient has good pain relief, was stressed to the patient as a preventative measure to decrease the frequency and severity of future pain episodes.  2.  Continue pain medicines as already prescribed.  If patient not currently taking any, it is recommended to begin Acetaminophen 1000 mg po q 8 hours.  If other medicines containing Acetaminophen are currently prescribed, maintain daily dose at 3000mg.    3.  If they can tolerate NSAIDS, it is recommended to take Ibuprofen 600 mg po q 6 hours for 7 days during pain exacerbations.  Alternatively, they may substitute an NSAID of their choice (e.g. Aleve)  4.  Heat and ice to the affected area as tolerated for pain control.  5.  Low impact exercise such as walking or water exercise was recommended to maintain overall health and aid in weight control.   6.  Follow up as needed for subsequent injections.

## 2023-12-19 RX ORDER — KETOTIFEN FUMARATE 0.35 MG/ML
1 SOLUTION/ DROPS OPHTHALMIC 2 TIMES DAILY
Qty: 5 ML | OUTPATIENT
Start: 2023-12-19

## 2024-03-13 ENCOUNTER — TELEPHONE (OUTPATIENT)
Dept: SURGERY | Facility: CLINIC | Age: 59
End: 2024-03-13
Payer: COMMERCIAL

## 2024-03-13 NOTE — TELEPHONE ENCOUNTER
Lvm for pt to let her know I saw she did not get her imaging done so we need ot get that anselmo before her appt with demetrius sauer in April

## 2024-03-14 NOTE — TELEPHONE ENCOUNTER
Patient copied message      Can you have any prescriptions I just [facility]- I could not change it in the system myself.  The Buspar, Lisinopril, and Ibuprofen are what I have asked for refills on.    
Adult

## 2024-03-28 DIAGNOSIS — Z12.31 ENCOUNTER FOR SCREENING MAMMOGRAM FOR MALIGNANT NEOPLASM OF BREAST: Primary | ICD-10-CM

## 2024-04-09 ENCOUNTER — HOSPITAL ENCOUNTER (OUTPATIENT)
Dept: MAMMOGRAPHY | Facility: HOSPITAL | Age: 59
Discharge: HOME | End: 2024-04-09
Admitting: NURSE PRACTITIONER
Payer: COMMERCIAL

## 2024-04-09 DIAGNOSIS — Z12.31 ENCOUNTER FOR SCREENING MAMMOGRAM FOR MALIGNANT NEOPLASM OF BREAST: ICD-10-CM

## 2024-04-09 PROCEDURE — 77063 BREAST TOMOSYNTHESIS BI: CPT

## 2024-04-09 PROCEDURE — 77067 SCR MAMMO BI INCL CAD: CPT

## 2024-04-15 NOTE — PROGRESS NOTES
[facility]     Referring Provider: No ref. provider found     Chief complaint: Routine followup DCIS     HPI:   2/4/19: Saw Dr. Cat   Ms. Mason Parada is a 52 yo woman, seen at the request of Dr. Julia Whitlock, for a new diagnosis of left breast DCIS. This was initially detected as an imaging abnormality. Her work-up is detailed in the oncologic history below. She denies any breast lumps, pain, skin changes, or nipple discharge. She denies any prior history of abnormal mammograms or breast biopsies. She has a family history of breast cancer in a paternal aunt, diagnosed in her late 60's. She has a family history of ovarian cancer in her sister, diagnosed at age 34.      3/21/19: Saw Dr. Cat   She underwent left mastectomy & SLNB with TE reconstruction on 3/5/19. See surgery & pathology details below in oncologic history. She has been recovering well and has no complaints. She has already seen Dr. Ding back and had one drain removed. She has seen Dr. Bailey and discussed chemoprevention. She will be starting tamoxifen in a few weeks.      6/20/19: Saw Dr. Cat   She returns today for scheduled follow-up. I have reviewed the interval records since her last visit. She underwent left implant exchange and right augmentation and mastopexy with Dr. Ding on 6/11/19. She has been recovering from this well. She is seeing Dr. Bailey and has continued on tamoxifen for chemoprevention. She is tolerating this reasonably well. She has occasional headaches and restless legs in the evening, which she is not sure whether or not is attributed to the tamoxifen or her other medications. She is considering switching the timing of her tamoxifen dose to the morning. She is still being followed by physical therapy. She denies any additional complaints.     12/2/19 Saw Dr. Cat   She returns today for scheduled follow-up. I have reviewed the interval records since her last visit. She remains on tamoxifen and  is still tolerating this fairly well. She has been discharged from physical therapy. She underwent right screening mammogram prior to her appointment which showed an asymmetry requiring additional evaluation. She denies any new complaints.    4/10/23  Patient presenting for routine follow-up.  She last saw oncology on February 2, 2023.  Pt states that she hasn't taken Tamoxifen since Oct. bc of leg cramps.  No new therapy has been suggested.  Has no new breast complaints or concerns. Due for right screening mammo in June.    4/16/2024 interval history  Patient presenting to the office today for routine follow-up.  Appears she has not seen her oncologist since February 2023 and she has been off tamoxifen for quite some time because of leg cramps and a new therapy has not been started.  It also appears that she did not get her screening mammogram in 2023 but she did get a screening mammogram on 4/9/2024 which resulted as BI-RADS 2.  She has no new breast complaints or concerns today.      Oncology/Hematology History Overview Note   2008, Screening MMG: Negative, per pt report.      Ductal carcinoma in situ (DCIS) of left breast   10/31/2018 Initial Diagnosis    Ductal carcinoma in situ (DCIS) of left breast     11/1/2018 Imaging    Screening MMG with Celso ([facility]): Heterogeneously dense.  1. There is a focal asymmetry and calcifications seen in the posterior one-third central region of the right breast.  2. There is a focal asymmetry with associated calcifications seen in the middle one-third central region of the right breast.  3. There are calcifications seen in the posterior one-third superior region of the left breast.  4. There are prominent axillary lymph nodes in the left axilla.  BI-RADS 0: Incomplete.     1/4/2019 Imaging    Bilateral Diagnostic MMG with Celso & Bilateral US ([facility]):   MMG: Heterogeneously dense.  1. On the present examination, focal asymmetry in the right breast, central does not persist. The  asymmetry noted on the recent screening mammogram resolves into stroma on additional views.  2. There are multiple amorphous and indistinct calcifications with segmental distribution in the middle one-third of the left breast at 12-1 o'clock, central located 7 cm from the nipple. These span approximately 2 cm. The asymmetry noted on the recent mammogram resolves into stroma on additional views.  3. There are several punctate and indistinct calcifications with linear distribution in the posterior one-third 1:30 o'clock region of the left breast located 8 cm from the nipple. These span approximately 1 cm.  4. There are a few small axillary lymph nodes in the left axilla.  US:  1. In the central, posterior one third region of the right breast, there is no evidence of any solid mass or abnormal cystic elements.  2. In the central, middle one-third of the left breast at 12-1 o'clock, there is no discrete solid or suspicious sonographic abnormalities.   4. Ultrasound is suggestive of a few fatty lymph nodes. The largest measures 19 x 8 x 12 mm. This demonstrates prominent cortex measuring up to 3 mm. The remaining smaller nodes demonstrate normal morphology and size.  5. There is an oval cluster of cyst with partially defined margins measuring 10 x 4 x 7 mm seen in the sub-areolar region of the left breast. Internal echotexture is heterogeneous.   6. There are a few small simple cyst seen in the left breast. These measure less than 1 cm.  IMPRESSION:  1. Area in the right breast is benign-negative.  2. Calcifications in the middle one third of the left breast at 12 to 1:00, centrally located 7 cm from the nipple are suspicious. Biopsy is recommended.  3. Calcifications in the posterior one third 1:00 region of the left breast located 8 cm from the nipple are suspicious. Biopsy is recommended.  4. Fatty lymph nodes in the left axilla are suspicious. Aspiration is recommended.  5. Cluster of cysts in the subareolar region  "of the left breast is suspicious. Cyst aspiration is recommended.  6. Simple cysts in the left breast are benign-negative.  BI-RADS 4B: Suspicious.      1/28/2019 Biopsy    Left breast, subareolar, Cyst Aspiration ([facility]):   Left subareolar breast tissue, ultrasound guided aspiration, thin prep:   No malignant cells identified. Cytologic features consistent with benign fibrocystic changes.  -1/4 cc of blood-tinged fluid was aspirated. The walls of the cyst collapsed. There was partial sonographic resolution. Concordant.    Left axillary lymph node FNA ([facility]):  -Procedure not performed, due to no suspicious lymph nodes on 1/28/19 exam.     1/28/2019 Biopsy    Left breast, Stereotactic Biopsy x 2 ([facility]):     1. Central Left breast Tissue, 12-1:00, \"tophat\" clip:  Intermediate Grade Cribriform Ductal Carcinoma in Situ with Central Necrosis, Microcalcification and Lobular Extension.   Largest focus of DCIS measuring 6 mm.   No invasive carcinoma identified.   Fibrofatty breast tissue also showing florid intraductal hyperplasia of the usual type, cystically dilated and ectatic ducts, patchy mild chronic stromal inflammation, and stromal microcalcifications.     ER+ (98.96%, strong)  NH+ 92.91%, strong)  Ki67 3.18%    2. Left Breast Tissue, 1:30, \"minicork\" clip:   Low to Intermediate Grade Cribriform and Solid Ductal Carcinoma in Situ with Central Necrosis, Microcalcifications and Lobular Extension.   Largest focus of DCIS measuring 4 mm.   No invasive carcinoma identified.   Fibrofatty breast tissue also showing columnar cell change, patchy mild chronic stromal inflammation and stromal microcalcifications.     ER+ (98.04%, strong)  NH+ (88.62%, strong)  Ki67 4.4%     1/31/2019 Imaging    Breast MRI ( [facility]):   Within the middle third of the left breast at the 12-o'clock position on the order of 5.5 cm from the nipple there is a metallic clip seen within a postbiopsy cavity that measures on the order of 1.4 cm in the " superior to inferior dimension, 3.2 cm in anterior to posterior dimension and 3.1 cm in medial to lateral dimension. A metallic clip is seen within the central portion of the cavity. The metallic clip represents the T-shaped metallic clip seen on the postbiopsy mammogram. This represents a biopsy-proven site of malignancy. Clumped enhancement that has an aggregate measures on the order of 1.8 cm in superior to inferior dimension, 1.4 cm in anterior to posterior dimension and 1.3 cm in the medial to lateral dimension, as seen along the lateral margin of the T-shaped metallic clip. The postbiopsy mammogram is somewhat difficult to evaluate due to postbiopsy change, but calcifications are seen within this region.  In the posterior one-third of the upper quadrant of the left breast centered at the 1-o'clock position on the order of 7.7 cm from the nipple there is a metallic clip which represents the barrel-shaped metallic clip. This metallic clip is located on the order of 1.3 cm superior to clumped enhancement that measures on the order of 1.1 cm in anterior to posterior dimension, 0.7 cm in the superior to inferior dimension and 0.7 cm in the medial to lateral dimension. This represents a biopsy-proven site of malignancy. Mammographically on the postbiopsy mammogram, there is a suggestion of some microcalcifications seen inferior to this barrel-shaped metallic clip that appear to correspond to the enhancement seen in this region. No other areas of abnormal enhancement or morphology are seen within the left breast. There is no evidence for left axillary adenopathy. There are no findings suspicious for malignancy in the right breast.  BI-RADS 6: Known biopsy-proven malignancy.      2/4/2019 Genetic Testing    [employer] Common Hereditary Cancers & Breast Cancer STAT Panels (47 genes):  Negative     3/5/2019 Surgery    Left skin-sparing mastectomy with SLNB & prepectoral TE reconstruction     1. Lymph Node, [place] Node #1  (two levels):  Single benign lymph node.     2. Lymph Node, Upper Tract Node  #2, Excision (two levels):  Single benign lymph node.     3. Breast, Simple Mastectomy, Skin Sparing: Multifocal ductal carcinoma in-situ.               A. The ductal carcinoma in-situ is of intermediate nuclear grade.               B. The ductal carcinoma in-situ has comedo, micropapillary, solid and cribriform architecture.               C. The ductal carcinoma in-situ has comedo type expansive necrosis.               D. The ductal carcinoma in-situ is multifocal with the largest confluent area measuring 45 mm.                    Additional separate foci include two foci up to 20 mm , and one up to 23 mm (ductal                   carcinoma is in fourteen of thirty-one blocks (14/35).               F. The ductal carcinoma in-situ comes within 20 mm of the superior margin, 100 mm of the inferior margin, 40                    mm of the medial margin, 50 mm of the lateral margin, 18 mm of the anterior margin and 15 mm of the posterior margin (closest margin posterior 15 mm away).               F. Hormone receptors were performed on prior tissue and were ER and MT positive.     4/1/2019 -  Hormonal Therapy    Tamoxifen 20 mg daily as chemoprevention     6/11/2019 Surgery    1.  Left breast removal of tissue expander with placement of permanent breast prosthesis, Allergan [id number] cohesive silicone implant  2.  Autologous fat grafting 45 cc to the left breast  3.  Augmentation and mastopexy to the right breast for symmetry, Allergan  cc silicone implant     11/25/2019 Imaging    Right Screening MMG with Celso ([facility]):  Heterogeneously dense. Patient has had interval implant placement. There is an asymmetry measuring 4 mm seen in the MLO view only seen in the posterior one third central region of the right breast at the edge of the film.  BI-RADS 0: Incomplete.     12/9/2019 Imaging    Right Diagnostic MMG with Celso & Right Breast US  ([facility]):  MMG:  On the present examination, asymmetry in the right breast, central does not persist. With current diagnostic images, no worrisome mammographic abnormality is seen. The edge of the implant is seen.  US:  There are no suspicious masses, areas of focal shadowing or distortion seen.  BI-RADS 2: Benign.     6/16/2022 Imaging    SCREENING RIGHT-SIDED MAMMOGRAM WITH R2 COMPUTERIZED ASSISTED DETECTION  AND DIGITAL TOMOSYNTHESIS     HISTORY: Screening. Previous left mastectomy for breast cancer.     FINDINGS: Standard images and R2 COMPUTERIZED ASSISTED DETECTION were  obtained followed by digital Tomosynthesis. Pinch back views were also  performed to evaluate subpectoral breast implant which appears intact  and unchanged from 11/25/2019. There is scattered fibroglandular density  in the right breast. There are no findings to suggest malignancy.     CONCLUSION: Benign right-sided mammogram with implant in place and  showing no change from 11/25/2019. BI-RADS 2. Benign finding.         Review of Systems:  See interval history.       Medications:    Current Outpatient Medications:     famotidine (PEPCID) 40 MG tablet, Take 40 mg by mouth Every Morning., Disp: , Rfl:     hydrOXYzine (ATARAX) 25 MG tablet, Take 2-3 tablets at bedtime as needed for sleep, Disp: 180 tablet, Rfl: 3    ibuprofen (ADVIL,MOTRIN) 800 MG tablet, Take 1 tablet by mouth Every 8 (Eight) Hours As Needed for Mild Pain., Disp: 180 tablet, Rfl: 3    ketotifen (ZADITOR) 0.025 % ophthalmic solution, Administer 1 drop to both eyes 2 (Two) Times a Day., Disp: 15 mL, Rfl: 3    lisinopril-hydrochlorothiazide (PRINZIDE,ZESTORETIC) 20-25 MG per tablet, Take 1 tablet by mouth Daily., Disp: 90 tablet, Rfl: 3    MAGNESIUM PO, Take  by mouth., Disp: , Rfl:     propranolol (INDERAL) 20 MG tablet, Take 1 tablet by mouth 3 (Three) Times a Day As Needed (anxiety)., Disp: 270 tablet, Rfl: 3    VITAMIN D PO, Take  by mouth., Disp: , Rfl:   No current  facility-administered medications for this visit.    Facility-Administered Medications Ordered in Other Visits:     Chlorhexidine Gluconate Cloth 2 % pads 1 each, 1 each, Apply externally, Take As Directed, Hari Ventura MD      Allergies   Allergen Reactions    Hydrocodone Itching    Sulfa Antibiotics Itching     LIP swelling       Family History   Problem Relation Age of Onset    Hypertension Mother     Other Mother         Blood clots    Emphysema Father     Lung cancer Father 62    Bone cancer Father 62    Lupus Sister     Cancer Sister 28        Cervical    Other Brother         kidney stone    Heart disease Maternal Grandfather 55    Diabetes Maternal Grandfather     Hypertension Maternal Grandfather     Pancreatic cancer Paternal Grandmother 68    Other Paternal Grandfather         kidney stone    Stroke Maternal Grandmother     Breast cancer Paternal Aunt         late 60's    Ovarian cancer Sister 34    Cancer Other     Malig Hyperthermia Neg Hx        PHYSICAL EXAMINATION:   There were no vitals filed for this visit.  ECOG 0 - Asymptomatic  General: NAD, well appearing  Psych: a&o x 3, normal mood and affect  Eyes: EOMI, no scleral icterus  ENMT: neck supple without masses or thyromegaly, mucus membranes moist  MSK: normal gait, normal ROM in bilateral shoulders  Lymph nodes: no cervical, supraclavicular or axillary lymphadenopathy  Breast:   Right: Well-healed mastopexy incision with implant in place. Scars are soft. No masses.  Left: Sp mastectomy with implant in place. Soft, well-healed IMF incision and soft, well-healed central mastectomy scar. No masses.      Assessment:  57 y.o. F with a diagnosis of multifocal left breast DCIS: intermediate grade, ER/IA+. She is sp left [facility] & SLNB on 3/5/29, pTis (largest focus 4.5 cm). She is on tamoxifen for chemoprevention. She is sp left implant exchange and right augmentation and mastopexy with Dr. Ding on 6/11/19.  -She has a right breast asymmetry on  recent screening MMG.    Plan:  -She is 5 years out from her cancer diagnosis with a normal mammogram and no new complaints.  I am not giving her a follow-up appointment in the office but if she has any new breast issues I would be happy to see her back.  She is due for her mammogram in April 2025 which can be ordered by her PCP  -Continue f/u with Dr. Ding.  -She was instructed to call with any questions, concerns or changes on BSE.    LANE Mack      CC:  No ref. provider found  LANE De Dios APRN

## 2024-04-16 ENCOUNTER — OFFICE VISIT (OUTPATIENT)
Dept: SURGERY | Facility: CLINIC | Age: 59
End: 2024-04-16
Payer: COMMERCIAL

## 2024-04-16 VITALS
HEIGHT: 59 IN | OXYGEN SATURATION: 99 % | HEART RATE: 80 BPM | BODY MASS INDEX: 31.41 KG/M2 | WEIGHT: 155.8 LBS | DIASTOLIC BLOOD PRESSURE: 82 MMHG | SYSTOLIC BLOOD PRESSURE: 132 MMHG

## 2024-04-16 DIAGNOSIS — D05.12 DUCTAL CARCINOMA IN SITU (DCIS) OF LEFT BREAST: Primary | ICD-10-CM

## 2024-04-16 PROCEDURE — 99213 OFFICE O/P EST LOW 20 MIN: CPT | Performed by: NURSE PRACTITIONER

## (undated) DEVICE — BNDR ABD PREMIUM/UNIV 10IN 27TO48IN

## (undated) DEVICE — 1 ML TUBERCULIN SYRINGE REGULAR TIP: Brand: MONOJECT

## (undated) DEVICE — DRSNG SURESITE WNDW 4X4.5

## (undated) DEVICE — GLV SURG SENSICARE MICRO PF LF 6.5 STRL

## (undated) DEVICE — SYS FAT GRAFTING REVOLVE SGL

## (undated) DEVICE — 3M™ IOBAN™ 2 ANTIMICROBIAL INCISE DRAPE 6650EZ: Brand: IOBAN™ 2

## (undated) DEVICE — ANTIBACTERIAL UNDYED BRAIDED (POLYGLACTIN 910), SYNTHETIC ABSORBABLE SUTURE: Brand: COATED VICRYL

## (undated) DEVICE — STPLR SKIN VISISTAT WD 35CT

## (undated) DEVICE — SPNG GZ WOVN 4X4IN 12PLY 10/BX STRL

## (undated) DEVICE — HANDPIECE SET WITH COAXIAL HIGH FLOW TIP AND SUCTION TUBE: Brand: INTERPULSE

## (undated) DEVICE — PK UNIV PLSTC 40

## (undated) DEVICE — SUT MNCRYL 3/0 PS2 18IN Y497G

## (undated) DEVICE — CAP CONN RED

## (undated) DEVICE — ELECTRD BLD EXT EDGE 1P COAT 6.5IN STRL

## (undated) DEVICE — SUT GUT PLAIN 4/0 PS2 18IN ET1627H

## (undated) DEVICE — ADHS SKIN DERMABOND TOP ADVANCED

## (undated) DEVICE — CVR TRANSD CIV FLX TPR 11.9 TO 3.8X61CM

## (undated) DEVICE — SYS RETR EIKON ILLUM WAVEGUIDE WD/FLT

## (undated) DEVICE — BNDG ELAS ELITE V/CLOSE 6IN 5YD LF STRL

## (undated) DEVICE — TBG INFILTRATION CB 156IN

## (undated) DEVICE — APPL CHLORAPREP W/TINT 26ML ORNG

## (undated) DEVICE — APPL CHLORAPREP HI/LITE 26ML ORNG

## (undated) DEVICE — STCKNT IMPERV 9X36IN STRL

## (undated) DEVICE — CVR PROB GEN PURP W ISOSILK 6X48

## (undated) DEVICE — SUT MNCRYL 3/0 PS2 18IN MCP497G

## (undated) DEVICE — GLV SURG BIOGEL LTX PF 8 1/2

## (undated) DEVICE — LINER CANSTR SXN QUICKFIT 3000CC

## (undated) DEVICE — POOLE SUCTION HANDLE: Brand: CARDINAL HEALTH

## (undated) DEVICE — DUAL CUT SAGITTAL BLADE

## (undated) DEVICE — DRSNG TELFA PAD NONADH STR 1S 3X8IN

## (undated) DEVICE — NDL SPINE 22G 31/2IN BLK

## (undated) DEVICE — GOWN,NON-REINFORCED,SIRUS,SET IN SLV,XL: Brand: MEDLINE

## (undated) DEVICE — SYR LUERLOK 30CC

## (undated) DEVICE — INTENDED FOR TISSUE SEPARATION, AND OTHER PROCEDURES THAT REQUIRE A SHARP SURGICAL BLADE TO PUNCTURE OR CUT.: Brand: BARD-PARKER ® CARBON RIB-BACK BLADES

## (undated) DEVICE — SUT SILK 2/0 TIES 18IN A185H

## (undated) DEVICE — SET PRIM GRVTY NDLESS 2PRT MALE LL 104IN

## (undated) DEVICE — SOL ISO/ALC RUB 70PCT 4OZ

## (undated) DEVICE — ARM SLING: Brand: DEROYAL

## (undated) DEVICE — PK SHLDR OPN 40

## (undated) DEVICE — STPCK 3WY D201 DISCOFIX

## (undated) DEVICE — SUT ETHLN 3/0 PS1 18IN 1663H

## (undated) DEVICE — PENCL E/S ULTRAVAC TELESCP NOSE HOLSTR 10FT

## (undated) DEVICE — NDL SPINE 18G 31/2IN PNK

## (undated) DEVICE — Device

## (undated) DEVICE — ELECTRD BLD EDGE/INSUL1P 2.4X5.1MM STRL

## (undated) DEVICE — LEGGINGS, PAIR, 29X43, STERILE: Brand: MEDLINE

## (undated) DEVICE — TRAP FLD MINIVAC MEGADYNE 100ML

## (undated) DEVICE — SYR LUERLOK 50ML

## (undated) DEVICE — GLV SURG SIGNATURE ESSENTIAL PF LTX SZ8.5

## (undated) DEVICE — IRRIGATOR BULB ASEPTO 60CC STRL

## (undated) DEVICE — PREP SOL POVIDONE/IODINE BT 4OZ

## (undated) DEVICE — NDL HYPO PRECISIONGLIDE REG 25G 1 1/2

## (undated) DEVICE — SUT PDS 2/0 CT1 27IN VIL PDP339H

## (undated) DEVICE — SUT MNCRYL 4/0 PS2 18 IN

## (undated) DEVICE — JACKSON-PRATT 100CC BULB RESERVOIR: Brand: CARDINAL HEALTH

## (undated) DEVICE — BANDAGE,GAUZE,BULKEE II,4.5"X4.1YD,STRL: Brand: MEDLINE

## (undated) DEVICE — DEV DEL BRST/IMPL GEL KELLERFUNNEL

## (undated) DEVICE — IRRIGATOR TOOMEY 70CC

## (undated) DEVICE — IMPLANTABLE DEVICE
Type: IMPLANTABLE DEVICE | Site: SHOULDER | Status: NON-FUNCTIONAL
Brand: COMPREHENSIVE® REVERSE SHOULDER
Removed: 2021-10-14

## (undated) DEVICE — SOL NACL 0.9PCT 100ML SGL

## (undated) DEVICE — PK UNIV COMPL 40

## (undated) DEVICE — ELECTRD BLD EXT EDGE/INSUL 6IN

## (undated) DEVICE — EXTENSION SET, MALE LUER LOCK ADAPTER WITH RETRACTABLE COLLAR

## (undated) DEVICE — SUT VIC 3/0 TIES 18IN J110T

## (undated) DEVICE — SKIN PREP TRAY W/CHG: Brand: MEDLINE INDUSTRIES, INC.

## (undated) DEVICE — 3M™ IOBAN™ 2 ANTIMICROBIAL INCISE DRAPE 6640EZ: Brand: IOBAN™ 2

## (undated) DEVICE — TOWEL,OR,DSP,ST,BLUE,STD,4/PK,20PK/CS: Brand: MEDLINE

## (undated) DEVICE — DRAPE,U/ SHT,SPLIT,PLAS,STERIL: Brand: MEDLINE

## (undated) DEVICE — TBG SXN LIPO 3/8IDX5/8IN ODX10FT DISP

## (undated) DEVICE — SUT VIC 2/0 SH 27IN

## (undated) DEVICE — SUT VIC 2/0 CT2 27IN J269H

## (undated) DEVICE — MAT FLR ABSORBENT LG 4FT 10 2.5FT

## (undated) DEVICE — GLV SURG SENSICARE POLYISPRN W/ALOE PF LF 6.5 GRN STRL

## (undated) DEVICE — SUT MNCRYL PLS ANTIB UD 4/0 PS2 18IN